# Patient Record
Sex: MALE | Race: WHITE
[De-identification: names, ages, dates, MRNs, and addresses within clinical notes are randomized per-mention and may not be internally consistent; named-entity substitution may affect disease eponyms.]

---

## 2019-10-09 ENCOUNTER — HOSPITAL ENCOUNTER (INPATIENT)
Dept: HOSPITAL 72 - EMR | Age: 84
LOS: 9 days | Discharge: NURSING, REHAB OR LONG-TERM CARE | DRG: 871 | End: 2019-10-18
Payer: MEDICARE

## 2019-10-09 VITALS — DIASTOLIC BLOOD PRESSURE: 54 MMHG | SYSTOLIC BLOOD PRESSURE: 98 MMHG

## 2019-10-09 VITALS — SYSTOLIC BLOOD PRESSURE: 91 MMHG | DIASTOLIC BLOOD PRESSURE: 49 MMHG

## 2019-10-09 VITALS — SYSTOLIC BLOOD PRESSURE: 86 MMHG | DIASTOLIC BLOOD PRESSURE: 64 MMHG

## 2019-10-09 VITALS — DIASTOLIC BLOOD PRESSURE: 56 MMHG | SYSTOLIC BLOOD PRESSURE: 99 MMHG

## 2019-10-09 VITALS — SYSTOLIC BLOOD PRESSURE: 99 MMHG | DIASTOLIC BLOOD PRESSURE: 47 MMHG

## 2019-10-09 VITALS — HEIGHT: 62 IN | WEIGHT: 112 LBS | BODY MASS INDEX: 20.61 KG/M2

## 2019-10-09 VITALS — DIASTOLIC BLOOD PRESSURE: 53 MMHG | SYSTOLIC BLOOD PRESSURE: 102 MMHG

## 2019-10-09 DIAGNOSIS — R65.20: ICD-10-CM

## 2019-10-09 DIAGNOSIS — Z95.1: ICD-10-CM

## 2019-10-09 DIAGNOSIS — F33.0: ICD-10-CM

## 2019-10-09 DIAGNOSIS — D52.9: ICD-10-CM

## 2019-10-09 DIAGNOSIS — E78.5: ICD-10-CM

## 2019-10-09 DIAGNOSIS — I11.0: ICD-10-CM

## 2019-10-09 DIAGNOSIS — I48.91: ICD-10-CM

## 2019-10-09 DIAGNOSIS — Z66: ICD-10-CM

## 2019-10-09 DIAGNOSIS — Z79.01: ICD-10-CM

## 2019-10-09 DIAGNOSIS — N39.0: ICD-10-CM

## 2019-10-09 DIAGNOSIS — F20.9: ICD-10-CM

## 2019-10-09 DIAGNOSIS — J18.9: ICD-10-CM

## 2019-10-09 DIAGNOSIS — A41.9: Primary | ICD-10-CM

## 2019-10-09 DIAGNOSIS — I50.22: ICD-10-CM

## 2019-10-09 DIAGNOSIS — I25.5: ICD-10-CM

## 2019-10-09 DIAGNOSIS — I25.10: ICD-10-CM

## 2019-10-09 DIAGNOSIS — N40.0: ICD-10-CM

## 2019-10-09 DIAGNOSIS — R19.7: ICD-10-CM

## 2019-10-09 DIAGNOSIS — Z95.810: ICD-10-CM

## 2019-10-09 DIAGNOSIS — I25.2: ICD-10-CM

## 2019-10-09 DIAGNOSIS — E46: ICD-10-CM

## 2019-10-09 LAB
ADD MANUAL DIFF: YES
ALBUMIN SERPL-MCNC: 2.1 G/DL (ref 3.4–5)
ALBUMIN/GLOB SERPL: 0.5 {RATIO} (ref 1–2.7)
ALP SERPL-CCNC: 98 U/L (ref 46–116)
ALT SERPL-CCNC: 59 U/L (ref 12–78)
ANION GAP SERPL CALC-SCNC: 7 MMOL/L (ref 5–15)
APPEARANCE UR: (no result)
APTT PPP: (no result) S
AST SERPL-CCNC: 25 U/L (ref 15–37)
BILIRUB SERPL-MCNC: 0.8 MG/DL (ref 0.2–1)
BUN SERPL-MCNC: 33 MG/DL (ref 7–18)
CALCIUM SERPL-MCNC: 9.1 MG/DL (ref 8.5–10.1)
CHLORIDE SERPL-SCNC: 104 MMOL/L (ref 98–107)
CK MB SERPL-MCNC: 1.1 NG/ML (ref 0–3.6)
CK SERPL-CCNC: 207 U/L (ref 26–308)
CO2 SERPL-SCNC: 29 MMOL/L (ref 21–32)
CREAT SERPL-MCNC: 1.3 MG/DL (ref 0.55–1.3)
ERYTHROCYTE [DISTWIDTH] IN BLOOD BY AUTOMATED COUNT: 12.2 % (ref 11.6–14.8)
GLOBULIN SER-MCNC: 4.2 G/DL
GLUCOSE UR STRIP-MCNC: NEGATIVE MG/DL
HCT VFR BLD CALC: 35.7 % (ref 42–52)
HGB BLD-MCNC: 11.9 G/DL (ref 14.2–18)
KETONES UR QL STRIP: (no result)
LEUKOCYTE ESTERASE UR QL STRIP: (no result)
MCV RBC AUTO: 99 FL (ref 80–99)
NITRITE UR QL STRIP: NEGATIVE
PH UR STRIP: 5 [PH] (ref 4.5–8)
PLATELET # BLD: 245 K/UL (ref 150–450)
POTASSIUM SERPL-SCNC: 3.8 MMOL/L (ref 3.5–5.1)
PROT UR QL STRIP: (no result)
RBC # BLD AUTO: 3.61 M/UL (ref 4.7–6.1)
SODIUM SERPL-SCNC: 140 MMOL/L (ref 136–145)
SP GR UR STRIP: 1.02 (ref 1–1.03)
UROBILINOGEN UR-MCNC: 1 MG/DL (ref 0–1)
WBC # BLD AUTO: 27.7 K/UL (ref 4.8–10.8)

## 2019-10-09 PROCEDURE — 80069 RENAL FUNCTION PANEL: CPT

## 2019-10-09 PROCEDURE — 94664 DEMO&/EVAL PT USE INHALER: CPT

## 2019-10-09 PROCEDURE — 82550 ASSAY OF CK (CPK): CPT

## 2019-10-09 PROCEDURE — 81003 URINALYSIS AUTO W/O SCOPE: CPT

## 2019-10-09 PROCEDURE — 71045 X-RAY EXAM CHEST 1 VIEW: CPT

## 2019-10-09 PROCEDURE — 87081 CULTURE SCREEN ONLY: CPT

## 2019-10-09 PROCEDURE — 82607 VITAMIN B-12: CPT

## 2019-10-09 PROCEDURE — 93306 TTE W/DOPPLER COMPLETE: CPT

## 2019-10-09 PROCEDURE — 93970 EXTREMITY STUDY: CPT

## 2019-10-09 PROCEDURE — 87205 SMEAR GRAM STAIN: CPT

## 2019-10-09 PROCEDURE — 82378 CARCINOEMBRYONIC ANTIGEN: CPT

## 2019-10-09 PROCEDURE — 82553 CREATINE MB FRACTION: CPT

## 2019-10-09 PROCEDURE — 80162 ASSAY OF DIGOXIN TOTAL: CPT

## 2019-10-09 PROCEDURE — 85007 BL SMEAR W/DIFF WBC COUNT: CPT

## 2019-10-09 PROCEDURE — 87070 CULTURE OTHR SPECIMN AEROBIC: CPT

## 2019-10-09 PROCEDURE — 80202 ASSAY OF VANCOMYCIN: CPT

## 2019-10-09 PROCEDURE — 71250 CT THORAX DX C-: CPT

## 2019-10-09 PROCEDURE — 82270 OCCULT BLOOD FECES: CPT

## 2019-10-09 PROCEDURE — 80053 COMPREHEN METABOLIC PANEL: CPT

## 2019-10-09 PROCEDURE — 82962 GLUCOSE BLOOD TEST: CPT

## 2019-10-09 PROCEDURE — 86710 INFLUENZA VIRUS ANTIBODY: CPT

## 2019-10-09 PROCEDURE — 81001 URINALYSIS AUTO W/SCOPE: CPT

## 2019-10-09 PROCEDURE — 36415 COLL VENOUS BLD VENIPUNCTURE: CPT

## 2019-10-09 PROCEDURE — 83690 ASSAY OF LIPASE: CPT

## 2019-10-09 PROCEDURE — 85025 COMPLETE CBC W/AUTO DIFF WBC: CPT

## 2019-10-09 PROCEDURE — 93005 ELECTROCARDIOGRAM TRACING: CPT

## 2019-10-09 PROCEDURE — 84484 ASSAY OF TROPONIN QUANT: CPT

## 2019-10-09 PROCEDURE — 87040 BLOOD CULTURE FOR BACTERIA: CPT

## 2019-10-09 PROCEDURE — 80048 BASIC METABOLIC PNL TOTAL CA: CPT

## 2019-10-09 PROCEDURE — 82164 ANGIOTENSIN I ENZYME TEST: CPT

## 2019-10-09 PROCEDURE — 87086 URINE CULTURE/COLONY COUNT: CPT

## 2019-10-09 PROCEDURE — 83605 ASSAY OF LACTIC ACID: CPT

## 2019-10-09 PROCEDURE — 83550 IRON BINDING TEST: CPT

## 2019-10-09 PROCEDURE — 99291 CRITICAL CARE FIRST HOUR: CPT

## 2019-10-09 PROCEDURE — 96365 THER/PROPH/DIAG IV INF INIT: CPT

## 2019-10-09 PROCEDURE — 83540 ASSAY OF IRON: CPT

## 2019-10-09 PROCEDURE — 87045 FECES CULTURE AEROBIC BACT: CPT

## 2019-10-09 PROCEDURE — 82746 ASSAY OF FOLIC ACID SERUM: CPT

## 2019-10-09 PROCEDURE — 96361 HYDRATE IV INFUSION ADD-ON: CPT

## 2019-10-09 PROCEDURE — 83880 ASSAY OF NATRIURETIC PEPTIDE: CPT

## 2019-10-09 PROCEDURE — 96368 THER/DIAG CONCURRENT INF: CPT

## 2019-10-09 PROCEDURE — 87324 CLOSTRIDIUM AG IA: CPT

## 2019-10-09 PROCEDURE — 94640 AIRWAY INHALATION TREATMENT: CPT

## 2019-10-09 RX ADMIN — TAMSULOSIN HYDROCHLORIDE SCH MG: 0.4 CAPSULE ORAL at 20:29

## 2019-10-09 RX ADMIN — TRAZODONE HYDROCHLORIDE SCH MG: 50 TABLET ORAL at 20:29

## 2019-10-09 NOTE — CONSULTATION
DATE OF CONSULTATION:  10/09/2019

CARDIOLOGY CONSULTATION



This is a coverage for Dr. Ventura.



REQUESTING PHYSICIAN:  Lucy Carroll M.D.



REASON FOR CONSULT:  Heart failure and implantable defibrillator.



REASON FOR CONSULT:  Congestive heart failure.



HISTORY OF PRESENT ILLNESS:  Limited history is available from the patient.

The patient is an 83-year-old  man, skilled nursing

facility resident, who was brought in by paramedics with increasing cough

and dyspnea.  His white blood count was 27,000 on admission and chest

x-ray showed a left upper lobe infiltrate consistent with pneumonia.  He

has been admitted for further treatment.  With regard to his cardiac

status, he has a history of coronary artery disease with remote history of

coronary artery bypass graft surgery (details not currently available),

ischemic cardiomyopathy, and status post biventricular ICD placement with

several generator replacements.  He states that the current generator is a

[employer] device.  He had an echo today, which showed an ejection fraction

of 25 to 30 percent, global left ventricular hypokinesis, and apical

akinesis.



MEDICATIONS (OUTPATIENT):  Atorvastatin 40 mg at bedtime, Coreg 25 mg

b.i.d., digoxin 0.125 mg daily, Aricept 5 mg daily, Avodart 0.5 mg daily,

Lexapro 10 mg daily, Lasix 20 mg daily, lisinopril 20 mg daily, Xarelto 10

mg daily daily, Daliresp 500 mcg p.o. daily, Flomax 0.4 mg at bedtime,

Spiriva inhaler 18 mcg 1 puff daily, and trazodone 150 mg at bedtime.



ALLERGIES:  No known drug allergies.



PAST MEDICAL HISTORY:  As noted above.  Also, history of BPH,

hyperlipidemia, permanent atrial fibrillation, chronic obstructive

pulmonary disease, and schizophrenia.



SOCIAL HISTORY:  The patient reports smoking 3 to 4 cigarettes per day.

Remote history of alcohol use, none currently.  No history of drug use.



PHYSICAL EXAMINATION:

VITAL SIGNS:  Blood pressure 91/49, previously 74/42, improved following

intravenous fluids, pulse 60 regular (paced), respirations 18, and

afebrile.

GENERAL:  A thin elderly appearing  male, in no acute

distress.

HEENT:  Normocephalic and atraumatic.  Bitemporal wasting.  Pupils are

equal, round, and reactive to light.  Sclerae anicteric.  Oral mucosa are

moist.

NECK:  Supple.  There is no jugular venous distention.

LUNGS:  Left rales.

HEART:  Regular S1, S2.  No murmur or S3.

ABDOMEN:  Soft and nontender.  No palpable mass.

EXTREMITIES:  No cyanosis, clubbing, or edema.



LABORATORY DATA:  White blood count 27,700, hemoglobin 11.9, and platelets

245,000.  Sodium 140, potassium 3.8, chloride 104, bicarb 29, BUN 33,

creatinine 1.3, and glucose 131.  Troponin 0.067.  BNP 8769.  EKG is

pending.  Telemetry shows atrial fibrillation with a ventricularly paced

rhythm at 60 beats per minute.  Chest x-ray shows cardiomegaly,

biventricular ICD with leads in the right atrium, right ventricle, and

coronary sinus branch, and a large left upper lung infiltrate.



PLAN AND ASSESSMENT:  The patient is an 83-year-old man with multiple

chronic medical issues as outlined above, who was admitted with a left

upper lobe pneumonia.  He is being evaluated by Infectious Disease and has

been started on antibiotics including vancomycin and cefepime and

azithromycin.  He has also been started on respiratory treatments

including albuterol ipratropium.  With regard to his cardiac status, he

does not currently appear to be in congestive heart failure.  He is in

atrial fibrillation with controlled ventricular rate, currently

ventricular pacing at 60 beats per minute.  I would favor continuing

Xarelto for stroke prevention.  His blood pressure at this point is too

low to resume his Coreg or ACE inhibitor.  I will plan to restart these

medications once blood pressure has improved with resolution of his

pneumonia and sepsis.  We will arrange for ICD interrogation and would

consider increasing the pacing rate in view of his significantly decreased

left ventricular systolic function.  Dr. Ventura will continue to follow

the patient starting 10/10/2019.



Thank you for involving us in his care.









  ______________________________________________

  Ellen Haynes M.D. DR:  SHERYL

D:  10/09/2019 22:11

T:  10/09/2019 22:48

JOB#:  [id number]

CC:

## 2019-10-09 NOTE — NUR
NURSE NOTES:

Received report from MANUEL Gordon RN. Patient in bed with no complaints of acute pain or 
discomfort. Kept clean, dry, and comfortable in bed. On bedrest for weakness and safety. IV 
line intact and patent SL. External catheter in place. On 2L NC with no S/S respiratory 
distress or SOB noted. Safety precaution in place; siderails X3 up, call light within reach, 
bed in lowest position, brakes and alarm on at all times. Needs and wants anticipated and 
attended. Will continue plan of care and monitor for any changes noted.

## 2019-10-09 NOTE — NUR
ED Nurse Note:



patient transferred to 2E with all of his belongings on ACLS protocol without 
complication.

## 2019-10-09 NOTE — CONSULTATION
History of Present Illness


General


Date patient seen:  Oct 9, 2019


Chief Complaint:  Abnormal Labs





Present Illness


HPI


84 y/o M with hx of Afib on AC, HTN, HLD, CAD/MI s/p CABG, s/p AICD, tobacco 

abuse, cardiomyopathy EF 25-30%, CHF, anemia, BPH, schizophrenia, [facility] resident 

presented to ED on 10/9 with congestion, productive cough and elevated WBC. 

Hypotensive in ED, responded to fluids.


Allergies:  


Coded Allergies:  


     No Known Allergies (Unverified , 10/9/19)





Medication History


Scheduled


Atorvastatin Calcium* (Atorvastatin Calcium*), 40 MG ORAL BEDTIME, (Reported)


Carvedilol* (Carvedilol*), 25 MG ORAL EVERY 12 HOURS, (Reported)


Digoxin* (Digoxin*), 0.125 MG ORAL DAILY, (Reported)


Donepezil Hcl* (Donepezil Hcl*), 5 MG ORAL QHS, (Reported)


Dutasteride (Avodart), 0.5 MG ORAL DAILY, (Reported)


Escitalopram Oxalate* (Lexapro*), 10 MG ORAL DAILY, (Reported)


Furosemide* (Lasix*), 20 MG ORAL DAILY, (Reported)


Lisinopril (Lisinopril*), 20 MG ORAL DAILY, (Reported)


Rivaroxaban (Xarelto*), 20 MG ORAL DAILY, (Reported)


Roflumilast (Daliresp), 500 MCG PO DAILY, (Reported)


Tamsulosin HCl (Flomax), 0.4 MG ORAL QHS, (Reported)


Tiotropium Bromide* (Spiriva*), 1 PUFF INH DAILY, (Reported)


Trazodone* (Trazodone*), 25 MG ORAL BEDTIME, (Reported)





Patient History


Healthcare decision maker


FELTON ROY


Resuscitation status





Advanced Directive on File





Patient History Narrative


Pmhx: as above





Shx:  The patient reports smoking 3 to 4 cigarettes per day.


Remote history of alcohol use, none currently.  No history of drug use.





Fhx: non contributory





Physical Exam





Last 24 Hour Vital Signs








  Date Time  Temp Pulse Resp B/P (MAP) Pulse Ox O2 Delivery O2 Flow Rate FiO2


 


10/9/19 13:15 98.0 76 21 99/47 97 Nasal Cannula 2.0 


 


10/9/19 13:15 98.0 76 21 99/47 97 Nasal Cannula 2.0 


 


10/9/19 13:07 98.0 46 15 91/49 93 Nasal Cannula 2.0 


 


10/9/19 11:19 98.0 78 21 86/64 94 Nasal Cannula 2.0 


 


10/9/19 10:27 95.5 89 16 74/42 (53) 91 Nasal Cannula 2.0 











Laboratory Tests








Test


  10/9/19


10:50 10/9/19


11:00 10/9/19


11:53


 


White Blood Count


  27.7 K/UL


(4.8-10.8)  *H 


  


 


 


Red Blood Count


  3.61 M/UL


(4.70-6.10)  L 


  


 


 


Hemoglobin


  11.9 G/DL


(14.2-18.0)  L 


  


 


 


Hematocrit


  35.7 %


(42.0-52.0)  L 


  


 


 


Mean Corpuscular Volume 99 FL (80-99)    


 


Mean Corpuscular Hemoglobin


  32.9 PG


(27.0-31.0)  H 


  


 


 


Mean Corpuscular Hemoglobin


Concent 33.2 G/DL


(32.0-36.0) 


  


 


 


Red Cell Distribution Width


  12.2 %


(11.6-14.8) 


  


 


 


Platelet Count


  245 K/UL


(150-450) 


  


 


 


Mean Platelet Volume


  6.7 FL


(6.5-10.1) 


  


 


 


Neutrophils (%) (Auto)


  % (45.0-75.0)


  


  


 


 


Lymphocytes (%) (Auto)


  % (20.0-45.0)


  


  


 


 


Monocytes (%) (Auto)  % (1.0-10.0)    


 


Eosinophils (%) (Auto)  % (0.0-3.0)    


 


Basophils (%) (Auto)  % (0.0-2.0)    


 


Differential Total Cells


Counted 100  


  


  


 


 


Neutrophils % (Manual) 84 % (45-75)  H  


 


Lymphocytes % (Manual) 2 % (20-45)  L  


 


Monocytes % (Manual) 3 % (1-10)    


 


Eosinophils % (Manual) 0 % (0-3)    


 


Basophils % (Manual) 0 % (0-2)    


 


Band Neutrophils 11 % (0-8)  H  


 


Platelet Estimate Adequate    


 


Platelet Morphology Normal    


 


Sodium Level


  140 MMOL/L


(136-145) 


  


 


 


Potassium Level


  3.8 MMOL/L


(3.5-5.1) 


  


 


 


Chloride Level


  104 MMOL/L


() 


  


 


 


Carbon Dioxide Level


  29 MMOL/L


(21-32) 


  


 


 


Anion Gap


  7 mmol/L


(5-15) 


  


 


 


Blood Urea Nitrogen


  33 mg/dL


(7-18)  H 


  


 


 


Creatinine


  1.3 MG/DL


(0.55-1.30) 


  


 


 


Estimat Glomerular Filtration


Rate  mL/min (>60)  


  


  


 


 


Glucose Level


  131 MG/DL


()  H 


  


 


 


Lactic Acid Level


  2.20 mmol/L


(0.4-2.0)  H 


  2.10 mmol/L


(0.66-2.22)


 


Calcium Level


  9.1 MG/DL


(8.5-10.1) 


  


 


 


Total Bilirubin


  0.8 MG/DL


(0.2-1.0) 


  


 


 


Aspartate Amino Transf


(AST/SGOT) 25 U/L (15-37)


  


  


 


 


Alanine Aminotransferase


(ALT/SGPT) 59 U/L (12-78)


  


  


 


 


Alkaline Phosphatase


  98 U/L


() 


  


 


 


Total Creatine Kinase


  207 U/L


() 


  


 


 


Creatine Kinase MB


  1.1 NG/ML


(0.0-3.6) 


  


 


 


Creatine Kinase MB Relative


Index 0.5  


  


  


 


 


Troponin I


  0.067 ng/mL


(0.000-0.056) 


  


 


 


Pro-B-Type Natriuretic Peptide


  8769 pg/mL


(0-125)  H 


  


 


 


Total Protein


  6.3 G/DL


(6.4-8.2)  L 


  


 


 


Albumin


  2.1 G/DL


(3.4-5.0)  L 


  


 


 


Globulin 4.2 g/dL    


 


Albumin/Globulin Ratio


  0.5 (1.0-2.7)


L 


  


 


 


Urine Color  Brown   


 


Urine Appearance


  


  Slightly


cloudy 


 


 


Urine pH  5 (4.5-8.0)   


 


Urine Specific Gravity


  


  1.020


(1.005-1.035) 


 


 


Urine Protein


  


  2+ (NEGATIVE)


H 


 


 


Urine Glucose (UA)


  


  Negative


(NEGATIVE) 


 


 


Urine Ketones


  


  1+ (NEGATIVE)


H 


 


 


Urine Blood


  


  5+ (NEGATIVE)


H 


 


 


Urine Nitrite


  


  Negative


(NEGATIVE) 


 


 


Urine Bilirubin


  


  1+ (NEGATIVE)


H 


 


 


Urine Ictotest


  


  Negative


(NEGATIVE) 


 


 


Urine Urobilinogen


  


  1 MG/DL


(0.0-1.0)  H 


 


 


Urine Leukocyte Esterase


  


  1+ (NEGATIVE)


H 


 


 


Urine RBC


  


  Tntc /HPF (0 -


0)  H 


 


 


Urine WBC


  


  15-20 /HPF (0


- 0)  H 


 


 


Urine Squamous Epithelial


Cells 


  Occasional


/LPF 


 


 


Urine Bacteria


  


  Few /HPF


(NONE) 


 











Microbiology








 Date/Time


Source Procedure


Growth Status


 


 


 10/9/19 11:00


Rectum  Received








Height (Feet):  5


Height (Inches):  3.00


Weight (Pounds):  114


Medications





Current Medications








 Medications


  (Trade)  Dose


 Ordered  Sig/Colleen


 Route


 PRN Reason  Start Time


 Stop Time Status Last Admin


Dose Admin


 


 Acetaminophen


  (Tylenol)  650 mg  Q4H  PRN


 ORAL


 FEVER  10/9/19 12:45


 11/8/19 12:44   


 


 


 Albuterol/


 Ipratropium


  (Albuterol/


 Ipratropium)  3 ml  Q4H  PRN


 HHN


 Shortness of Breath  10/9/19 12:45


 10/14/19 12:44   


 


 


 Cefepime HCl 2 gm/


 Dextrose  110 ml @ 


 220 mls/hr  DAILY


 IV


   10/10/19 09:00


 10/17/19 08:59   


 


 


 Cefepime HCl 2 gm/


 Dextrose  110 ml @ 


 220 mls/hr  ONCE  ONCE


 IV


   10/9/19 21:00


 10/9/19 21:29   


 


 


 Dextrose


  (Dextrose 50%)  25 ml  Q30M  PRN


 IV


 Hypoglycemia  10/9/19 12:45


 11/8/19 12:44   


 


 


 Dextrose


  (Dextrose 50%)  50 ml  Q30M  PRN


 IV


 Hypoglycemia  10/9/19 12:45


 11/8/19 12:44   


 


 


 Digoxin


  (Lanoxin)  0.125 mg  DAILY


 ORAL


   10/10/19 09:00


 11/9/19 08:59   


 


 


 Lorazepam


  (Ativan 2mg/ml


 1ml)  2 mg  Q2H  PRN


 IV


 For Anxiety  10/9/19 12:45


 10/16/19 12:44   


 


 


 Polyethylene


 Glycol


  (Miralax)  17 gm  DAILYPRN  PRN


 ORAL


 Constipation  10/9/19 12:45


 11/8/19 12:44   


 


 


 Promethazine HCl/


 Codeine


  (Phenergan with


 Codeine)  5 ml  Q4H  PRN


 ORAL


 For Cough  10/9/19 12:45


 11/8/19 12:44   


 


 


 Rivaroxaban


  (Xarelto)  15 mg  DAILY


 ORAL


   10/10/19 09:00


 11/9/19 08:59   


 


 


 Sodium Chloride  1,000 ml @ 


 75 mls/hr  [id number]


 IV


   10/9/19 12:32


 11/8/19 12:31   


 


 


 Tamsulosin HCl


  (Flomax)  0.4 mg  QHS


 ORAL


   10/9/19 21:00


 11/8/19 20:59   


 


 


 Trazodone HCl


  (Desyrel)  25 mg  BEDTIME


 ORAL


   10/9/19 21:00


 11/8/19 20:59   


 


 


 Vancomycin HCl


  (Vanco rx to


 dose)  1 ea  DAILY  PRN


 MISC


 Per rx protocol  10/9/19 13:45


 11/8/19 13:44   


 


 


 Vancomycin HCl


 500 mg/Dextrose  110 ml @ 


 110 mls/hr  Q24H


 IVPB


   10/10/19 15:00


 10/15/19 14:59   


 


 


 Vancomycin HCl 1


 gm/Dextrose  275 ml @ 


 183.3 mls/


 hr  ONCE  ONCE


 IVPB


   10/9/19 15:00


 10/9/19 16:30   


 











Assessment/Plan


Assessment/Plan:


Abx:


IV Vancomycin 10/9-


Cefepime 10/9-


Zosyn x1 10/9


Azithromycin x1 10/9





Assessment:


SEvere sepsis 2ry to PNA


Transient hypotension


    -CXR:  Left upper lobe consolidation most likely due to pneumonia


 


 Afebrile


Leukocytosis





Lactic acidosis, mild- resolved





Afib on AC


HTN


HLD


CHF


anemia


BPH 


schizophrenia


CAD/MI s/p CABG


 s/p AICD


 tobacco abuse


 cardiomyopathy EF 25-30%


[facility] resident


 


Plan:


-Continue empiric IV Vancomycin, Cefepime and azithromycin #1 for PNA pending 

cultures


-f/u cx


-Monitor CBC/CMP, temperatures


-sp cx, legionella ag urine, Influenza PCR


-aspiration precautions





Thank you for this consultation. Will continue to follow along with you.





Discussed with Opal Matias M.D. Oct 9, 2019 14:10

## 2019-10-09 NOTE — DIAGNOSTIC IMAGING REPORT
Indication: Dyspnea

 

Comparison:  None

 

A single view chest radiograph was obtained.

 

Findings:

 

There is extensive airspace consolidation in the left upper lobe consistent with

pneumonia. The heart is enlarged. Sternotomy and left-sided pacemaker noted. Bones

are slightly osteopenic.

 

IMPRESSION:

 

Left upper lobe consolidation most likely due to pneumonia

## 2019-10-09 NOTE — EMERGENCY ROOM REPORT
History of Present Illness


General


Chief Complaint:  Abnormal Labs


Source:  Patient, EMS





Present Illness


HPI


83-year-old male presents ED for evaluation.  Brought in by EMS from skilled 

nursing facility.  Noted to have increased cough and congestion today.  Recent 

labs show elevated white cell count.  Cough is productive with whitish phlegm.  

Afebrile in triage.  Patient denies chest pain.  No other aggravating relieving 

factors.  Denies any other associated symptoms


Allergies:  


Coded Allergies:  


     No Known Allergies (Unverified , 10/9/19)





Patient History


Past Medical History:  HTN, MI, psych hx


Past Surgical History:  none


Pertinent Family History:  none


Social History:  Denies: smoking, alcohol use, drug use


Immunizations:  UTD


Reviewed Nursing Documentation:  PMH: Agreed; PSxH: Agreed





Nursing Documentation-PMH


Hx Cardiac Problems:  Yes - HEART FAILURE, AFIB, HYPERLIPIDEMIA, BPH, OLD MI,


Hx Hypertension:  Yes


Hx COPD:  Yes


History Of Psychiatric Problem:  Yes - SCHIZOPHRENIA





Review of Systems


All Other Systems:  negative except mentioned in HPI





Physical Exam





Vital Signs








  Date Time  Temp Pulse Resp B/P (MAP) Pulse Ox O2 Delivery O2 Flow Rate FiO2


 


10/9/19 10:27 95.5 89 16 74/42 (53) 91 Nasal Cannula 2.0 








Sp02 EP Interpretation:  reviewed, normal


General Appearance:  alert, GCS 15, non-toxic, mild distress, cachetic, thin


Head:  normocephalic, atraumatic


Eyes:  bilateral eye normal inspection, bilateral eye PERRL


ENT:  hearing grossly normal, normal pharynx, no angioedema, normal voice


Neck:  full range of motion, supple/symm/no masses


Respiratory:  chest non-tender, crackles, rales, speaking full sentences


Cardiovascular #1:  regular rate, rhythm, no edema


Cardiovascular #2:  2+ carotid (R), 2+ carotid (L), 2+ radial (R), 2+ radial (L)

, 2+ dorsalis pedis (R), 2+ dorsalis pedis (L)


Gastrointestinal:  normal bowel sounds, non tender, soft, non-distended, no 

guarding, no rebound


Rectal:  deferred


Genitourinary:  normal inspection, no CVA tenderness


Musculoskeletal:  back normal, gait/station normal, normal range of motion, non-

tender


Neurologic:  alert, oriented x3, responsive, motor strength/tone normal, 

sensory intact, speech normal


Psychiatric:  judgement/insight normal, memory normal, mood/affect normal, no 

suicidal/homicidal ideation


Reflexes:  3+ bicep (R), 3+ bicep (L), 3+ tricep (R), 3+ tricep (L), 3+ knee (R)

, 3+ knee (L)


Skin:  other - see nursing notes


Lymphatic:  no adenopathy





Procedures


Critical Care Time


Critical Care Time


i.  I feel this is a highly complex case requiring extensive working including 

EKG/Rhythm strip, Xray/CT/US, Blood/urine lab work, repeat exams while in ED, 

and administration of strong opiates/narcotics for pain control, admission to 

hospital or close patient follow up.  





Total time: 50 min bedside evaluation and treatment excludes procedures (EKG). 


Reason for critical care: hypotension. pneumonia


Possible complications: hypotension, hypertension, MI, shock, arrhythmias, 

metabolic acidosis, end organ damage, respiratory failure. 


Interventions: labs, EKG, CXR, IVFS, broad spectrum abx


Course: Patient presenting with cough and congestion.  Hypotension.  

Significant leukocytosis.  Lactic elevated.  Troponin determinate.  BNP 

elevated.  Chest x-ray shows significant left upper lobe infiltrate.  BP 

improved after 30 cc/kg fluid bolus.  Broad-spectrum antibiotics given.


Consultations: nursing staff, EMS, family 


Performed by: Dr Olmedo


Tolerated well condition = serious





j.  because of unstable vital signs this patient had a condition that could 

potentially threaten life or limb.  I feel this is a critical patient who 

required my full attention while patient was considered critical.  Total 

Critical Care Time excluding procedures was greater than 50 minutes





Medical Decision Making


Diagnostic Impression:  


 Primary Impression:  


 Pneumonia


 Qualified Codes:  J18.1 - Lobar pneumonia, unspecified organism


 Additional Impressions:  


 Sepsis


 Qualified Codes:  A41.9 - Sepsis, unspecified organism


 UTI (urinary tract infection)


 Qualified Codes:  N39.0 - Urinary tract infection, site not specified


ER Course


Hospital Course 


84 yo M presents with cough, congestion, hypotension 





Differential diagnoses include: Pneumonia, CHF exacerbation, pneumothorax, 

fluid overload





Clinical course


Patient placed on stretcher.  On cardiac monitor withhypotension .  After 

initial history and physical, I ordered labs, IV fluids, EKG, chest x-ray, 

blood cultures, UA.  





Labs - significant leukocytosis, hb/hct stable, elecrolytes ok, lactic > 2, 

trop indeterminate, BNP elevated, UA + bacteria


CXR - L upper lobe infiltrate, pacemaker


EKG - paced ryhthm no acute ischemic changes interpreted by me 





Initially hypotensive.  Improved with 30 cc/kg fluid bolus.  Patient is DNR/

selective.  Broad-spectrum antibiotics given.





Case discussed with Dr. Morejon and he agreed to the patient to his service for 

further care and support





I feel this is a highly complex case requiring extensive working including EKG/

Rhythm strip, Xray/CT/US, Blood/urine lab work, repeat exams while in ED, and 

administration of strong opiates/narcotics for pain control, admission to 

hospital or close patient follow up.  





Diagnosis - pneumonia, sepsis, UTI





Patient admitted to telemetry in serious condition





Labs








Test


  10/9/19


10:50 10/9/19


11:00 10/9/19


11:53


 


White Blood Count


  27.7 K/UL


(4.8-10.8) 


  


 


 


Red Blood Count


  3.61 M/UL


(4.70-6.10) 


  


 


 


Hemoglobin


  11.9 G/DL


(14.2-18.0) 


  


 


 


Hematocrit


  35.7 %


(42.0-52.0) 


  


 


 


Mean Corpuscular Volume 99 FL (80-99)   


 


Mean Corpuscular Hemoglobin


  32.9 PG


(27.0-31.0) 


  


 


 


Mean Corpuscular Hemoglobin


Concent 33.2 G/DL


(32.0-36.0) 


  


 


 


Red Cell Distribution Width


  12.2 %


(11.6-14.8) 


  


 


 


Platelet Count


  245 K/UL


(150-450) 


  


 


 


Mean Platelet Volume


  6.7 FL


(6.5-10.1) 


  


 


 


Neutrophils (%) (Auto)  % (45.0-75.0)   


 


Lymphocytes (%) (Auto)  % (20.0-45.0)   


 


Monocytes (%) (Auto)  % (1.0-10.0)   


 


Eosinophils (%) (Auto)  % (0.0-3.0)   


 


Basophils (%) (Auto)  % (0.0-2.0)   


 


Differential Total Cells


Counted 100 


  


  


 


 


Neutrophils % (Manual) 84 % (45-75)   


 


Lymphocytes % (Manual) 2 % (20-45)   


 


Monocytes % (Manual) 3 % (1-10)   


 


Eosinophils % (Manual) 0 % (0-3)   


 


Basophils % (Manual) 0 % (0-2)   


 


Band Neutrophils 11 % (0-8)   


 


Platelet Estimate Adequate   


 


Platelet Morphology Normal   


 


Sodium Level


  140 MMOL/L


(136-145) 


  


 


 


Potassium Level


  3.8 MMOL/L


(3.5-5.1) 


  


 


 


Chloride Level


  104 MMOL/L


() 


  


 


 


Carbon Dioxide Level


  29 MMOL/L


(21-32) 


  


 


 


Anion Gap


  7 mmol/L


(5-15) 


  


 


 


Blood Urea Nitrogen


  33 mg/dL


(7-18) 


  


 


 


Creatinine


  1.3 MG/DL


(0.55-1.30) 


  


 


 


Estimat Glomerular Filtration


Rate  mL/min (>60) 


  


  


 


 


Glucose Level


  131 MG/DL


() 


  


 


 


Lactic Acid Level


  2.20 mmol/L


(0.4-2.0) 


  2.10 mmol/L


(0.66-2.22)


 


Calcium Level


  9.1 MG/DL


(8.5-10.1) 


  


 


 


Total Bilirubin


  0.8 MG/DL


(0.2-1.0) 


  


 


 


Aspartate Amino Transf


(AST/SGOT) 25 U/L (15-37) 


  


  


 


 


Alanine Aminotransferase


(ALT/SGPT) 59 U/L (12-78) 


  


  


 


 


Alkaline Phosphatase


  98 U/L


() 


  


 


 


Total Creatine Kinase


  207 U/L


() 


  


 


 


Creatine Kinase MB


  1.1 NG/ML


(0.0-3.6) 


  


 


 


Creatine Kinase MB Relative


Index 0.5 


  


  


 


 


Troponin I


  0.067 ng/mL


(0.000-0.056) 


  


 


 


Pro-B-Type Natriuretic Peptide


  8769 pg/mL


(0-125) 


  


 


 


Total Protein


  6.3 G/DL


(6.4-8.2) 


  


 


 


Albumin


  2.1 G/DL


(3.4-5.0) 


  


 


 


Globulin 4.2 g/dL   


 


Albumin/Globulin Ratio 0.5 (1.0-2.7)   


 


Urine Color  Brown  


 


Urine Appearance


  


  Slightly


cloudy 


 


 


Urine pH  5 (4.5-8.0)  


 


Urine Specific Gravity


  


  1.020


(1.005-1.035) 


 


 


Urine Protein  2+ (NEGATIVE)  


 


Urine Glucose (UA)


  


  Negative


(NEGATIVE) 


 


 


Urine Ketones  1+ (NEGATIVE)  


 


Urine Blood  5+ (NEGATIVE)  


 


Urine Nitrite


  


  Negative


(NEGATIVE) 


 


 


Urine Bilirubin  1+ (NEGATIVE)  


 


Urine Ictotest


  


  Negative


(NEGATIVE) 


 


 


Urine Urobilinogen


  


  1 MG/DL


(0.0-1.0) 


 


 


Urine Leukocyte Esterase  1+ (NEGATIVE)  


 


Urine RBC


  


  Tntc /HPF (0 -


0) 


 


 


Urine WBC


  


  15-20 /HPF (0


- 0) 


 


 


Urine Squamous Epithelial


Cells 


  Occasional


/LPF 


 


 


Urine Bacteria


  


  Few /HPF


(NONE) 


 








EKG Diagnostic Results


Rate:  normal


Rhythm:  other - atrial sensed rhythm


ST Segments:  other - twave inversions in lateral leads


ASA given to the pt in ED:  No





Rhythm Strip Diag. Results


EP Interpretation:  yes


Rhythm:  no PVC's, no ectopy





Chest X-Ray Diagnostic Results


Chest X-Ray Diagnostic Results :  


   Chest X-Ray Ordered:  Yes


   # of Views/Limited/Complete:  1 View


   Indication:  Shortness of Breath


   EP Interpretation:  Yes


   Interpretation:  no pneumothorax, other - ANGELES infiltrate. pacemaker


   Impression:  Other - pneumonia


   Electronically Signed by:  Electronically signed by Dale Olmedo MD





Last Vital Signs








  Date Time  Temp Pulse Resp B/P (MAP) Pulse Ox O2 Delivery O2 Flow Rate FiO2


 


10/9/19 13:15 98.0 76 21 99/47 97 Nasal Cannula 2.0 








Status:  improved


Disposition:  ADMITTED AS INPATIENT


Condition:  Serious


Referrals:  


Timo Morejon DO (PCP)











Dale Olmedo MD Oct 9, 2019 14:52

## 2019-10-09 NOTE — NUR
ED Nurse Note:

pt came in via [facility] unit [id number]  from [facility] for elevated wbc and 
congestiion . pt placed on monitor blood urine and swabs sent to lab. ivf up 
infusing . wbc 27.7 lab called ermd aware. rectal temp 98

## 2019-10-09 NOTE — NUR
NURSE NOTES: Nurse report given by RAHEEL Uribe. Patient's transferred from ER by nuria 
supported by ER RN and tech. Patient's in stable condition, no s/s of distress or SOB, 
denies pain. Bed low and locked, call light within reach, side rails x 2, safety precaution 
is on. Cardiac monitor applied on, skin intact, scar appeared on front chest from past 
surgery, appeared with pace maker. Patient's on 2L NC. IV is patent and asymptomatic. 
Admission orders are ordered and carried out. Will continue to monitor.

## 2019-10-09 NOTE — CONSULTATION
Consult Note


Consult Note


Cardiology for Dr. Ventura





Full consult dictated # [id number]











Ellen Haynes MD Oct 9, 2019 22:10

## 2019-10-10 VITALS — SYSTOLIC BLOOD PRESSURE: 117 MMHG | DIASTOLIC BLOOD PRESSURE: 69 MMHG

## 2019-10-10 VITALS — DIASTOLIC BLOOD PRESSURE: 78 MMHG | SYSTOLIC BLOOD PRESSURE: 122 MMHG

## 2019-10-10 VITALS — SYSTOLIC BLOOD PRESSURE: 123 MMHG | DIASTOLIC BLOOD PRESSURE: 54 MMHG

## 2019-10-10 VITALS — DIASTOLIC BLOOD PRESSURE: 53 MMHG | SYSTOLIC BLOOD PRESSURE: 93 MMHG

## 2019-10-10 VITALS — DIASTOLIC BLOOD PRESSURE: 68 MMHG | SYSTOLIC BLOOD PRESSURE: 104 MMHG

## 2019-10-10 VITALS — DIASTOLIC BLOOD PRESSURE: 54 MMHG | SYSTOLIC BLOOD PRESSURE: 104 MMHG

## 2019-10-10 LAB
ADD MANUAL DIFF: YES
ALBUMIN SERPL-MCNC: 2 G/DL (ref 3.4–5)
ANION GAP SERPL CALC-SCNC: 7 MMOL/L (ref 5–15)
BUN SERPL-MCNC: 32 MG/DL (ref 7–18)
CALCIUM SERPL-MCNC: 8.4 MG/DL (ref 8.5–10.1)
CHLORIDE SERPL-SCNC: 107 MMOL/L (ref 98–107)
CO2 SERPL-SCNC: 28 MMOL/L (ref 21–32)
CREAT SERPL-MCNC: 1.1 MG/DL (ref 0.55–1.3)
ERYTHROCYTE [DISTWIDTH] IN BLOOD BY AUTOMATED COUNT: 12.6 % (ref 11.6–14.8)
HCT VFR BLD CALC: 35.5 % (ref 42–52)
HGB BLD-MCNC: 11.7 G/DL (ref 14.2–18)
MCV RBC AUTO: 101 FL (ref 80–99)
PHOSPHATE SERPL-MCNC: 3.1 MG/DL (ref 2.5–4.9)
PLATELET # BLD: 233 K/UL (ref 150–450)
POTASSIUM SERPL-SCNC: 3.6 MMOL/L (ref 3.5–5.1)
RBC # BLD AUTO: 3.52 M/UL (ref 4.7–6.1)
SODIUM SERPL-SCNC: 142 MMOL/L (ref 136–145)
WBC # BLD AUTO: 24.2 K/UL (ref 4.8–10.8)

## 2019-10-10 RX ADMIN — LISINOPRIL SCH MG: 2.5 TABLET ORAL at 21:15

## 2019-10-10 RX ADMIN — VANCOMYCIN HYDROCHLORIDE SCH MLS/HR: 500 INJECTION, POWDER, LYOPHILIZED, FOR SOLUTION INTRAVENOUS at 15:30

## 2019-10-10 RX ADMIN — CARVEDILOL SCH MG: 6.25 TABLET, FILM COATED ORAL at 21:15

## 2019-10-10 RX ADMIN — ATORVASTATIN CALCIUM SCH MG: 20 TABLET, FILM COATED ORAL at 21:16

## 2019-10-10 RX ADMIN — TRAZODONE HYDROCHLORIDE SCH MG: 50 TABLET ORAL at 21:15

## 2019-10-10 RX ADMIN — RIVAROXABAN SCH MG: 15 TABLET, FILM COATED ORAL at 08:49

## 2019-10-10 RX ADMIN — AZITHROMYCIN DIHYDRATE SCH MG: 250 TABLET, FILM COATED ORAL at 08:49

## 2019-10-10 RX ADMIN — SODIUM CHLORIDE SCH MLS/HR: 0.9 INJECTION INTRAVENOUS at 08:53

## 2019-10-10 RX ADMIN — TAMSULOSIN HYDROCHLORIDE SCH MG: 0.4 CAPSULE ORAL at 21:15

## 2019-10-10 NOTE — NUR
NURSE NOTES: gave report to RAHEEL Molina. Endorsed to night RN that patient diet not to his 
liking and may need further refinement as the current rec diet  for dinner could not be 
tolerated for the chewing.

## 2019-10-10 NOTE — NUR
SPEECH PATHOLOGY:\

83 YEAR OLD PATIENT CLEARED FOR ST INTERVENTION BY RAHEEL NERI.



BEDSIDE SWALLOW EVALUATION COMPLETED POST CHART REVIEW ANDINTERVIEW WITH RN



DYSPHAGIA RISK FACTORS FOR THIS 83 YEAR OLD MALE:  DECREASED MENTATION,

UL PNEUMONIA/CONGESTION, EXCESSIVE MISSING TEETH.



PATIENT TRIALED WITH PURE3E, SOFT CHEWABLE SOLID, THIN AND THIN LIQUIDS 

VIA STRAW.  HE PRESENTED WITH EFFICACY OF OROPHARYNGEAL PHASE OF SWALLOW 

GROSSLY INTACT.  HE HAS DIFFICULTY WITH MASTICATION DUE TO EXCESSIVE 

MISSING TEETH.  INTELLIGIBILITY OF CONNECTED/SPONTANEOUS SPEECH IS MOD/SEV

IMPAIRED.



RECOMMENDATIONS:

1.  SAFE TO RESUME P.O. WITH MECHANICALLY SOFT/CHOPPEDE DIET,

     THIN LIQUIDS. 

2.  MEDICATIONS AS TOLERATED

3.  ST TO FOLLOW UP FOR DIET TOLERANCE/COGNITIVE ASSESSMENT

4.  VIDEO SWALLOW STUDY IF NEEDED

## 2019-10-10 NOTE — NUR
NURSE NOTES: Report received from RAHEEL Falcon. Bed inlowest locked position and call bell 
in reach.  Condom  cath on (cdi) with slight amount of dark urine noted in drainage bag.  
Patient NPO before swallow eval.  No overt sign of discomfort and patient with slightly 
garbled speech and alert to person but disoriented to place, situation, time.  Repositioned 
off sacral wound with pillow support and HOB at 20 degrees with heels elevated.

## 2019-10-10 NOTE — NUR
HAND-OFF: 

Report given to HAYLEE Currie RN. Patient in bed with no S/S of distress. Endorsed plan of 
care.

## 2019-10-10 NOTE — NUR
NURSE NOTES:

Received report from RAHEEL Jefferson. No signs of acute distress noted. Will continue to monitor.

## 2019-10-10 NOTE — CONSULTATION
History of Present Illness


General


Date patient seen:  Oct 10, 2019


Chief Complaint:  Abnormal Labs





Present Illness


HPI


83 year old male  with hx of Afib, HTN, BPH CAD, MI, CABG, AICD, tobacco abuse, 

cardiomyopathy, schizophrenia, nursing home resident presented to ED on with CC 

of congestion, productive cough and elevated WBC. Patient was hypotensive in ED

, but he responded to fluids and admitted to telemetry for further work up.


Allergies:  


Coded Allergies:  


     No Known Allergies (Unverified , 10/9/19)





Medication History


Scheduled


Atorvastatin Calcium* (Atorvastatin Calcium*), 40 MG ORAL BEDTIME, (Reported)


Carvedilol* (Carvedilol*), 25 MG ORAL EVERY 12 HOURS, (Reported)


Digoxin* (Digoxin*), 0.125 MG ORAL DAILY, (Reported)


Donepezil Hcl* (Donepezil Hcl*), 5 MG ORAL QHS, (Reported)


Dutasteride (Avodart), 0.5 MG ORAL DAILY, (Reported)


Escitalopram Oxalate* (Lexapro*), 10 MG ORAL DAILY, (Reported)


Furosemide* (Lasix*), 20 MG ORAL DAILY, (Reported)


Lisinopril (Lisinopril*), 20 MG ORAL DAILY, (Reported)


Rivaroxaban (Xarelto*), 20 MG ORAL DAILY, (Reported)


Roflumilast (Daliresp), 500 MCG PO DAILY, (Reported)


Tamsulosin HCl (Flomax), 0.4 MG ORAL QHS, (Reported)


Tiotropium Bromide* (Spiriva*), 1 PUFF INH DAILY, (Reported)


Trazodone* (Trazodone*), 25 MG ORAL BEDTIME, (Reported)





Patient History


Healthcare decision maker


Eva


Resuscitation status


Do Not Resuscitate


Advanced Directive on File


No





Past Medical/Surgical History


Past Medical/Surgical History:  


(1) Chronic anticoagulation


(2) HTN (hypertension)


(3) BPH (benign prostatic hyperplasia)


(4) CAD (coronary artery disease)


(5) COPD (chronic obstructive pulmonary disease)


(6) Atrial fibrillation


(7) CHF (congestive heart failure)





Review of Systems


All Other Systems:  negative except mentioned in HPI





Physical Exam


General Appearance:  cachetic, thin


Lines, tubes and drains:  peripheral


HEENT:  normocephalic, atraumatic


Neck:  non-tender, normal alignment


Respiratory/Chest:  chest wall non-tender, lungs clear


Breasts:  no masses


Cardiovascular/Chest:  normal peripheral pulses, normal rate


Abdomen:  normal bowel sounds


Extremities:  normal range of motion


Skin Exam:  normal pigmentation


Neurologic:  CNs II-XII grossly normal





Last 24 Hour Vital Signs








  Date Time  Temp Pulse Resp B/P (MAP) Pulse Ox O2 Delivery O2 Flow Rate FiO2


 


10/10/19 11:34  78 18  98 Nasal Cannula 2.0 28





  71 18  97   


 


10/10/19 09:00  69      


 


10/10/19 09:00      Room Air  


 


10/10/19 08:49  63      


 


10/10/19 08:20  65 20  95 Room Air  21


 


10/10/19 08:00 97.2 63 20 93/53 (66) 96   


 


10/10/19 04:00  60      


 


10/10/19 04:00 97.6 78 18 104/68 (80) 96   


 


10/10/19 00:00 97.6 69 18 104/54 (71) 95   


 


10/10/19 00:00  62      


 


10/9/19 21:00      Room Air  


 


10/9/19 20:05  61 18  97 Room Air  21


 


10/9/19 20:00 97.2 66 18 102/53 (69) 95   


 


10/9/19 20:00  56      


 


10/9/19 16:00  64      


 


10/9/19 16:00 97.3 62 18 98/54 (69) 97   


 


10/9/19 14:16      Nasal Cannula 2.0 


 


10/9/19 13:35 98.7 89 18 99/56 (70) 97   


 


10/9/19 13:30  75      


 


10/9/19 13:15 98.0 76 21 99/47 97 Nasal Cannula 2.0 


 


10/9/19 13:15 98.0 76 21 99/47 97 Nasal Cannula 2.0 


 


10/9/19 13:07 98.0 46 15 91/49 93 Nasal Cannula 2.0 

















Intake and Output  


 


 10/9/19 10/10/19





 19:00 07:00


 


Intake Total 1600 ml 


 


Output Total 200 ml 


 


Balance 1400 ml 


 


  


 


Intake IV Total 1600 ml 


 


Output Urine Total 200 ml 


 


# Bowel Movements 1 1











Laboratory Tests








Test


  10/10/19


05:40


 


White Blood Count


  24.2 K/UL


(4.8-10.8)  *H


 


Red Blood Count


  3.52 M/UL


(4.70-6.10)  L


 


Hemoglobin


  11.7 G/DL


(14.2-18.0)  L


 


Hematocrit


  35.5 %


(42.0-52.0)  L


 


Mean Corpuscular Volume


  101 FL (80-99)


H


 


Mean Corpuscular Hemoglobin


  33.1 PG


(27.0-31.0)  H


 


Mean Corpuscular Hemoglobin


Concent 32.9 G/DL


(32.0-36.0)


 


Red Cell Distribution Width


  12.6 %


(11.6-14.8)


 


Platelet Count


  233 K/UL


(150-450)


 


Mean Platelet Volume


  6.1 FL


(6.5-10.1)  L


 


Neutrophils (%) (Auto)


  % (45.0-75.0)


 


 


Lymphocytes (%) (Auto)


  % (20.0-45.0)


 


 


Monocytes (%) (Auto)  % (1.0-10.0)  


 


Eosinophils (%) (Auto)  % (0.0-3.0)  


 


Basophils (%) (Auto)  % (0.0-2.0)  


 


Differential Total Cells


Counted 100  


 


 


Neutrophils % (Manual) 95 % (45-75)  H


 


Lymphocytes % (Manual) 3 % (20-45)  L


 


Monocytes % (Manual) 2 % (1-10)  


 


Eosinophils % (Manual) 0 % (0-3)  


 


Basophils % (Manual) 0 % (0-2)  


 


Band Neutrophils 0 % (0-8)  


 


Platelet Estimate Adequate  


 


Platelet Morphology Normal  


 


Anisocytosis 1+  


 


Macrocytosis 1+  


 


Sodium Level


  142 MMOL/L


(136-145)


 


Potassium Level


  3.6 MMOL/L


(3.5-5.1)


 


Chloride Level


  107 MMOL/L


()


 


Carbon Dioxide Level


  28 MMOL/L


(21-32)


 


Anion Gap


  7 mmol/L


(5-15)


 


Blood Urea Nitrogen


  32 mg/dL


(7-18)  H


 


Creatinine


  1.1 MG/DL


(0.55-1.30)


 


Estimat Glomerular Filtration


Rate  mL/min (>60)  


 


 


Glucose Level


  76 MG/DL


()


 


Calcium Level


  8.4 MG/DL


(8.5-10.1)  L


 


Phosphorus Level


  3.1 MG/DL


(2.5-4.9)


 


Albumin


  2.0 G/DL


(3.4-5.0)  L











Microbiology








 Date/Time


Source Procedure


Growth Status


 


 


 10/9/19 16:50


Nasopharynx - Final Complete


 


 10/9/19 16:50


Nasopharynx - Final Complete








Height (Feet):  5


Height (Inches):  2.00


Weight (Pounds):  110


Medications





Current Medications








 Medications


  (Trade)  Dose


 Ordered  Sig/Colleen


 Route


 PRN Reason  Start Time


 Stop Time Status Last Admin


Dose Admin


 


 Acetaminophen


  (Tylenol)  650 mg  Q4H  PRN


 ORAL


 FEVER  10/9/19 12:45


 11/8/19 12:44   


 


 


 Albuterol/


 Ipratropium


  (Albuterol/


 Ipratropium)  3 ml  Q4H  PRN


 HHN


 Shortness of Breath  10/9/19 12:45


 10/14/19 12:44  10/10/19 11:24


 


 


 Azithromycin


  (Zithromax)  500 mg  DAILY


 ORAL


   10/10/19 09:00


 10/17/19 08:59  10/10/19 08:49


 


 


 Cefepime HCl 2 gm/


 Dextrose  110 ml @ 


 220 mls/hr  DAILY


 IV


   10/10/19 09:00


 10/17/19 08:59  10/10/19 08:53


 


 


 Dextrose


  (Dextrose 50%)  25 ml  Q30M  PRN


 IV


 Hypoglycemia  10/9/19 12:45


 11/8/19 12:44   


 


 


 Dextrose


  (Dextrose 50%)  50 ml  Q30M  PRN


 IV


 Hypoglycemia  10/9/19 12:45


 11/8/19 12:44   


 


 


 Digoxin


  (Lanoxin)  0.125 mg  DAILY


 ORAL


   10/10/19 09:00


 11/9/19 08:59  10/10/19 08:49


 


 


 Lorazepam


  (Ativan 2mg/ml


 1ml)  2 mg  Q2H  PRN


 IV


 For Anxiety  10/9/19 12:45


 10/16/19 12:44   


 


 


 Polyethylene


 Glycol


  (Miralax)  17 gm  DAILYPRN  PRN


 ORAL


 Constipation  10/9/19 12:45


 11/8/19 12:44   


 


 


 Promethazine HCl/


 Codeine


  (Phenergan with


 Codeine)  5 ml  Q4H  PRN


 ORAL


 For Cough  10/9/19 12:45


 11/8/19 12:44   


 


 


 Rivaroxaban


  (Xarelto)  15 mg  DAILY


 ORAL


   10/10/19 09:00


 11/9/19 08:59  10/10/19 08:49


 


 


 Sodium Chloride  1,000 ml @ 


 75 mls/hr  [id number]


 IV


   10/9/19 12:32


 11/8/19 12:31  10/10/19 02:16


 


 


 Tamsulosin HCl


  (Flomax)  0.4 mg  QHS


 ORAL


   10/9/19 21:00


 11/8/19 20:59  10/9/19 20:29


 


 


 Trazodone HCl


  (Desyrel)  25 mg  BEDTIME


 ORAL


   10/9/19 21:00


 11/8/19 20:59  10/9/19 20:29


 


 


 Vancomycin HCl


  (Vanco rx to


 dose)  1 ea  DAILY  PRN


 MISC


 Per rx protocol  10/9/19 13:45


 11/8/19 13:44   


 


 


 Vancomycin HCl


 500 mg/Dextrose  110 ml @ 


 110 mls/hr  Q24H


 IVPB


   10/10/19 15:00


 10/15/19 14:59   


 











Assessment/Plan


Problem List:  


(1) Pneumonia


ICD Codes:  J18.9 - Pneumonia, unspecified organism


SNOMED:  324948609


Qualifiers:  


   Qualified Codes:  J18.1 - Lobar pneumonia, unspecified organism


(2) Chronic anticoagulation


ICD Codes:  Z79.01 - Long term (current) use of anticoagulants


SNOMED:  581179240


(3) HTN (hypertension)


ICD Codes:  I10 - Essential (primary) hypertension


SNOMED:  49738283


(4) BPH (benign prostatic hyperplasia)


ICD Codes:  N40.0 - Benign prostatic hyperplasia without lower urinary tract 

symptoms


SNOMED:  750735701


(5) CAD (coronary artery disease)


ICD Codes:  I25.10 - Atherosclerotic heart disease of native coronary artery 

without angina pectoris


SNOMED:  16774321


(6) COPD (chronic obstructive pulmonary disease)


ICD Codes:  J44.9 - Chronic obstructive pulmonary disease, unspecified


SNOMED:  62737845


(7) Atrial fibrillation


ICD Codes:  I48.91 - Unspecified atrial fibrillation


SNOMED:  60872056


(8) CHF (congestive heart failure)


ICD Codes:  I50.9 - Heart failure, unspecified


SNOMED:  53765232


(9) Sepsis


ICD Codes:  A41.9 - Sepsis, unspecified organism


SNOMED:  12255983


Qualifiers:  


   Qualified Codes:  A41.9 - Sepsis, unspecified organism


Assessment/Plan:


check sputum


iv abx


chest pt


CT of chest 


monitor cardiac parameters


repeat Echo


cardiac evaluation


check blood cultures











Lucy Carroll MD Oct 10, 2019 12:25

## 2019-10-10 NOTE — CARDIOLOGY PROGRESS NOTE
Assessment/Plan


Assessment/Plan


coronary artery disease with remote history ofcabg


ischemic cardiomyopathy, f 25 to 30 percent, global left ventricular hypokinesis

, and apical akinesis


status post biventricular ICD placement withMedtronic device.


Afib on AC


HLD


cheronic systolic CHF


anemia


BPH 


schizophrenia





Objective





Last 24 Hour Vital Signs








  Date Time  Temp Pulse Resp B/P (MAP) Pulse Ox O2 Delivery O2 Flow Rate FiO2


 


10/10/19 16:00  66      


 


10/10/19 16:00 97.3 73 18 123/54 (77) 96   


 


10/10/19 12:00 97.3 68 18 122/78 (93) 95   


 


10/10/19 12:00  72      


 


10/10/19 11:34  78 18  98 Nasal Cannula 2.0 28





  71 18  97   


 


10/10/19 09:00  69      


 


10/10/19 09:00      Room Air  


 


10/10/19 08:49  63      


 


10/10/19 08:20  65 20  95 Room Air  21


 


10/10/19 08:00 97.2 63 20 93/53 (66) 96   


 


10/10/19 04:00  60      


 


10/10/19 04:00 97.6 78 18 104/68 (80) 96   


 


10/10/19 00:00 97.6 69 18 104/54 (71) 95   


 


10/10/19 00:00  62      


 


10/9/19 21:00      Room Air  


 


10/9/19 20:05  61 18  97 Room Air  21


 


10/9/19 20:00 97.2 66 18 102/53 (69) 95   


 


10/9/19 20:00  56      

















Intake and Output  


 


 10/9/19 10/10/19





 19:00 07:00


 


Intake Total 1600 ml 


 


Output Total 200 ml 


 


Balance 1400 ml 


 


  


 


IV Total 1600 ml 


 


Output Urine Total 200 ml 


 


# Bowel Movements 1 1











Laboratory Tests








Test


  10/10/19


05:40


 


White Blood Count


  24.2 K/UL


(4.8-10.8)  *H


 


Red Blood Count


  3.52 M/UL


(4.70-6.10)  L


 


Hemoglobin


  11.7 G/DL


(14.2-18.0)  L


 


Hematocrit


  35.5 %


(42.0-52.0)  L


 


Mean Corpuscular Volume


  101 FL (80-99)


H


 


Mean Corpuscular Hemoglobin


  33.1 PG


(27.0-31.0)  H


 


Mean Corpuscular Hemoglobin


Concent 32.9 G/DL


(32.0-36.0)


 


Red Cell Distribution Width


  12.6 %


(11.6-14.8)


 


Platelet Count


  233 K/UL


(150-450)


 


Mean Platelet Volume


  6.1 FL


(6.5-10.1)  L


 


Neutrophils (%) (Auto)


  % (45.0-75.0)


 


 


Lymphocytes (%) (Auto)


  % (20.0-45.0)


 


 


Monocytes (%) (Auto)  % (1.0-10.0)  


 


Eosinophils (%) (Auto)  % (0.0-3.0)  


 


Basophils (%) (Auto)  % (0.0-2.0)  


 


Differential Total Cells


Counted 100  


 


 


Neutrophils % (Manual) 95 % (45-75)  H


 


Lymphocytes % (Manual) 3 % (20-45)  L


 


Monocytes % (Manual) 2 % (1-10)  


 


Eosinophils % (Manual) 0 % (0-3)  


 


Basophils % (Manual) 0 % (0-2)  


 


Band Neutrophils 0 % (0-8)  


 


Platelet Estimate Adequate  


 


Platelet Morphology Normal  


 


Anisocytosis 1+  


 


Macrocytosis 1+  


 


Sodium Level


  142 MMOL/L


(136-145)


 


Potassium Level


  3.6 MMOL/L


(3.5-5.1)


 


Chloride Level


  107 MMOL/L


()


 


Carbon Dioxide Level


  28 MMOL/L


(21-32)


 


Anion Gap


  7 mmol/L


(5-15)


 


Blood Urea Nitrogen


  32 mg/dL


(7-18)  H


 


Creatinine


  1.1 MG/DL


(0.55-1.30)


 


Estimat Glomerular Filtration


Rate  mL/min (>60)  


 


 


Glucose Level


  76 MG/DL


()


 


Calcium Level


  8.4 MG/DL


(8.5-10.1)  L


 


Phosphorus Level


  3.1 MG/DL


(2.5-4.9)


 


Albumin


  2.0 G/DL


(3.4-5.0)  L











Microbiology








 Date/Time


Source Procedure


Growth Status


 


 


 10/9/19 16:50


Nasopharynx - Final Complete


 


 10/9/19 16:50


Nasopharynx - Final Complete


 


 10/9/19 11:00


Urine,Clean Catch Urine Culture - Preliminary


NO GROWTH Resulted


 


 10/9/19 11:00


Rectum  Received

















Robert Ventura MD Oct 10, 2019 19:28

## 2019-10-10 NOTE — INFECTIOUS DISEASES PROG NOTE
Assessment/Plan


Assessment/Plan


Abx:


IV Vancomycin 10/9-


Cefepime 10/9-


Zosyn x1 10/9


Azithromycin x1 10/9





Assessment:


SEvere sepsis 2ry to PNA


Transient hypotension


    -CXR:  Left upper lobe consolidation most likely due to pneumonia


    -influenza sc neg


    -sp cx p





 Afebrile


Leukocytosis; improving


   -u/a wbc 15-20, nit neg, leuk +1; ucx NTD





Lactic acidosis, mild- resolved





Afib on AC


HTN


HLD


CHF


anemia


BPH 


schizophrenia


CAD/MI s/p CABG


 s/p AICD


 tobacco abuse


 cardiomyopathy EF 25-30%


[facility] resident


 


Plan:


-Continue empiric IV Vancomycin, Cefepime and azithromycin #2 for PNA pending 

cultures


-f/u cx


-Monitor CBC/CMP, temperatures


-f/u sp cx, legionella ag urine


-aspiration precautions





Thank you for this consultation. Will continue to follow along with you.





Discussed with RN





Subjective


Allergies:  


Coded Allergies:  


     No Known Allergies (Unverified , 10/9/19)


Subjective


afebrile


wbc improving





Objective


Vital Signs





Last 24 Hour Vital Signs








  Date Time  Temp Pulse Resp B/P (MAP) Pulse Ox O2 Delivery O2 Flow Rate FiO2


 


10/10/19 11:34  78 18  98 Nasal Cannula 2.0 28





  71 18  97   


 


10/10/19 09:00  69      


 


10/10/19 09:00      Room Air  


 


10/10/19 08:49  63      


 


10/10/19 08:20  65 20  95 Room Air  21


 


10/10/19 08:00 97.2 63 20 93/53 (66) 96   


 


10/10/19 04:00  60      


 


10/10/19 04:00 97.6 78 18 104/68 (80) 96   


 


10/10/19 00:00 97.6 69 18 104/54 (71) 95   


 


10/10/19 00:00  62      


 


10/9/19 21:00      Room Air  


 


10/9/19 20:05  61 18  97 Room Air  21


 


10/9/19 20:00 97.2 66 18 102/53 (69) 95   


 


10/9/19 20:00  56      


 


10/9/19 16:00  64      


 


10/9/19 16:00 97.3 62 18 98/54 (69) 97   


 


10/9/19 14:16      Nasal Cannula 2.0 


 


10/9/19 13:35 98.7 89 18 99/56 (70) 97   


 


10/9/19 13:30  75      


 


10/9/19 13:15 98.0 76 21 99/47 97 Nasal Cannula 2.0 


 


10/9/19 13:15 98.0 76 21 99/47 97 Nasal Cannula 2.0 


 


10/9/19 13:07 98.0 46 15 91/49 93 Nasal Cannula 2.0 








Height (Feet):  5


Height (Inches):  2.00


Weight (Pounds):  110


Objective


GENERAL:  A thin elderly appearing  male, in no acute


distress.


HEENT:  Normocephalic and atraumatic.  Bitemporal wasting.  Pupils are


equal, round, and reactive to light.  Sclerae anicteric.  Oral mucosa are


moist.


NECK:  Supple.  There is no jugular venous distention.


LUNGS:  Left rales.


HEART:  Regular S1, S2.  No murmur or S3.


ABDOMEN:  Soft and nontender.  No palpable mass.


EXTREMITIES:  No cyanosis, clubbing, or edema.





Microbiology








 Date/Time


Source Procedure


Growth Status


 


 


 10/9/19 16:50


Nasopharynx - Final Complete


 


 10/9/19 16:50


Nasopharynx - Final Complete


 


 10/9/19 11:00


Urine,Clean Catch Urine Culture - Preliminary


NO GROWTH Resulted


 


 10/9/19 11:00


Rectum  Received











Laboratory Tests








Test


  10/10/19


05:40


 


White Blood Count


  24.2 K/UL


(4.8-10.8)  *H


 


Red Blood Count


  3.52 M/UL


(4.70-6.10)  L


 


Hemoglobin


  11.7 G/DL


(14.2-18.0)  L


 


Hematocrit


  35.5 %


(42.0-52.0)  L


 


Mean Corpuscular Volume


  101 FL (80-99)


H


 


Mean Corpuscular Hemoglobin


  33.1 PG


(27.0-31.0)  H


 


Mean Corpuscular Hemoglobin


Concent 32.9 G/DL


(32.0-36.0)


 


Red Cell Distribution Width


  12.6 %


(11.6-14.8)


 


Platelet Count


  233 K/UL


(150-450)


 


Mean Platelet Volume


  6.1 FL


(6.5-10.1)  L


 


Neutrophils (%) (Auto)


  % (45.0-75.0)


 


 


Lymphocytes (%) (Auto)


  % (20.0-45.0)


 


 


Monocytes (%) (Auto)  % (1.0-10.0)  


 


Eosinophils (%) (Auto)  % (0.0-3.0)  


 


Basophils (%) (Auto)  % (0.0-2.0)  


 


Differential Total Cells


Counted 100  


 


 


Neutrophils % (Manual) 95 % (45-75)  H


 


Lymphocytes % (Manual) 3 % (20-45)  L


 


Monocytes % (Manual) 2 % (1-10)  


 


Eosinophils % (Manual) 0 % (0-3)  


 


Basophils % (Manual) 0 % (0-2)  


 


Band Neutrophils 0 % (0-8)  


 


Platelet Estimate Adequate  


 


Platelet Morphology Normal  


 


Anisocytosis 1+  


 


Macrocytosis 1+  


 


Sodium Level


  142 MMOL/L


(136-145)


 


Potassium Level


  3.6 MMOL/L


(3.5-5.1)


 


Chloride Level


  107 MMOL/L


()


 


Carbon Dioxide Level


  28 MMOL/L


(21-32)


 


Anion Gap


  7 mmol/L


(5-15)


 


Blood Urea Nitrogen


  32 mg/dL


(7-18)  H


 


Creatinine


  1.1 MG/DL


(0.55-1.30)


 


Estimat Glomerular Filtration


Rate  mL/min (>60)  


 


 


Glucose Level


  76 MG/DL


()


 


Calcium Level


  8.4 MG/DL


(8.5-10.1)  L


 


Phosphorus Level


  3.1 MG/DL


(2.5-4.9)


 


Albumin


  2.0 G/DL


(3.4-5.0)  L











Current Medications








 Medications


  (Trade)  Dose


 Ordered  Sig/Colleen


 Route


 PRN Reason  Start Time


 Stop Time Status Last Admin


Dose Admin


 


 Acetaminophen


  (Tylenol)  650 mg  Q4H  PRN


 ORAL


 FEVER  10/9/19 12:45


 11/8/19 12:44   


 


 


 Albuterol/


 Ipratropium


  (Albuterol/


 Ipratropium)  3 ml  Q4H  PRN


 HHN


 Shortness of Breath  10/9/19 12:45


 10/14/19 12:44  10/10/19 11:24


 


 


 Azithromycin


  (Zithromax)  500 mg  DAILY


 ORAL


   10/10/19 09:00


 10/17/19 08:59  10/10/19 08:49


 


 


 Cefepime HCl 2 gm/


 Dextrose  110 ml @ 


 220 mls/hr  DAILY


 IV


   10/10/19 09:00


 10/17/19 08:59  10/10/19 08:53


 


 


 Dextrose


  (Dextrose 50%)  25 ml  Q30M  PRN


 IV


 Hypoglycemia  10/9/19 12:45


 11/8/19 12:44   


 


 


 Dextrose


  (Dextrose 50%)  50 ml  Q30M  PRN


 IV


 Hypoglycemia  10/9/19 12:45


 11/8/19 12:44   


 


 


 Digoxin


  (Lanoxin)  0.125 mg  DAILY


 ORAL


   10/10/19 09:00


 11/9/19 08:59  10/10/19 08:49


 


 


 Lorazepam


  (Ativan 2mg/ml


 1ml)  2 mg  Q2H  PRN


 IV


 For Anxiety  10/9/19 12:45


 10/16/19 12:44   


 


 


 Polyethylene


 Glycol


  (Miralax)  17 gm  DAILYPRN  PRN


 ORAL


 Constipation  10/9/19 12:45


 11/8/19 12:44   


 


 


 Promethazine HCl/


 Codeine


  (Phenergan with


 Codeine)  5 ml  Q4H  PRN


 ORAL


 For Cough  10/9/19 12:45


 11/8/19 12:44   


 


 


 Rivaroxaban


  (Xarelto)  15 mg  DAILY


 ORAL


   10/10/19 09:00


 11/9/19 08:59  10/10/19 08:49


 


 


 Sodium Chloride  1,000 ml @ 


 75 mls/hr  [id number]


 IV


   10/9/19 12:32


 11/8/19 12:31  10/10/19 02:16


 


 


 Tamsulosin HCl


  (Flomax)  0.4 mg  QHS


 ORAL


   10/9/19 21:00


 11/8/19 20:59  10/9/19 20:29


 


 


 Trazodone HCl


  (Desyrel)  25 mg  BEDTIME


 ORAL


   10/9/19 21:00


 11/8/19 20:59  10/9/19 20:29


 


 


 Vancomycin HCl


  (Vanco rx to


 dose)  1 ea  DAILY  PRN


 MISC


 Per rx protocol  10/9/19 13:45


 11/8/19 13:44   


 


 


 Vancomycin HCl


 500 mg/Dextrose  110 ml @ 


 110 mls/hr  Q24H


 IVPB


   10/10/19 15:00


 10/15/19 14:59   


 

















Opal Alamo M.D. Oct 10, 2019 12:16

## 2019-10-10 NOTE — HISTORY AND PHYSICAL REPORT
DATE OF ADMISSION:  10/09/2019

DATE AND TIME SEEN:  10/10/2019 at 9 a.m.



CONSULTANTS:

1. Lucy Carroll M.D.

2. Barry Thompson M.D.

3. Robert Ventura M.D.

4. Vero Andrea M.D.



CHIEF COMPLAINT:  Shortness of breath, pneumonia, sepsis, shock.



BRIEF HISTORY:  This is an 83-year-old male from [facility], presented with the above-mentioned diagnoses, admitted to

telemetry for further care.  Currently, O2 NC, in bed, slightly confused.

No complaint.



REVIEW OF SYSTEMS:  No chest pain.  Slight short of breath.  No nausea,

vomiting, or diarrhea.



PAST MEDICAL HISTORY:  Includes weakness.



PAST SURGICAL HISTORY:  Unknown.



ALLERGIES:  Denies.



SOCIAL HISTORY:  Positive smoking.  No alcohol.  No intravenous drug

abuse.



FAMILY HISTORY:  Noncontributory.



PHYSICAL EXAMINATION:

GENERAL:  Calm in bed, oriented x2, in no acute distress.

VITAL SIGNS:  Temperature is 97 degrees, pulse 63, respirations 20, blood

pressure 93/53.

CARDIOVASCULAR:  No murmur.

LUNGS:  Poor air exchange.

ABDOMEN:  Bowel sounds distant.

EXTREMITIES:  No cyanosis, clubbing, or edema.

NEUROLOGIC:  The patient moves all extremities, slightly weak.



LABORATORY AND DIAGNOSTIC DATA:  Labs at this time show white count 24,

hemoglobin and hematocrit 11/35, platelets 233.  BMP shows BUN 32, calcium

8.4.  Troponin 0.067.  Albumin 2.1.  Urinalysis shows 1+ leukocyte

esterase.



MEDICATIONS:  Include vancomycin, Xarelto, Lanoxin, cefepime, azithromycin,

_______, trazodone, albuterol, lorazepam, IV fluids.



ASSESSMENT:

1. Shortness of breath.

2. UTI.

3. Pneumonia.

4. Sepsis.

5. Shock.

6. Malnutrition

7. Elevated troponin.

8. Anemia.



PLAN:

1. O2, pulmonary treatment.

2. Antibiotics per Infectious Disease.

3. Cardiology followup.

4. Blood pressure control.

5. Resume home medications.

6. PT and dietary eval.

7. CBC, BMP in the morning.

8. Psych eval.









  ______________________________________________

  Timo Morejon D.O.





DR:  JELENA

D:  10/10/2019 09:41

T:  10/10/2019 16:52

JOB#:  [id number]

CC:

## 2019-10-10 NOTE — CARDIOLOGY PROGRESS NOTE
Assessment/Plan


Assessment/Plan


coronary artery disease s/p cabg


ischemic cardiomyopathy, ef 25 to 30%


biventricular ICD Medtronic device.


Afib on AC


HLD


cheronic systolic CHF


anemia


BPH 


schizophrenia


pneumonia 








bp is better 


lung exam sig  for rhonchi 


he feels better than pta  


will dc ivf 


he will get larg volume of ivf with each vancomycin 


repeat cxr adn trop adn bnp in amd 


would start on low dose of acei 


at risk for chf 


cr looks ok





Subjective


Cardiovascular:  Denies: chest pain


Respiratory:  Reports: orthopnea; Denies: shortness of breath


Gastrointestinal/Abdominal:  Denies: abdomen distended


Genitourinary:  Denies: burning





Objective





Last 24 Hour Vital Signs








  Date Time  Temp Pulse Resp B/P (MAP) Pulse Ox O2 Delivery O2 Flow Rate FiO2


 


10/10/19 16:00  66      


 


10/10/19 16:00 97.3 73 18 123/54 (77) 96   


 


10/10/19 12:00 97.3 68 18 122/78 (93) 95   


 


10/10/19 12:00  72      


 


10/10/19 11:34  78 18  98 Nasal Cannula 2.0 28





  71 18  97   


 


10/10/19 09:00  69      


 


10/10/19 09:00      Room Air  


 


10/10/19 08:49  63      


 


10/10/19 08:20  65 20  95 Room Air  21


 


10/10/19 08:00 97.2 63 20 93/53 (66) 96   


 


10/10/19 04:00  60      


 


10/10/19 04:00 97.6 78 18 104/68 (80) 96   


 


10/10/19 00:00 97.6 69 18 104/54 (71) 95   


 


10/10/19 00:00  62      


 


10/9/19 21:00      Room Air  


 


10/9/19 20:05  61 18  97 Room Air  21


 


10/9/19 20:00 97.2 66 18 102/53 (69) 95   


 


10/9/19 20:00  56      








General Appearance:  no apparent distress, alert


Cardiovascular:  normal rate


Respiratory/Chest:  rhonchi - bilaterally


Abdomen:  normal bowel sounds, non tender, soft


Extremities:  no swelling











Intake and Output  


 


 10/9/19 10/10/19





 19:00 07:00


 


Intake Total 1600 ml 


 


Output Total 200 ml 


 


Balance 1400 ml 


 


  


 


IV Total 1600 ml 


 


Output Urine Total 200 ml 


 


# Bowel Movements 1 1











Laboratory Tests








Test


  10/10/19


05:40


 


White Blood Count


  24.2 K/UL


(4.8-10.8)  *H


 


Red Blood Count


  3.52 M/UL


(4.70-6.10)  L


 


Hemoglobin


  11.7 G/DL


(14.2-18.0)  L


 


Hematocrit


  35.5 %


(42.0-52.0)  L


 


Mean Corpuscular Volume


  101 FL (80-99)


H


 


Mean Corpuscular Hemoglobin


  33.1 PG


(27.0-31.0)  H


 


Mean Corpuscular Hemoglobin


Concent 32.9 G/DL


(32.0-36.0)


 


Red Cell Distribution Width


  12.6 %


(11.6-14.8)


 


Platelet Count


  233 K/UL


(150-450)


 


Mean Platelet Volume


  6.1 FL


(6.5-10.1)  L


 


Neutrophils (%) (Auto)


  % (45.0-75.0)


 


 


Lymphocytes (%) (Auto)


  % (20.0-45.0)


 


 


Monocytes (%) (Auto)  % (1.0-10.0)  


 


Eosinophils (%) (Auto)  % (0.0-3.0)  


 


Basophils (%) (Auto)  % (0.0-2.0)  


 


Differential Total Cells


Counted 100  


 


 


Neutrophils % (Manual) 95 % (45-75)  H


 


Lymphocytes % (Manual) 3 % (20-45)  L


 


Monocytes % (Manual) 2 % (1-10)  


 


Eosinophils % (Manual) 0 % (0-3)  


 


Basophils % (Manual) 0 % (0-2)  


 


Band Neutrophils 0 % (0-8)  


 


Platelet Estimate Adequate  


 


Platelet Morphology Normal  


 


Anisocytosis 1+  


 


Macrocytosis 1+  


 


Sodium Level


  142 MMOL/L


(136-145)


 


Potassium Level


  3.6 MMOL/L


(3.5-5.1)


 


Chloride Level


  107 MMOL/L


()


 


Carbon Dioxide Level


  28 MMOL/L


(21-32)


 


Anion Gap


  7 mmol/L


(5-15)


 


Blood Urea Nitrogen


  32 mg/dL


(7-18)  H


 


Creatinine


  1.1 MG/DL


(0.55-1.30)


 


Estimat Glomerular Filtration


Rate  mL/min (>60)  


 


 


Glucose Level


  76 MG/DL


()


 


Calcium Level


  8.4 MG/DL


(8.5-10.1)  L


 


Phosphorus Level


  3.1 MG/DL


(2.5-4.9)


 


Albumin


  2.0 G/DL


(3.4-5.0)  L











Microbiology








 Date/Time


Source Procedure


Growth Status


 


 


 10/9/19 16:50


Nasopharynx - Final Complete


 


 10/9/19 16:50


Nasopharynx - Final Complete


 


 10/9/19 11:00


Urine,Clean Catch Urine Culture - Preliminary


NO GROWTH Resulted


 


 10/9/19 11:00


Rectum  Received

















Robert Ventura MD Oct 10, 2019 19:40

## 2019-10-11 VITALS — DIASTOLIC BLOOD PRESSURE: 56 MMHG | SYSTOLIC BLOOD PRESSURE: 130 MMHG

## 2019-10-11 VITALS — SYSTOLIC BLOOD PRESSURE: 119 MMHG | DIASTOLIC BLOOD PRESSURE: 65 MMHG

## 2019-10-11 VITALS — DIASTOLIC BLOOD PRESSURE: 64 MMHG | SYSTOLIC BLOOD PRESSURE: 123 MMHG

## 2019-10-11 VITALS — SYSTOLIC BLOOD PRESSURE: 99 MMHG | DIASTOLIC BLOOD PRESSURE: 60 MMHG

## 2019-10-11 VITALS — SYSTOLIC BLOOD PRESSURE: 121 MMHG | DIASTOLIC BLOOD PRESSURE: 60 MMHG

## 2019-10-11 VITALS — DIASTOLIC BLOOD PRESSURE: 58 MMHG | SYSTOLIC BLOOD PRESSURE: 119 MMHG

## 2019-10-11 LAB
ADD MANUAL DIFF: YES
ANION GAP SERPL CALC-SCNC: 9 MMOL/L (ref 5–15)
BUN SERPL-MCNC: 24 MG/DL (ref 7–18)
CALCIUM SERPL-MCNC: 8.5 MG/DL (ref 8.5–10.1)
CHLORIDE SERPL-SCNC: 108 MMOL/L (ref 98–107)
CO2 SERPL-SCNC: 24 MMOL/L (ref 21–32)
CREAT SERPL-MCNC: 0.9 MG/DL (ref 0.55–1.3)
ERYTHROCYTE [DISTWIDTH] IN BLOOD BY AUTOMATED COUNT: 12.7 % (ref 11.6–14.8)
HCT VFR BLD CALC: 33.9 % (ref 42–52)
HGB BLD-MCNC: 11.2 G/DL (ref 14.2–18)
MCV RBC AUTO: 100 FL (ref 80–99)
PLATELET # BLD: 239 K/UL (ref 150–450)
POTASSIUM SERPL-SCNC: 3.5 MMOL/L (ref 3.5–5.1)
RBC # BLD AUTO: 3.4 M/UL (ref 4.7–6.1)
SODIUM SERPL-SCNC: 141 MMOL/L (ref 136–145)
WBC # BLD AUTO: 16.3 K/UL (ref 4.8–10.8)

## 2019-10-11 RX ADMIN — RIVAROXABAN SCH MG: 15 TABLET, FILM COATED ORAL at 10:19

## 2019-10-11 RX ADMIN — TAMSULOSIN HYDROCHLORIDE SCH MG: 0.4 CAPSULE ORAL at 21:36

## 2019-10-11 RX ADMIN — VANCOMYCIN HYDROCHLORIDE SCH MLS/HR: 500 INJECTION, POWDER, LYOPHILIZED, FOR SOLUTION INTRAVENOUS at 15:54

## 2019-10-11 RX ADMIN — LISINOPRIL SCH MG: 2.5 TABLET ORAL at 10:18

## 2019-10-11 RX ADMIN — AZITHROMYCIN DIHYDRATE SCH MG: 250 TABLET, FILM COATED ORAL at 10:22

## 2019-10-11 RX ADMIN — ATORVASTATIN CALCIUM SCH MG: 20 TABLET, FILM COATED ORAL at 21:35

## 2019-10-11 RX ADMIN — SODIUM CHLORIDE SCH MLS/HR: 0.9 INJECTION INTRAVENOUS at 10:25

## 2019-10-11 RX ADMIN — TRAZODONE HYDROCHLORIDE SCH MG: 50 TABLET ORAL at 21:36

## 2019-10-11 RX ADMIN — CARVEDILOL SCH MG: 6.25 TABLET, FILM COATED ORAL at 10:22

## 2019-10-11 RX ADMIN — CARVEDILOL SCH MG: 6.25 TABLET, FILM COATED ORAL at 21:00

## 2019-10-11 RX ADMIN — DIGOXIN SCH MG: 0.12 TABLET ORAL at 09:00

## 2019-10-11 NOTE — PULMONOLOGY PROGRESS NOTE
Assessment/Plan


Problems:  


(1) Pneumonia


(2) Chronic anticoagulation


(3) HTN (hypertension)


(4) BPH (benign prostatic hyperplasia)


(5) CAD (coronary artery disease)


(6) COPD (chronic obstructive pulmonary disease)


(7) Atrial fibrillation


(8) CHF (congestive heart failure)


(9) Sepsis


Assessment/Plan


wbc decreasing


continue abx


pt didn't want CT chest with contrast, will get ct without contrast


no culture results are available yet


chest pt


sputum induction


heart rate controlled





Subjective


ROS Limited/Unobtainable:  No


Interval Events:  doing better


Allergies:  


Coded Allergies:  


     No Known Allergies (Unverified , 10/9/19)





Objective





Last 24 Hour Vital Signs








  Date Time  Temp Pulse Resp B/P (MAP) Pulse Ox O2 Delivery O2 Flow Rate FiO2


 


10/11/19 10:22  56  123/64    


 


10/11/19 10:18    123/64    


 


10/11/19 09:00  56      


 


10/11/19 07:31  73 18  93 Room Air  21


 


10/11/19 07:31     95 Nasal Cannula 2.0 28


 


10/11/19 04:00  60      


 


10/11/19 04:00 98.7 72 18 121/60 (80) 99   


 


10/11/19 00:00 97.5 66 18 130/56 (80) 97   


 


10/11/19 00:00  58      


 


10/10/19 21:15    117/69    


 


10/10/19 21:15  80  117/69    


 


10/10/19 21:00      Room Air  


 


10/10/19 20:00  80 18  94 Nasal Cannula 2.0 28


 


10/10/19 20:00     94 Nasal Cannula 2.0 28


 


10/10/19 20:00 97.8 18 18 117/69 (85) 95   


 


10/10/19 20:00  80      


 


10/10/19 16:00  66      


 


10/10/19 16:00 97.3 73 18 123/54 (77) 96   

















Intake and Output  


 


 10/10/19 10/11/19





 18:59 06:59


 


Intake Total 220 ml 


 


Balance 220 ml 


 


  


 


Intake Oral 220 ml 


 


# Voids 4 


 


# Bowel Movements 1 1








General Appearance:  cachetic


HEENT:  normocephalic, atraumatic


Respiratory/Chest:  chest wall non-tender, accessory muscle use, crackles/rales


Cardiovascular:  normal peripheral pulses, normal rate


Abdomen:  normal bowel sounds, soft, non tender


Genitourinary:  normal external genitalia


Extremities:  no cyanosis


Skin:  no rash


Neurologic/Psychiatric:  CNs II-XII grossly normal





Microbiology








 Date/Time


Source Procedure


Growth Status


 


 


 10/9/19 10:50


Blood Blood Culture - Preliminary


NO GROWTH AFTER 24 HOURS Resulted


 


 10/9/19 10:45


Blood Blood Culture - Preliminary


NO GROWTH AFTER 24 HOURS Resulted





 10/9/19 16:50


Nasopharynx - Final Complete


 


 10/9/19 16:50


Nasopharynx - Final Complete


 


 10/9/19 11:00


Nasal Nares MRSA Culture - Final


NO METHICILLIN RESISTANT STAPH AUREUS... Complete


 


 10/9/19 11:00


Urine,Clean Catch Urine Culture - Preliminary


NO GROWTH AFTER 24 HOURS Resulted


 


 10/9/19 11:00


Rectum VRE Culture - Final


Enterococcus Faecalis - Vre Complete


 


 10/9/19 11:00


Rectum - Final


NO CARBAPENEM-RESISTANT ENTEROBACTERI... Complete








Laboratory Tests


10/11/19 05:54: 


White Blood Count 16.3H, Red Blood Count 3.40L, Hemoglobin 11.2L, Hematocrit 

33.9L, Mean Corpuscular Volume 100H, Mean Corpuscular Hemoglobin 32.9H, Mean 

Corpuscular Hemoglobin Concent 33.0, Red Cell Distribution Width 12.7, Platelet 

Count 239, Mean Platelet Volume 6.3L, Neutrophils (%) (Auto) , Lymphocytes (%) (

Auto) , Monocytes (%) (Auto) , Eosinophils (%) (Auto) , Basophils (%) (Auto) , 

Differential Total Cells Counted 100, Neutrophils % (Manual) 92H, Lymphocytes % 

(Manual) 5L, Monocytes % (Manual) 1, Eosinophils % (Manual) 0, Basophils % (

Manual) 0, Band Neutrophils 2, Platelet Estimate Adequate, Platelet Morphology 

Normal, Hypochromasia 1+, Sodium Level 141, Potassium Level 3.5, Chloride Level 

108H, Carbon Dioxide Level 24, Anion Gap 9, Blood Urea Nitrogen 24H, Creatinine 

0.9, Estimat Glomerular Filtration Rate , Glucose Level 82, Calcium Level 8.5, 

Digoxin Level 0.8





Current Medications








 Medications


  (Trade)  Dose


 Ordered  Sig/Colleen


 Route


 PRN Reason  Start Time


 Stop Time Status Last Admin


Dose Admin


 


 Acetaminophen


  (Tylenol)  650 mg  Q4H  PRN


 ORAL


 FEVER  10/9/19 12:45


 11/8/19 12:44   


 


 


 Albuterol/


 Ipratropium


  (Albuterol/


 Ipratropium)  3 ml  Q4H  PRN


 HHN


 Shortness of Breath  10/9/19 12:45


 10/14/19 12:44  10/10/19 11:24


 


 


 Atorvastatin


 Calcium


  (Lipitor)  40 mg  BEDTIME


 ORAL


   10/10/19 21:00


 11/9/19 20:59  10/10/19 21:16


 


 


 Azithromycin


  (Zithromax)  500 mg  DAILY


 ORAL


   10/10/19 09:00


 10/17/19 08:59  10/11/19 10:22


 


 


 Carvedilol


  (Coreg)  6.25 mg  EVERY 12  HOURS


 ORAL


   10/10/19 21:00


 11/9/19 20:59  10/11/19 10:22


 


 


 Cefepime HCl 2 gm/


 Dextrose  110 ml @ 


 220 mls/hr  DAILY


 IV


   10/10/19 09:00


 10/17/19 08:59  10/11/19 10:25


 


 


 Dextrose


  (Dextrose 50%)  25 ml  Q30M  PRN


 IV


 Hypoglycemia  10/9/19 12:45


 11/8/19 12:44   


 


 


 Dextrose


  (Dextrose 50%)  50 ml  Q30M  PRN


 IV


 Hypoglycemia  10/9/19 12:45


 11/8/19 12:44   


 


 


 Digoxin


  (Lanoxin)  0.125 mg  DAILY


 ORAL


   10/11/19 09:00


 11/10/19 08:59   


 


 


 Lisinopril


  (Zestril)  5 mg  DAILY


 ORAL


   10/10/19 19:45


 11/9/19 19:44  10/11/19 10:18


 


 


 Lorazepam


  (Ativan 2mg/ml


 1ml)  2 mg  Q2H  PRN


 IV


 For Anxiety  10/9/19 12:45


 10/16/19 12:44   


 


 


 Polyethylene


 Glycol


  (Miralax)  17 gm  DAILYPRN  PRN


 ORAL


 Constipation  10/9/19 12:45


 11/8/19 12:44   


 


 


 Promethazine HCl/


 Codeine


  (Phenergan with


 Codeine)  5 ml  Q4H  PRN


 ORAL


 For Cough  10/9/19 12:45


 11/8/19 12:44   


 


 


 Rivaroxaban


  (Xarelto)  15 mg  DAILY


 ORAL


   10/10/19 09:00


 11/9/19 08:59  10/11/19 10:19


 


 


 Tamsulosin HCl


  (Flomax)  0.4 mg  QHS


 ORAL


   10/9/19 21:00


 11/8/19 20:59  10/10/19 21:15


 


 


 Trazodone HCl


  (Desyrel)  25 mg  BEDTIME


 ORAL


   10/9/19 21:00


 11/8/19 20:59  10/10/19 21:15


 


 


 Vancomycin HCl


  (Vanco rx to


 dose)  1 ea  DAILY  PRN


 MISC


 Per rx protocol  10/9/19 13:45


 11/8/19 13:44   


 


 


 Vancomycin HCl


 500 mg/Dextrose  110 ml @ 


 110 mls/hr  Q24H


 IVPB


   10/10/19 15:00


 10/15/19 14:59  10/10/19 15:30


 

















Lucy Carroll MD Oct 11, 2019 12:25

## 2019-10-11 NOTE — NUR
CASE MANAGEMENT: REVIEW



83Y/MALE BIBA FROM [facility] 



CC: SOB . WEAKNESS 





SI: HYPOTENSION . CHF . LEUKOCYTOSIS 

T 95.5 HR 89 RR 16 BP 74/42 SAT 91% NC/2L

WBC 27.7 H/H 11.9/35.7 TROP 0.067 







IS: NS IVF BOLUS X1

AZITHROMYCIN IV X1

ZOSYN IV X1

VANCO IV X1

CEFEPIME IV X1 





***PATIENT ADMITTED TO TELEMETRY UNIT 10/09/2019***

DCP: PATIENT IS FROM [facility]

## 2019-10-11 NOTE — GENERAL PROGRESS NOTE
Assessment/Plan


Problem List:  


(1) UTI (urinary tract infection)


ICD Codes:  N39.0 - Urinary tract infection, site not specified


SNOMED:  92340428


Qualifiers:  


   Qualified Codes:  N39.0 - Urinary tract infection, site not specified


(2) Sepsis


ICD Codes:  A41.9 - Sepsis, unspecified organism


SNOMED:  73424689


Qualifiers:  


   Qualified Codes:  A41.9 - Sepsis, unspecified organism


(3) Pneumonia


ICD Codes:  J18.9 - Pneumonia, unspecified organism


SNOMED:  765416376


Qualifiers:  


   Qualified Codes:  J18.1 - Lobar pneumonia, unspecified organism


(4) Hypotension


ICD Codes:  I95.9 - Hypotension, unspecified


SNOMED:  06564650


(5) CHF (congestive heart failure)


ICD Codes:  I50.9 - Heart failure, unspecified


SNOMED:  35663350


(6) HTN (hypertension)


ICD Codes:  I10 - Essential (primary) hypertension


SNOMED:  17507952


Status:  unchanged


Assessment/Plan:


o2 pulm tx abx pt diet cbc bmp am aru eval





Subjective


Constitutional:  Reports: weakness


Allergies:  


Coded Allergies:  


     No Known Allergies (Unverified , 10/9/19)


All Systems:  reviewed and negative except above


Subjective


o2nc calm in bed





Objective





Last 24 Hour Vital Signs








  Date Time  Temp Pulse Resp B/P (MAP) Pulse Ox O2 Delivery O2 Flow Rate FiO2


 


10/11/19 07:31  73 18  93 Room Air  21


 


10/11/19 07:31     95 Nasal Cannula 2.0 28


 


10/11/19 04:00  60      


 


10/11/19 04:00 98.7 72 18 121/60 (80) 99   


 


10/11/19 00:00 97.5 66 18 130/56 (80) 97   


 


10/11/19 00:00  58      


 


10/10/19 21:15    117/69    


 


10/10/19 21:15  80  117/69    


 


10/10/19 21:00      Room Air  


 


10/10/19 20:00  80 18  94 Nasal Cannula 2.0 28


 


10/10/19 20:00     94 Nasal Cannula 2.0 28


 


10/10/19 20:00 97.8 18 18 117/69 (85) 95   


 


10/10/19 20:00  80      


 


10/10/19 16:00  66      


 


10/10/19 16:00 97.3 73 18 123/54 (77) 96   


 


10/10/19 12:00 97.3 68 18 122/78 (93) 95   


 


10/10/19 12:00  72      


 


10/10/19 11:34  78 18  98 Nasal Cannula 2.0 28





  71 18  97   

















Intake and Output  


 


 10/10/19 10/11/19





 18:59 06:59


 


Intake Total 220 ml 


 


Balance 220 ml 


 


  


 


Intake Oral 220 ml 


 


# Voids 4 


 


# Bowel Movements 1 1








Laboratory Tests


10/11/19 05:54: 


White Blood Count 16.3H, Red Blood Count 3.40L, Hemoglobin 11.2L, Hematocrit 

33.9L, Mean Corpuscular Volume 100H, Mean Corpuscular Hemoglobin 32.9H, Mean 

Corpuscular Hemoglobin Concent 33.0, Red Cell Distribution Width 12.7, Platelet 

Count 239, Mean Platelet Volume 6.3L, Neutrophils (%) (Auto) , Lymphocytes (%) (

Auto) , Monocytes (%) (Auto) , Eosinophils (%) (Auto) , Basophils (%) (Auto) , 

Neutrophils % (Manual) [Pending], Lymphocytes % (Manual) [Pending], Platelet 

Estimate [Pending], Platelet Morphology [Pending], Sodium Level 141, Potassium 

Level 3.5, Chloride Level 108H, Carbon Dioxide Level 24, Anion Gap 9, Blood 

Urea Nitrogen 24H, Creatinine 0.9, Estimat Glomerular Filtration Rate , Glucose 

Level 82, Calcium Level 8.5, Digoxin Level 0.8


Height (Feet):  5


Height (Inches):  2.00


Weight (Pounds):  110


General Appearance:  lethargic


EENT:  normal ENT inspection


Neck:  normal alignment


Cardiovascular:  normal peripheral pulses, normal rate, regular rhythm


Respiratory/Chest:  chest wall non-tender, lungs clear, normal breath sounds


Abdomen:  normal bowel sounds, non tender, soft


Extremities:  normal inspection


Edema:  no edema noted Arm (L), no edema noted Arm (R), no edema noted Leg (L), 

no edema noted Leg (R), no edema noted Pedal (L), no edema noted Pedal (R), no 

edema noted Generalized


Neurologic:  responsive, motor weakness


Skin:  normal pigmentation, warm/dry











Timo Morejon DO Oct 11, 2019 09:07

## 2019-10-11 NOTE — NUR
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  Dr. Morejon 
stated "okay, I will forward to [facility]."

## 2019-10-11 NOTE — NUR
NURSE NOTES:

pt awake in bed.  food tray at bedside.  pt is having a hard time eating will notify doctor. 
 pt on cardiac monitor no sings of cardiac or respiratory distress at this time.  Bed locked 
in lowest position.  Call light with in reach.  Wife is at bedside.  will continue to mnitor 
pt.

## 2019-10-11 NOTE — NUR
NURSE NOTES:

per Dr. Cuevas pt to continue antibiotics for 4 weeks once he is DC.   Zyvox PO 600mg Q12hrs.

## 2019-10-11 NOTE — CARDIOLOGY REPORT
--------------- APPROVED REPORT --------------





EXAM: Two-dimensional and M-mode echocardiogram with Doppler and color Doppler.



INDICATION

LV FUNCTION



M-Mode DIMENSIONS 

IVSd0.9 (0.7-1.1cm)Left Atrium (MM)3.5 (1.6-4.0cm)

LVDd5.0 (3.5-5.6cm)Aortic Root2.5 (2.0-3.7cm)

PWd0.9 (0.7-1.1cm)Aortic Cusp Exc.1.1 (1.5-2.0cm)

IVSs1.2 cm

LVDs4.3 (2.5-4.0cm)

PWs1.2 cm





Mild left ventricular enlargement.

Global left ventricular hypokinesis with apical akinesis .

Left ventricular ejection fraction estimated to be 25-30 %.

No left ventricular hypertrophy.

No  pericardial effusion.

Mild left atrial enlargement.

Right cardiac chamber sizes are within normal limits. 

Aortic valve calcification with decreased cusp excursion c/w aortic stenosis.

Thickened mitral valve leaflets with normal excursion.

Mitral annulus and aortic root calcification.

Pulmonic valve not well visualized.

Normal tricuspid valve structure. 

IVC dilated at 2.5 cm with slightly physiologic collapse suggestive of increased RA 

pressure.

Pacemaker wire present in the right side chambers.



A  color flow and spectral Doppler study was performed and revealed:

No aortic insufficiency.

Peak aortic valve gradient of  11 mm Hg and a mean of 6 mmHg.

Aortic valve area   2.4 cm2 calculated by continuity equation.

Moderate mitral regurgitation.

Mitral diastolic velocities suggest reduced left ventricular relaxation c/w mild LV 

diastolic dysfunction (Grade I ).

Moderate  tricuspid regurgitation.

Tricuspid  systolic velocities suggests peak right ventricular systolic pressure of 46 

mmHg

consistent with moderate pulmonary hypertension.

Mild to moderate pulmonic regurgitation present.

## 2019-10-11 NOTE — CARDIOLOGY PROGRESS NOTE
Assessment/Plan


Assessment/Plan


coronary artery disease s/p cabg


ischemic cardiomyopathy, ef 25 to 30%


biventricular ICD Medtronic device.


Afib on AC


HLD


cheronic systolic CHF


anemia


BPH 


schizophrenia


pneumonia 








bp is better 


lung exam sig  for rhonchi 


he feels better than pta  


now off ivf 


he will get larg volume of ivf with each vancomycin 


repeat trop adn bnp in am 


increase acei dose is on coreg agian 


at risk for chf 


cr looks ok 





ct reprot noted 


1.  Consolidation within the left upper lobe.  Bilateral patchy 


groundglass and interstitial infiltrates.  Findings could be related to 


infectious inflammatory infiltrate.  Posttreatment imaging recommended to 


ensure resolution and exclude underlying neoplastic process malignancy.


2.  Bilateral pleural effusions as well as ascites and subcutaneous 


edema anasarca.


3.  Cardiomegaly





Subjective


Cardiovascular:  Denies: chest pain, lightheadedness, palpitations


Respiratory:  Denies: shortness of breath


Gastrointestinal/Abdominal:  Denies: abdominal pain


Genitourinary:  Denies: burning





Objective





Last 24 Hour Vital Signs








  Date Time  Temp Pulse Resp B/P (MAP) Pulse Ox O2 Delivery O2 Flow Rate FiO2


 


10/11/19 12:00 96.6 63 20 119/58 (78) 98   


 


10/11/19 10:22  56  123/64    


 


10/11/19 10:18    123/64    


 


10/11/19 09:00      Nasal Cannula 2.0 


 


10/11/19 09:00  56      


 


10/11/19 08:00 97.4 63 19 123/64 (83) 96   


 


10/11/19 07:31  73 18  93 Room Air  21


 


10/11/19 07:31     95 Nasal Cannula 2.0 28


 


10/11/19 04:00  60      


 


10/11/19 04:00 98.7 72 18 121/60 (80) 99   


 


10/11/19 00:00 97.5 66 18 130/56 (80) 97   


 


10/11/19 00:00  58      


 


10/10/19 21:15    117/69    


 


10/10/19 21:15  80  117/69    


 


10/10/19 21:00      Room Air  








General Appearance:  no apparent distress, alert


Neck:  supple


Cardiovascular:  regular rhythm


Respiratory/Chest:  rhonchi - bilaterally


Abdomen:  normal bowel sounds, non tender, soft


Extremities:  no swelling











Intake and Output  


 


 10/10/19 10/11/19





 19:00 07:00


 


Intake Total 220 ml 


 


Balance 220 ml 


 


  


 


Intake Oral 220 ml 


 


# Voids 4 


 


# Bowel Movements 1 1











Laboratory Tests








Test


  10/11/19


05:54


 


White Blood Count


  16.3 K/UL


(4.8-10.8)  H


 


Red Blood Count


  3.40 M/UL


(4.70-6.10)  L


 


Hemoglobin


  11.2 G/DL


(14.2-18.0)  L


 


Hematocrit


  33.9 %


(42.0-52.0)  L


 


Mean Corpuscular Volume


  100 FL (80-99)


H


 


Mean Corpuscular Hemoglobin


  32.9 PG


(27.0-31.0)  H


 


Mean Corpuscular Hemoglobin


Concent 33.0 G/DL


(32.0-36.0)


 


Red Cell Distribution Width


  12.7 %


(11.6-14.8)


 


Platelet Count


  239 K/UL


(150-450)


 


Mean Platelet Volume


  6.3 FL


(6.5-10.1)  L


 


Neutrophils (%) (Auto)


  % (45.0-75.0)


 


 


Lymphocytes (%) (Auto)


  % (20.0-45.0)


 


 


Monocytes (%) (Auto)  % (1.0-10.0)  


 


Eosinophils (%) (Auto)  % (0.0-3.0)  


 


Basophils (%) (Auto)  % (0.0-2.0)  


 


Differential Total Cells


Counted 100  


 


 


Neutrophils % (Manual) 92 % (45-75)  H


 


Lymphocytes % (Manual) 5 % (20-45)  L


 


Monocytes % (Manual) 1 % (1-10)  


 


Eosinophils % (Manual) 0 % (0-3)  


 


Basophils % (Manual) 0 % (0-2)  


 


Band Neutrophils 2 % (0-8)  


 


Platelet Estimate Adequate  


 


Platelet Morphology Normal  


 


Hypochromasia 1+  


 


Sodium Level


  141 MMOL/L


(136-145)


 


Potassium Level


  3.5 MMOL/L


(3.5-5.1)


 


Chloride Level


  108 MMOL/L


()  H


 


Carbon Dioxide Level


  24 MMOL/L


(21-32)


 


Anion Gap


  9 mmol/L


(5-15)


 


Blood Urea Nitrogen


  24 mg/dL


(7-18)  H


 


Creatinine


  0.9 MG/DL


(0.55-1.30)


 


Estimat Glomerular Filtration


Rate  mL/min (>60)  


 


 


Glucose Level


  82 MG/DL


()


 


Calcium Level


  8.5 MG/DL


(8.5-10.1)


 


Digoxin Level


  0.8 NG/ML


(0.5-2.0)











Microbiology








 Date/Time


Source Procedure


Growth Status


 


 


 10/9/19 10:50


Blood Blood Culture - Preliminary


NO GROWTH AFTER 24 HOURS Resulted


 


 10/9/19 10:45


Blood Blood Culture - Preliminary


NO GROWTH AFTER 24 HOURS Resulted





 10/9/19 16:50


Nasopharynx - Final Complete


 


 10/9/19 16:50


Nasopharynx - Final Complete


 


 10/9/19 11:00


Nasal Nares MRSA Culture - Final


NO METHICILLIN RESISTANT STAPH AUREUS... Complete


 


 10/9/19 11:00


Urine,Clean Catch Urine Culture - Preliminary


NO GROWTH AFTER 24 HOURS Resulted


 


 10/9/19 11:00


Rectum VRE Culture - Final


Enterococcus Faecalis - Vre Complete


 


 10/9/19 11:00


Rectum - Final


NO CARBAPENEM-RESISTANT ENTEROBACTERI... Complete

















Robert Ventura MD Oct 11, 2019 20:11

## 2019-10-11 NOTE — NUR
RD ASSESSMENT & RECOMMENDATIONS

SEE CARE ACTIVITY FOR COMPLETE ASSESSMENT



DAILY ESTIMATED NEEDS:

Needs based on Wound, wasting 51kg  

30-35  kcals/kg 

[id number]  total kcals

1.25-1.5  g protein/kg

64-77  g total protein 

25-30  mL/kg

7573-1491  total fluid mLs



NUTRITION DIAGNOSIS:

Increased kcal and pro needs r/t sepsis, wound healing and generalized

wasting as evidenced by critically elev WBC on adm (27.7*-> 16.3), stage 1

sacral wound, pt w/ moderate generalized wasting.



CURRENT DIET: cardiac, ms chopped     





PO DIET RECOMMENDATIONS:

Liberalized LOW NA diet - TEXTURE per SLP  





ADDITIONAL RECOMMENDATIONS:

1) Maintain calibrated bed scale wts 

2) Add Ensure BID  

    + snacks in b/w meals  

3) f/up w/ SLP for appropriate texture d/t poor dentition  

4) Wound care: add COLEEN BID + Vit C 250mg daily

## 2019-10-11 NOTE — INFECTIOUS DISEASES PROG NOTE
Assessment/Plan


Assessment/Plan








Assessment:


SEvere sepsis 2ry to PNA


Transient hypotension


    -CXR:  Left upper lobe consolidation most likely due to pneumonia


    -influenza sc, legionella ag urine neg


    -sp cx p





 Afebrile


Leukocytosis; improving


   -u/a wbc 15-20, nit neg, leuk +1; ucx NTD





Lactic acidosis, mild- resolved





Afib on AC


HTN


HLD


CHF


anemia


BPH 


schizophrenia


CAD/MI s/p CABG


 s/p AICD


 tobacco abuse


 cardiomyopathy EF 25-30%


[facility]


 


Plan:


-Continue empiric IV Vancomycin, Cefepime #3 pending sp cx


-Continue azithromycin #3/5


   -10/9 SP ZOsyn x1





-f/u cx


-Monitor CBC/CMP, temperatures


-f/u sp cx


-aspiration precautions





Thank you for this consultation. Will continue to follow along with you.





Discussed with RN





Subjective


Allergies:  


Coded Allergies:  


     No Known Allergies (Unverified , 10/9/19)


Subjective


afebrile


wbc improving





Objective


Vital Signs





Last 24 Hour Vital Signs








  Date Time  Temp Pulse Resp B/P (MAP) Pulse Ox O2 Delivery O2 Flow Rate FiO2


 


10/11/19 10:22  56  123/64    


 


10/11/19 10:18    123/64    


 


10/11/19 09:00  56      


 


10/11/19 07:31  73 18  93 Room Air  21


 


10/11/19 07:31     95 Nasal Cannula 2.0 28


 


10/11/19 04:00  60      


 


10/11/19 04:00 98.7 72 18 121/60 (80) 99   


 


10/11/19 00:00 97.5 66 18 130/56 (80) 97   


 


10/11/19 00:00  58      


 


10/10/19 21:15    117/69    


 


10/10/19 21:15  80  117/69    


 


10/10/19 21:00      Room Air  


 


10/10/19 20:00  80 18  94 Nasal Cannula 2.0 28


 


10/10/19 20:00     94 Nasal Cannula 2.0 28


 


10/10/19 20:00 97.8 18 18 117/69 (85) 95   


 


10/10/19 20:00  80      


 


10/10/19 16:00  66      


 


10/10/19 16:00 97.3 73 18 123/54 (77) 96   








Height (Feet):  5


Height (Inches):  2.00


Weight (Pounds):  110


Objective


GENERAL:  A thin elderly appearing  male, in no acute


distress.


HEENT:  Normocephalic and atraumatic.  Bitemporal wasting.  Pupils are


equal, round, and reactive to light.  Sclerae anicteric.  Oral mucosa are


moist.


NECK:  Supple.  There is no jugular venous distention.


LUNGS:  Left rales.


HEART:  Regular S1, S2.  No murmur or S3.


ABDOMEN:  Soft and nontender.  No palpable mass.


EXTREMITIES:  No cyanosis, clubbing, or edema.





Microbiology








 Date/Time


Source Procedure


Growth Status


 


 


 10/9/19 10:50


Blood Blood Culture - Preliminary


NO GROWTH AFTER 24 HOURS Resulted


 


 10/9/19 10:45


Blood Blood Culture - Preliminary


NO GROWTH AFTER 24 HOURS Resulted





 10/9/19 16:50


Nasopharynx - Final Complete


 


 10/9/19 16:50


Nasopharynx - Final Complete


 


 10/9/19 11:00


Nasal Nares MRSA Culture - Final


NO METHICILLIN RESISTANT STAPH AUREUS... Complete


 


 10/9/19 11:00


Urine,Clean Catch Urine Culture - Preliminary


NO GROWTH AFTER 24 HOURS Resulted


 


 10/9/19 11:00


Rectum VRE Culture - Final


Enterococcus Faecalis - Vre Complete


 


 10/9/19 11:00


Rectum - Final


NO CARBAPENEM-RESISTANT ENTEROBACTERI... Complete











Laboratory Tests








Test


  10/11/19


05:54


 


White Blood Count


  16.3 K/UL


(4.8-10.8)  H


 


Red Blood Count


  3.40 M/UL


(4.70-6.10)  L


 


Hemoglobin


  11.2 G/DL


(14.2-18.0)  L


 


Hematocrit


  33.9 %


(42.0-52.0)  L


 


Mean Corpuscular Volume


  100 FL (80-99)


H


 


Mean Corpuscular Hemoglobin


  32.9 PG


(27.0-31.0)  H


 


Mean Corpuscular Hemoglobin


Concent 33.0 G/DL


(32.0-36.0)


 


Red Cell Distribution Width


  12.7 %


(11.6-14.8)


 


Platelet Count


  239 K/UL


(150-450)


 


Mean Platelet Volume


  6.3 FL


(6.5-10.1)  L


 


Neutrophils (%) (Auto)


  % (45.0-75.0)


 


 


Lymphocytes (%) (Auto)


  % (20.0-45.0)


 


 


Monocytes (%) (Auto)  % (1.0-10.0)  


 


Eosinophils (%) (Auto)  % (0.0-3.0)  


 


Basophils (%) (Auto)  % (0.0-2.0)  


 


Differential Total Cells


Counted 100  


 


 


Neutrophils % (Manual) 92 % (45-75)  H


 


Lymphocytes % (Manual) 5 % (20-45)  L


 


Monocytes % (Manual) 1 % (1-10)  


 


Eosinophils % (Manual) 0 % (0-3)  


 


Basophils % (Manual) 0 % (0-2)  


 


Band Neutrophils 2 % (0-8)  


 


Platelet Estimate Adequate  


 


Platelet Morphology Normal  


 


Hypochromasia 1+  


 


Sodium Level


  141 MMOL/L


(136-145)


 


Potassium Level


  3.5 MMOL/L


(3.5-5.1)


 


Chloride Level


  108 MMOL/L


()  H


 


Carbon Dioxide Level


  24 MMOL/L


(21-32)


 


Anion Gap


  9 mmol/L


(5-15)


 


Blood Urea Nitrogen


  24 mg/dL


(7-18)  H


 


Creatinine


  0.9 MG/DL


(0.55-1.30)


 


Estimat Glomerular Filtration


Rate  mL/min (>60)  


 


 


Glucose Level


  82 MG/DL


()


 


Calcium Level


  8.5 MG/DL


(8.5-10.1)


 


Digoxin Level


  0.8 NG/ML


(0.5-2.0)











Current Medications








 Medications


  (Trade)  Dose


 Ordered  Sig/Colleen


 Route


 PRN Reason  Start Time


 Stop Time Status Last Admin


Dose Admin


 


 Acetaminophen


  (Tylenol)  650 mg  Q4H  PRN


 ORAL


 FEVER  10/9/19 12:45


 11/8/19 12:44   


 


 


 Albuterol/


 Ipratropium


  (Albuterol/


 Ipratropium)  3 ml  Q4H  PRN


 HHN


 Shortness of Breath  10/9/19 12:45


 10/14/19 12:44  10/10/19 11:24


 


 


 Atorvastatin


 Calcium


  (Lipitor)  40 mg  BEDTIME


 ORAL


   10/10/19 21:00


 11/9/19 20:59  10/10/19 21:16


 


 


 Azithromycin


  (Zithromax)  500 mg  DAILY


 ORAL


   10/10/19 09:00


 10/17/19 08:59  10/11/19 10:22


 


 


 Carvedilol


  (Coreg)  6.25 mg  EVERY 12  HOURS


 ORAL


   10/10/19 21:00


 11/9/19 20:59  10/11/19 10:22


 


 


 Cefepime HCl 2 gm/


 Dextrose  110 ml @ 


 220 mls/hr  DAILY


 IV


   10/10/19 09:00


 10/17/19 08:59  10/11/19 10:25


 


 


 Dextrose


  (Dextrose 50%)  25 ml  Q30M  PRN


 IV


 Hypoglycemia  10/9/19 12:45


 11/8/19 12:44   


 


 


 Dextrose


  (Dextrose 50%)  50 ml  Q30M  PRN


 IV


 Hypoglycemia  10/9/19 12:45


 11/8/19 12:44   


 


 


 Digoxin


  (Lanoxin)  0.125 mg  DAILY


 ORAL


   10/11/19 09:00


 11/10/19 08:59   


 


 


 Lisinopril


  (Zestril)  5 mg  DAILY


 ORAL


   10/10/19 19:45


 11/9/19 19:44  10/11/19 10:18


 


 


 Lorazepam


  (Ativan 2mg/ml


 1ml)  2 mg  Q2H  PRN


 IV


 For Anxiety  10/9/19 12:45


 10/16/19 12:44   


 


 


 Polyethylene


 Glycol


  (Miralax)  17 gm  DAILYPRN  PRN


 ORAL


 Constipation  10/9/19 12:45


 11/8/19 12:44   


 


 


 Promethazine HCl/


 Codeine


  (Phenergan with


 Codeine)  5 ml  Q4H  PRN


 ORAL


 For Cough  10/9/19 12:45


 11/8/19 12:44   


 


 


 Rivaroxaban


  (Xarelto)  15 mg  DAILY


 ORAL


   10/10/19 09:00


 11/9/19 08:59  10/11/19 10:19


 


 


 Tamsulosin HCl


  (Flomax)  0.4 mg  QHS


 ORAL


   10/9/19 21:00


 11/8/19 20:59  10/10/19 21:15


 


 


 Trazodone HCl


  (Desyrel)  25 mg  BEDTIME


 ORAL


   10/9/19 21:00


 11/8/19 20:59  10/10/19 21:15


 


 


 Vancomycin HCl


  (Vanco rx to


 dose)  1 ea  DAILY  PRN


 MISC


 Per rx protocol  10/9/19 13:45


 11/8/19 13:44   


 


 


 Vancomycin HCl


 500 mg/Dextrose  110 ml @ 


 110 mls/hr  Q24H


 IVPB


   10/10/19 15:00


 10/15/19 14:59  10/10/19 15:30


 

















Opal Alamo M.D. Oct 11, 2019 12:51

## 2019-10-12 VITALS — DIASTOLIC BLOOD PRESSURE: 49 MMHG | SYSTOLIC BLOOD PRESSURE: 103 MMHG

## 2019-10-12 VITALS — DIASTOLIC BLOOD PRESSURE: 59 MMHG | SYSTOLIC BLOOD PRESSURE: 129 MMHG

## 2019-10-12 VITALS — DIASTOLIC BLOOD PRESSURE: 57 MMHG | SYSTOLIC BLOOD PRESSURE: 121 MMHG

## 2019-10-12 VITALS — DIASTOLIC BLOOD PRESSURE: 53 MMHG | SYSTOLIC BLOOD PRESSURE: 110 MMHG

## 2019-10-12 VITALS — SYSTOLIC BLOOD PRESSURE: 120 MMHG | DIASTOLIC BLOOD PRESSURE: 60 MMHG

## 2019-10-12 VITALS — SYSTOLIC BLOOD PRESSURE: 123 MMHG | DIASTOLIC BLOOD PRESSURE: 60 MMHG

## 2019-10-12 LAB
ADD MANUAL DIFF: NO
ANION GAP SERPL CALC-SCNC: 9 MMOL/L (ref 5–15)
BASOPHILS NFR BLD AUTO: 0.4 % (ref 0–2)
BUN SERPL-MCNC: 19 MG/DL (ref 7–18)
CALCIUM SERPL-MCNC: 8.4 MG/DL (ref 8.5–10.1)
CHLORIDE SERPL-SCNC: 109 MMOL/L (ref 98–107)
CO2 SERPL-SCNC: 26 MMOL/L (ref 21–32)
CREAT SERPL-MCNC: 0.7 MG/DL (ref 0.55–1.3)
EOSINOPHIL NFR BLD AUTO: 0.1 % (ref 0–3)
ERYTHROCYTE [DISTWIDTH] IN BLOOD BY AUTOMATED COUNT: 12.6 % (ref 11.6–14.8)
HCT VFR BLD CALC: 32.9 % (ref 42–52)
HGB BLD-MCNC: 10.7 G/DL (ref 14.2–18)
LYMPHOCYTES NFR BLD AUTO: 13.6 % (ref 20–45)
MCV RBC AUTO: 100 FL (ref 80–99)
MONOCYTES NFR BLD AUTO: 6.5 % (ref 1–10)
NEUTROPHILS NFR BLD AUTO: 79.3 % (ref 45–75)
PLATELET # BLD: 212 K/UL (ref 150–450)
POTASSIUM SERPL-SCNC: 3.8 MMOL/L (ref 3.5–5.1)
RBC # BLD AUTO: 3.3 M/UL (ref 4.7–6.1)
SODIUM SERPL-SCNC: 144 MMOL/L (ref 136–145)
WBC # BLD AUTO: 8.5 K/UL (ref 4.8–10.8)

## 2019-10-12 RX ADMIN — TRAZODONE HYDROCHLORIDE SCH MG: 50 TABLET ORAL at 21:34

## 2019-10-12 RX ADMIN — ATORVASTATIN CALCIUM SCH MG: 20 TABLET, FILM COATED ORAL at 21:33

## 2019-10-12 RX ADMIN — LISINOPRIL SCH MG: 10 TABLET ORAL at 09:08

## 2019-10-12 RX ADMIN — CARVEDILOL SCH MG: 6.25 TABLET, FILM COATED ORAL at 09:08

## 2019-10-12 RX ADMIN — CARVEDILOL SCH MG: 6.25 TABLET, FILM COATED ORAL at 21:35

## 2019-10-12 RX ADMIN — RIVAROXABAN SCH MG: 15 TABLET, FILM COATED ORAL at 09:08

## 2019-10-12 RX ADMIN — DONEPEZIL HYDROCHLORIDE SCH MG: 5 TABLET, FILM COATED ORAL at 21:34

## 2019-10-12 RX ADMIN — AZITHROMYCIN DIHYDRATE SCH MG: 250 TABLET, FILM COATED ORAL at 09:08

## 2019-10-12 RX ADMIN — SODIUM CHLORIDE SCH MLS/HR: 0.9 INJECTION INTRAVENOUS at 09:09

## 2019-10-12 RX ADMIN — TAMSULOSIN HYDROCHLORIDE SCH MG: 0.4 CAPSULE ORAL at 21:33

## 2019-10-12 RX ADMIN — DIGOXIN SCH MG: 0.12 TABLET ORAL at 09:08

## 2019-10-12 RX ADMIN — VANCOMYCIN HYDROCHLORIDE SCH MLS/HR: 500 INJECTION, POWDER, LYOPHILIZED, FOR SOLUTION INTRAVENOUS at 15:34

## 2019-10-12 NOTE — NUR
NURSE NOTES:

Received report from Clara/RN, Patient is awake, lying semi-franks's, resting comfortably. 
On 2L nasal canula, No acute distress/SOB noted. AAO x4, Able to makes need known. Denies 
pain at this time. IV site patent, no bleeding or infiltration noted. Condom cath draining 
well to gravity. Bed in low position and locked. Call light within reach, Will continue plan 
of care.

## 2019-10-12 NOTE — NUR
NURSE NOTES:

received patient from RAHEEL Kwon, patient in stable condition, AOx4, denies pain at this time, 
Iv site on right FA, g24, bed low&locked . side rails up x3, call light within reach will 
continue to monitor and reassess

## 2019-10-12 NOTE — PULMONOLOGY PROGRESS NOTE
Assessment/Plan


Assessment/Plan


ASSESSMENT


severe sepsis 2 to PNA


PNA


chronic systolic CHF


ischemic CM with EF 25-30%


A fib/ on a/c


AICD


CAD with hx of CABG


COPD,


HLD


Anemia


BPH


Schizophrenia





PLAN OF CARE


tele


O2 ; HHN; CPT


CT chest noted with  consolidation within the left upper lobe.  Bilateral 

patchy 


groundglass and interstitial infiltrates.  Findings could be related to 


infectious inflammatory infiltrate.   


abx - per ID


SCX if able 


BCX negative, UCX negative


fup with CXR





CT chest to be repeated as outpatient to ensure resolution of infection 


ECHO with EF 25-30%


CHF management with  guideline directed medical therapy: BB, ACE/low dose, no 

need for diuretics at present


a/c with Xarelto ; rate control/BB, Digoxin


continue statin


monitor HH with goal to keep Hgb > 7


cont Flomax


DNR/DNI status 





case discussed and evaluated by supervising physician





Subjective


Allergies:  


Coded Allergies:  


     No Known Allergies (Unverified , 10/9/19)


Subjective


leuk resolved, afebrile


feeling better





Objective





Last 24 Hour Vital Signs








  Date Time  Temp Pulse Resp B/P (MAP) Pulse Ox O2 Delivery O2 Flow Rate FiO2


 


10/12/19 04:00 98.2 62 18 123/60 (81) 97   


 


10/12/19 04:00  72      


 


10/12/19 00:00 98.2 64 18 120/60 (80) 98   


 


10/12/19 00:00  62      


 


10/11/19 21:00  62  99/60    


 


10/11/19 21:00      Nasal Cannula 2.0 


 


10/11/19 20:56     97 Nasal Cannula 3.0 32


 


10/11/19 20:55  77 18  97 Nasal Cannula 3.0 32


 


10/11/19 20:00 98.0 62 18 99/60 (73) 99   


 


10/11/19 20:00  68      


 


10/11/19 16:00  69      


 


10/11/19 16:00 97.7 67 20 119/65 (83) 93   


 


10/11/19 12:00 96.6 63 20 119/58 (78) 98   


 


10/11/19 12:00  64      


 


10/11/19 10:22  56  123/64    


 


10/11/19 10:18    123/64    


 


10/11/19 09:00      Nasal Cannula 2.0 


 


10/11/19 09:00  56      

















Intake and Output  


 


 10/11/19 10/12/19





 19:00 07:00


 


Intake Total  120 ml


 


Output Total  300 ml


 


Balance  -180 ml


 


  


 


Intake Oral  120 ml


 


Output Urine Total  300 ml


 


# Bowel Movements 1 








General Appearance:  no acute distress


HEENT:  normocephalic, atraumatic, anicteric, mucous membranes moist


Respiratory/Chest:  chest wall non-tender, no respiratory distress, no 

accessory muscle use, rhonchi - isolated rhonchi , few bibasilar crackles


Abdomen:  normal bowel sounds, soft, non tender, non distended


Extremities:  no edema


Neurologic/Psychiatric:  alert, responsive


Musculoskeletal:  atrophy - BLE





Microbiology








 Date/Time


Source Procedure


Growth Status


 


 


 10/9/19 10:50


Blood Blood Culture - Preliminary


NO GROWTH AFTER 48 HOURS Resulted


 


 10/9/19 10:45


Blood Blood Culture - Preliminary


NO GROWTH AFTER 48 HOURS Resulted





 10/9/19 16:50


Nasopharynx - Final Complete


 


 10/9/19 16:50


Nasopharynx - Final Complete


 


 10/9/19 11:00


Nasal Nares MRSA Culture - Final


NO METHICILLIN RESISTANT STAPH AUREUS... Complete


 


 10/9/19 11:00


Urine,Clean Catch Urine Culture - Final


NO GROWTH AFTER 48 HOURS Complete


 


 10/9/19 11:00


Rectum VRE Culture - Final


Enterococcus Faecalis - Vre Complete


 


 10/9/19 11:00


Rectum - Final


NO CARBAPENEM-RESISTANT ENTEROBACTERI... Complete








Laboratory Tests


10/12/19 06:22: 


White Blood Count 8.5, Red Blood Count 3.30L, Hemoglobin 10.7L, Hematocrit 32.9L

, Mean Corpuscular Volume 100H, Mean Corpuscular Hemoglobin 32.5H, Mean 

Corpuscular Hemoglobin Concent 32.6, Red Cell Distribution Width 12.6, Platelet 

Count 212, Mean Platelet Volume 6.4L, Neutrophils (%) (Auto) 79.3H, Lymphocytes 

(%) (Auto) 13.6L, Monocytes (%) (Auto) 6.5, Eosinophils (%) (Auto) 0.1, 

Basophils (%) (Auto) 0.4, Sodium Level [Pending], Potassium Level [Pending], 

Chloride Level [Pending], Carbon Dioxide Level [Pending], Blood Urea Nitrogen [

Pending], Creatinine [Pending], Estimat Glomerular Filtration Rate [Pending], 

Glucose Level [Pending], Calcium Level [Pending], Troponin I 0.029





Current Medications








 Medications


  (Trade)  Dose


 Ordered  Sig/Colleen


 Route


 PRN Reason  Start Time


 Stop Time Status Last Admin


Dose Admin


 


 Acetaminophen


  (Tylenol)  650 mg  Q4H  PRN


 ORAL


 FEVER  10/9/19 12:45


 11/8/19 12:44   


 


 


 Albuterol/


 Ipratropium


  (Albuterol/


 Ipratropium)  3 ml  Q4H  PRN


 HHN


 Shortness of Breath  10/9/19 12:45


 10/14/19 12:44  10/10/19 11:24


 


 


 Atorvastatin


 Calcium


  (Lipitor)  40 mg  BEDTIME


 ORAL


   10/10/19 21:00


 11/9/19 20:59  10/11/19 21:35


 


 


 Azithromycin


  (Zithromax)  500 mg  DAILY


 ORAL


   10/10/19 09:00


 10/17/19 08:59  10/11/19 10:22


 


 


 Carvedilol


  (Coreg)  6.25 mg  EVERY 12  HOURS


 ORAL


   10/10/19 21:00


 11/9/19 20:59  10/11/19 10:22


 


 


 Cefepime HCl 2 gm/


 Dextrose  110 ml @ 


 220 mls/hr  DAILY


 IV


   10/10/19 09:00


 10/17/19 08:59  10/11/19 10:25


 


 


 Dextrose


  (Dextrose 50%)  25 ml  Q30M  PRN


 IV


 Hypoglycemia  10/9/19 12:45


 11/8/19 12:44   


 


 


 Dextrose


  (Dextrose 50%)  50 ml  Q30M  PRN


 IV


 Hypoglycemia  10/9/19 12:45


 11/8/19 12:44   


 


 


 Digoxin


  (Lanoxin)  0.125 mg  DAILY


 ORAL


   10/11/19 09:00


 11/10/19 08:59   


 


 


 Lisinopril


  (Zestril)  10 mg  DAILY


 ORAL


   10/12/19 09:00


 11/11/19 08:59   


 


 


 Lorazepam


  (Ativan 2mg/ml


 1ml)  2 mg  Q2H  PRN


 IV


 For Anxiety  10/9/19 12:45


 10/16/19 12:44   


 


 


 Polyethylene


 Glycol


  (Miralax)  17 gm  DAILYPRN  PRN


 ORAL


 Constipation  10/9/19 12:45


 11/8/19 12:44   


 


 


 Promethazine HCl/


 Codeine


  (Phenergan with


 Codeine)  5 ml  Q4H  PRN


 ORAL


 For Cough  10/9/19 12:45


 11/8/19 12:44   


 


 


 Rivaroxaban


  (Xarelto)  15 mg  DAILY


 ORAL


   10/10/19 09:00


 11/9/19 08:59  10/11/19 10:19


 


 


 Tamsulosin HCl


  (Flomax)  0.4 mg  QHS


 ORAL


   10/9/19 21:00


 11/8/19 20:59  10/11/19 21:36


 


 


 Trazodone HCl


  (Desyrel)  25 mg  BEDTIME


 ORAL


   10/9/19 21:00


 11/8/19 20:59  10/11/19 21:36


 


 


 Vancomycin HCl


  (Vanco rx to


 dose)  1 ea  DAILY  PRN


 MISC


 Per rx protocol  10/9/19 13:45


 11/8/19 13:44   


 


 


 Vancomycin HCl


 500 mg/Dextrose  110 ml @ 


 110 mls/hr  Q24H


 IVPB


   10/10/19 15:00


 10/15/19 14:59  10/11/19 15:54


 

















Ava Lee NP Oct 12, 2019 08:36

## 2019-10-12 NOTE — GENERAL PROGRESS NOTE
Assessment/Plan


Problem List:  


(1) UTI (urinary tract infection)


ICD Codes:  N39.0 - Urinary tract infection, site not specified


SNOMED:  43663824


Qualifiers:  


   Qualified Codes:  N39.0 - Urinary tract infection, site not specified


(2) Sepsis


ICD Codes:  A41.9 - Sepsis, unspecified organism


SNOMED:  85747593


Qualifiers:  


   Qualified Codes:  A41.9 - Sepsis, unspecified organism


(3) Pneumonia


ICD Codes:  J18.9 - Pneumonia, unspecified organism


SNOMED:  408583711


Qualifiers:  


   Qualified Codes:  J18.1 - Lobar pneumonia, unspecified organism


(4) Hypotension


ICD Codes:  I95.9 - Hypotension, unspecified


SNOMED:  15917363


(5) CHF (congestive heart failure)


ICD Codes:  I50.9 - Heart failure, unspecified


SNOMED:  70544106


(6) HTN (hypertension)


ICD Codes:  I10 - Essential (primary) hypertension


SNOMED:  24581123


Status:  stable, progressing, unchanged


Assessment/Plan:


o2 pulm tx abx pt diet cbc bmp am aru eval





Subjective


Constitutional:  Reports: weakness


Allergies:  


Coded Allergies:  


     No Known Allergies (Unverified , 10/9/19)


All Systems:  reviewed and negative except above


Subjective


o2nc calm in bed





Objective





Last 24 Hour Vital Signs








  Date Time  Temp Pulse Resp B/P (MAP) Pulse Ox O2 Delivery O2 Flow Rate FiO2


 


10/12/19 09:08    129/59    


 


10/12/19 09:08  72      


 


10/12/19 09:08  79  129/59    


 


10/12/19 04:00 98.2 62 18 123/60 (81) 97   


 


10/12/19 04:00  72      


 


10/12/19 00:00 98.2 64 18 120/60 (80) 98   


 


10/12/19 00:00  62      


 


10/11/19 21:00  62  99/60    


 


10/11/19 21:00      Nasal Cannula 2.0 


 


10/11/19 20:56     97 Nasal Cannula 3.0 32


 


10/11/19 20:55  77 18  97 Nasal Cannula 3.0 32


 


10/11/19 20:00 98.0 62 18 99/60 (73) 99   


 


10/11/19 20:00  68      


 


10/11/19 16:00  69      


 


10/11/19 16:00 97.7 67 20 119/65 (83) 93   


 


10/11/19 12:00 96.6 63 20 119/58 (78) 98   


 


10/11/19 12:00  64      

















Intake and Output  


 


 10/11/19 10/12/19





 18:59 06:59


 


Intake Total  120 ml


 


Output Total  300 ml


 


Balance  -180 ml


 


  


 


Intake Oral  120 ml


 


Output Urine Total  300 ml


 


# Bowel Movements 1 








Laboratory Tests


10/12/19 06:22: 


White Blood Count 8.5, Red Blood Count 3.30L, Hemoglobin 10.7L, Hematocrit 32.9L

, Mean Corpuscular Volume 100H, Mean Corpuscular Hemoglobin 32.5H, Mean 

Corpuscular Hemoglobin Concent 32.6, Red Cell Distribution Width 12.6, Platelet 

Count 212, Mean Platelet Volume 6.4L, Neutrophils (%) (Auto) 79.3H, Lymphocytes 

(%) (Auto) 13.6L, Monocytes (%) (Auto) 6.5, Eosinophils (%) (Auto) 0.1, 

Basophils (%) (Auto) 0.4, Sodium Level 144, Potassium Level 3.8, Chloride Level 

109H, Carbon Dioxide Level 26, Anion Gap 9, Blood Urea Nitrogen 19H, Creatinine 

0.7, Estimat Glomerular Filtration Rate , Glucose Level 75, Calcium Level 8.4L, 

Troponin I 0.029


Height (Feet):  5


Height (Inches):  2.00


Weight (Pounds):  110


General Appearance:  lethargic


EENT:  normal ENT inspection


Neck:  normal alignment


Cardiovascular:  normal peripheral pulses, normal rate, regular rhythm


Respiratory/Chest:  chest wall non-tender, lungs clear, normal breath sounds


Abdomen:  normal bowel sounds, non tender, soft


Extremities:  normal inspection


Edema:  no edema noted Arm (L), no edema noted Arm (R), no edema noted Leg (L), 

no edema noted Leg (R), no edema noted Pedal (L), no edema noted Pedal (R), no 

edema noted Generalized


Neurologic:  motor weakness


Skin:  normal pigmentation, warm/dry











Timo Morejon DO Oct 12, 2019 10:37

## 2019-10-12 NOTE — CARDIOLOGY REPORT
--------------- APPROVED REPORT --------------





EKG Measurement

Heart [id number]

[place] 328P

[id number]

[id number]

[id number]





Abnormal ECG

## 2019-10-12 NOTE — CARDIOLOGY REPORT
--------------- APPROVED REPORT --------------





EKG Measurement

Heart [id number]

[id number]

[id number]

[id number]





Atrial fibrillation

Demand ventricular pacemaker

Abnormal ECG

## 2019-10-12 NOTE — CARDIOLOGY PROGRESS NOTE
Assessment/Plan


Assessment/Plan


prbl will benefit from nebulizers considering that he just quit smoking





Subjective


Subjective


the patient is regin in bed, wife at the bedside, he reports haing mild dyspnea 

with tenacious sputum





Objective





Last 24 Hour Vital Signs








  Date Time  Temp Pulse Resp B/P (MAP) Pulse Ox O2 Delivery O2 Flow Rate FiO2


 


10/12/19 21:35  70  110/53    


 


10/12/19 19:53     96 Nasal Cannula 2.0 28


 


10/12/19 19:52  61 18  96 Nasal Cannula 2.0 28


 


10/12/19 16:10  70 18  98 Nasal Cannula 4.0 36





  68 18  97   


 


10/12/19 16:00 98.2 67 20 121/57 (78) 96   


 


10/12/19 16:00  74      


 


10/12/19 12:00 97.5 68 20 103/49 (67) 97   


 


10/12/19 12:00  74      


 


10/12/19 09:08    129/59    


 


10/12/19 09:08  72      


 


10/12/19 09:08  79  129/59    


 


10/12/19 09:00      Nasal Cannula 2.0 


 


10/12/19 08:00  77      


 


10/12/19 08:00 98.4 72 20 129/59 (82) 97   


 


10/12/19 07:42     98 Nasal Cannula 2.0 28


 


10/12/19 07:42  74 18  98 Nasal Cannula 2.0 28


 


10/12/19 04:00 98.2 62 18 123/60 (81) 97   


 


10/12/19 04:00  72      


 


10/12/19 00:00 98.2 64 18 120/60 (80) 98   


 


10/12/19 00:00  62      








General Appearance:  other - ill apearing


EENT:  PERRL/EOMI


Neck:  JVD


Rhythm:  NSR


Cardiovascular:  systolic murmur


Respiratory/Chest:  rhonchi - bilaterally


Abdomen:  soft


Extremities:  non-tender











Intake and Output  


 


 10/11/19 10/12/19





 19:00 07:00


 


Intake Total  120 ml


 


Output Total  300 ml


 


Balance  -180 ml


 


  


 


Intake Oral  120 ml


 


Output Urine Total  300 ml


 


# Bowel Movements 1 











Laboratory Tests








Test


  10/12/19


06:22 10/12/19


14:15


 


White Blood Count


  8.5 K/UL


(4.8-10.8) 


 


 


Red Blood Count


  3.30 M/UL


(4.70-6.10)  L 


 


 


Hemoglobin


  10.7 G/DL


(14.2-18.0)  L 


 


 


Hematocrit


  32.9 %


(42.0-52.0)  L 


 


 


Mean Corpuscular Volume


  100 FL (80-99)


H 


 


 


Mean Corpuscular Hemoglobin


  32.5 PG


(27.0-31.0)  H 


 


 


Mean Corpuscular Hemoglobin


Concent 32.6 G/DL


(32.0-36.0) 


 


 


Red Cell Distribution Width


  12.6 %


(11.6-14.8) 


 


 


Platelet Count


  212 K/UL


(150-450) 


 


 


Mean Platelet Volume


  6.4 FL


(6.5-10.1)  L 


 


 


Neutrophils (%) (Auto)


  79.3 %


(45.0-75.0)  H 


 


 


Lymphocytes (%) (Auto)


  13.6 %


(20.0-45.0)  L 


 


 


Monocytes (%) (Auto)


  6.5 %


(1.0-10.0) 


 


 


Eosinophils (%) (Auto)


  0.1 %


(0.0-3.0) 


 


 


Basophils (%) (Auto)


  0.4 %


(0.0-2.0) 


 


 


Sodium Level


  144 MMOL/L


(136-145) 


 


 


Potassium Level


  3.8 MMOL/L


(3.5-5.1) 


 


 


Chloride Level


  109 MMOL/L


()  H 


 


 


Carbon Dioxide Level


  26 MMOL/L


(21-32) 


 


 


Anion Gap


  9 mmol/L


(5-15) 


 


 


Blood Urea Nitrogen


  19 mg/dL


(7-18)  H 


 


 


Creatinine


  0.7 MG/DL


(0.55-1.30) 


 


 


Estimat Glomerular Filtration


Rate  mL/min (>60)  


  


 


 


Glucose Level


  75 MG/DL


() 


 


 


Calcium Level


  8.4 MG/DL


(8.5-10.1)  L 


 


 


Troponin I


  0.029 ng/mL


(0.000-0.056) 


 


 


Vancomycin Level Trough


  


  4.5 ug/mL


(5.0-12.0)  L

















Romelia Narvaez MD Oct 12, 2019 22:31

## 2019-10-12 NOTE — CONSULTATION
DATE OF CONSULTATION:  10/11/2019

PSYCHOTHERAPY CONSULTATION PROGRESS NOTE



HISTORY OF PRESENT ILLNESS:  This is an 83-year-old male.  The patient's

wife is also at bedside today when I assessed.  The patient was found to

have shortness of breath, weakness, hypotension.  The patient's wife

states he occasionally has episodes of confusion, irritability, and

disorganization.  For these reasons, he was referred to psychotherapeutic

services.  I assessed this patient today.  The patient's wife was able to

provide an extensive history for the patient.  The patient is very

cooperative and he is able to communicate, articulate his thoughts.  He

states that he does have episodes of confusion.  He becomes very agitated

quickly.  He states that this has never happened to himself before _____.

He denies suicidal or homicidal thoughts of ideation.  Denies any auditory

or visual hallucinations.  The patient _____ also contributing.  At this

time, there is no auditory or visual hallucinations.  No suicidal or

homicidal thoughts of ideation.  The patient states he becomes irritable

when he cannot recall _____.  The patient states that he does not have a

history of mental illness.



PAST MEDICAL HISTORY:  Includes history of weakness, hypertension.



ALLERGIES:  The patient has no known drug allergies.



SUBSTANCE ABUSE HISTORY:  There is no indication of alcohol use, illicit

substance use, or smoking cigarettes.



PSYCHIATRIC HISTORY:  The patient indicates that he does not have a history

of mental illness.



SOCIAL HISTORY:  The patient is a  83-year-old male.  The patient

lives at home with his wife.  Financially sustained through [employer].



MENTAL STATUS EXAMINATION:  The patient is alert and oriented to person,

place, time, and situation.  Mood is dysphoric.  Affect is flat.  Thought

process, disorganized.  Thought content, linear.  The patient has fair

attention and concentration.  Fair insight, judgement, and impulse

control.



DIAGNOSIS:  Rule out major neurocognitive disorder, Alzheimer's type

without behavioral disturbances.



PLAN:  I assessed this patient.  I provided the patient with supportive

psychotherapy, which would provide the patient means to process his

thoughts and feelings of depression and _____ emotions in session,

encouraging the patient to participate in treatment milieu, _____

extensively.



Plan is to maintain medication compliance to assist with positive coping

skills and stabilizing the thoughts and behavior.  Psychotherapy was

provided to this patient, 45 minutes.  This clinician has reviewed the

patient's chart.  Discussed treatment with treatment team.









  ______________________________________________

  Matthew Campos PsyD.





DR:  Santhosh

D:  10/11/2019 13:25

T:  10/12/2019 01:02

JOB#:  [id number]

CC:

## 2019-10-12 NOTE — NUR
HAND-OFF: 

Report given to Wendi/RN, Patient is Lying semi-franks's, resting comfortably. Endorsed 
plan of care.

## 2019-10-12 NOTE — NUR
NURSE NOTES:

Called Dr. Ventura to inform the patient had V-tach for 16 sec, rate 170 at 1534. AICD was 
interrogated on10/10/2019. No new orders at this time. Will continue to monitor and reassess

## 2019-10-12 NOTE — INFECTIOUS DISEASES PROG NOTE
Assessment/Plan


Assessment/Plan








Assessment:


SEvere sepsis 2ry to PNA


    -CT chest:  Consolidation within the left upper lobe.  Bilateral patchy 

groundglass and interstitial infiltrates.  Findings could be related to 

infectious inflammatory infiltrate.  Posttreatment imaging recommended to 

ensure resolution and exclude underlying neoplastic process malignancy.  

Bilateral pleural effusions as well as ascites and subcutaneous 


edema anasarca..  Cardiomegaly.





Transient hypotension


    -CXR:  Left upper lobe consolidation most likely due to pneumonia


    -influenza sc, legionella ag urine neg


    -sp cx p





 Afebrile


Leukocytosis; SP


   -u/a wbc 15-20, nit neg, leuk +1; ucx Neg





Lactic acidosis, mild- resolved





Afib on AC


HTN


HLD


CHF


anemia


BPH 


schizophrenia


CAD/MI s/p CABG


 s/p AICD


 tobacco abuse


 cardiomyopathy EF 25-30%


[facility]


 


Plan:


-Continue empiric IV Vancomycin, Cefepime #4 pending sp cx


-Continue azithromycin #4/5


   -10/9 SP ZOsyn x1





-f/u cx


-Monitor CBC/CMP, temperatures


-f/u sp cx


-aspiration precautions





Thank you for this consultation. Will continue to follow along with you.





Discussed with RN





Subjective


Allergies:  


Coded Allergies:  


     No Known Allergies (Unverified , 10/9/19)


Subjective


afebrile


wbc improving





Objective


Vital Signs





Last 24 Hour Vital Signs








  Date Time  Temp Pulse Resp B/P (MAP) Pulse Ox O2 Delivery O2 Flow Rate FiO2


 


10/12/19 16:10  70 18  98 Nasal Cannula 4.0 36





  68 18  97   


 


10/12/19 16:00 98.2 67 20 121/57 (78) 96   


 


10/12/19 16:00  74      


 


10/12/19 12:00 97.5 68 20 103/49 (67) 97   


 


10/12/19 12:00  74      


 


10/12/19 09:08    129/59    


 


10/12/19 09:08  72      


 


10/12/19 09:08  79  129/59    


 


10/12/19 09:00      Nasal Cannula 2.0 


 


10/12/19 08:00  77      


 


10/12/19 08:00 98.4 72 20 129/59 (82) 97   


 


10/12/19 07:42     98 Nasal Cannula 2.0 28


 


10/12/19 07:42  74 18  98 Nasal Cannula 2.0 28


 


10/12/19 04:00 98.2 62 18 123/60 (81) 97   


 


10/12/19 04:00  72      


 


10/12/19 00:00 98.2 64 18 120/60 (80) 98   


 


10/12/19 00:00  62      


 


10/11/19 21:00  62  99/60    


 


10/11/19 21:00      Nasal Cannula 2.0 


 


10/11/19 20:56     97 Nasal Cannula 3.0 32


 


10/11/19 20:55  77 18  97 Nasal Cannula 3.0 32


 


10/11/19 20:00 98.0 62 18 99/60 (73) 99   


 


10/11/19 20:00  68      








Height (Feet):  5


Height (Inches):  2.00


Weight (Pounds):  110


Objective


GENERAL:  A thin elderly appearing  male, in no acute


distress.


HEENT:  Normocephalic and atraumatic.  Bitemporal wasting.  Pupils are


equal, round, and reactive to light.  Sclerae anicteric.  Oral mucosa are


moist.


NECK:  Supple.  There is no jugular venous distention.


LUNGS:  Left rales.


HEART:  Regular S1, S2.  No murmur or S3.


ABDOMEN:  Soft and nontender.  No palpable mass.


EXTREMITIES:  No cyanosis, clubbing, or edema.





Laboratory Tests








Test


  10/12/19


06:22 10/12/19


14:15


 


White Blood Count


  8.5 K/UL


(4.8-10.8) 


 


 


Red Blood Count


  3.30 M/UL


(4.70-6.10)  L 


 


 


Hemoglobin


  10.7 G/DL


(14.2-18.0)  L 


 


 


Hematocrit


  32.9 %


(42.0-52.0)  L 


 


 


Mean Corpuscular Volume


  100 FL (80-99)


H 


 


 


Mean Corpuscular Hemoglobin


  32.5 PG


(27.0-31.0)  H 


 


 


Mean Corpuscular Hemoglobin


Concent 32.6 G/DL


(32.0-36.0) 


 


 


Red Cell Distribution Width


  12.6 %


(11.6-14.8) 


 


 


Platelet Count


  212 K/UL


(150-450) 


 


 


Mean Platelet Volume


  6.4 FL


(6.5-10.1)  L 


 


 


Neutrophils (%) (Auto)


  79.3 %


(45.0-75.0)  H 


 


 


Lymphocytes (%) (Auto)


  13.6 %


(20.0-45.0)  L 


 


 


Monocytes (%) (Auto)


  6.5 %


(1.0-10.0) 


 


 


Eosinophils (%) (Auto)


  0.1 %


(0.0-3.0) 


 


 


Basophils (%) (Auto)


  0.4 %


(0.0-2.0) 


 


 


Sodium Level


  144 MMOL/L


(136-145) 


 


 


Potassium Level


  3.8 MMOL/L


(3.5-5.1) 


 


 


Chloride Level


  109 MMOL/L


()  H 


 


 


Carbon Dioxide Level


  26 MMOL/L


(21-32) 


 


 


Anion Gap


  9 mmol/L


(5-15) 


 


 


Blood Urea Nitrogen


  19 mg/dL


(7-18)  H 


 


 


Creatinine


  0.7 MG/DL


(0.55-1.30) 


 


 


Estimat Glomerular Filtration


Rate  mL/min (>60)  


  


 


 


Glucose Level


  75 MG/DL


() 


 


 


Calcium Level


  8.4 MG/DL


(8.5-10.1)  L 


 


 


Troponin I


  0.029 ng/mL


(0.000-0.056) 


 


 


Vancomycin Level Trough


  


  4.5 ug/mL


(5.0-12.0)  L











Current Medications








 Medications


  (Trade)  Dose


 Ordered  Sig/Colleen


 Route


 PRN Reason  Start Time


 Stop Time Status Last Admin


Dose Admin


 


 Acetaminophen


  (Tylenol)  650 mg  Q4H  PRN


 ORAL


 FEVER  10/9/19 12:45


 11/8/19 12:44   


 


 


 Albuterol/


 Ipratropium


  (Albuterol/


 Ipratropium)  3 ml  Q4H  PRN


 HHN


 Shortness of Breath  10/12/19 12:45


 10/17/19 12:44  10/12/19 16:10


 


 


 Atorvastatin


 Calcium


  (Lipitor)  40 mg  BEDTIME


 ORAL


   10/10/19 21:00


 11/9/19 20:59  10/11/19 21:35


 


 


 Azithromycin


  (Zithromax)  500 mg  DAILY


 ORAL


   10/10/19 09:00


 10/17/19 08:59  10/12/19 09:08


 


 


 Carvedilol


  (Coreg)  6.25 mg  EVERY 12  HOURS


 ORAL


   10/10/19 21:00


 11/9/19 20:59  10/12/19 09:08


 


 


 Cefepime HCl 2 gm/


 Dextrose  110 ml @ 


 220 mls/hr  DAILY


 IV


   10/10/19 09:00


 10/17/19 08:59  10/12/19 09:09


 


 


 Dextrose


  (Dextrose 50%)  25 ml  Q30M  PRN


 IV


 Hypoglycemia  10/9/19 12:45


 11/8/19 12:44   


 


 


 Dextrose


  (Dextrose 50%)  50 ml  Q30M  PRN


 IV


 Hypoglycemia  10/9/19 12:45


 11/8/19 12:44   


 


 


 Digoxin


  (Lanoxin)  0.125 mg  DAILY


 ORAL


   10/11/19 09:00


 11/10/19 08:59  10/12/19 09:08


 


 


 Donepezil HCl


  (Aricept)  5 mg  QHS


 ORAL


   10/12/19 21:00


 11/11/19 20:59   


 


 


 Escitalopram


 Oxalate


  (Lexapro)  5 mg  DAILY


 ORAL


   10/13/19 09:00


 11/12/19 08:59   


 


 


 Lisinopril


  (Zestril)  10 mg  DAILY


 ORAL


   10/12/19 09:00


 11/11/19 08:59  10/12/19 09:08


 


 


 Lorazepam


  (Ativan 2mg/ml


 1ml)  2 mg  Q2H  PRN


 IV


 For Anxiety  10/9/19 12:45


 10/16/19 12:44   


 


 


 Polyethylene


 Glycol


  (Miralax)  17 gm  DAILYPRN  PRN


 ORAL


 Constipation  10/9/19 12:45


 11/8/19 12:44   


 


 


 Promethazine HCl/


 Codeine


  (Phenergan with


 Codeine)  5 ml  Q4H  PRN


 ORAL


 For Cough  10/9/19 12:45


 11/8/19 12:44   


 


 


 Rivaroxaban


  (Xarelto)  15 mg  DAILY


 ORAL


   10/10/19 09:00


 11/9/19 08:59  10/12/19 09:08


 


 


 Tamsulosin HCl


  (Flomax)  0.4 mg  QHS


 ORAL


   10/9/19 21:00


 11/8/19 20:59  10/11/19 21:36


 


 


 Trazodone HCl


  (Desyrel)  25 mg  BEDTIME


 ORAL


   10/9/19 21:00


 11/8/19 20:59  10/11/19 21:36


 


 


 Vancomycin HCl


  (Vanco rx to


 dose)  1 ea  DAILY  PRN


 MISC


 Per rx protocol  10/9/19 13:45


 11/8/19 13:44   


 


 


 Vancomycin HCl


 500 mg/Dextrose  110 ml @ 


 110 mls/hr  Q12H


 IVPB


   10/13/19 03:00


 10/18/19 02:59   


 

















Opal Alamo M.D. Oct 12, 2019 19:40

## 2019-10-13 VITALS — SYSTOLIC BLOOD PRESSURE: 103 MMHG | DIASTOLIC BLOOD PRESSURE: 56 MMHG

## 2019-10-13 VITALS — SYSTOLIC BLOOD PRESSURE: 119 MMHG | DIASTOLIC BLOOD PRESSURE: 60 MMHG

## 2019-10-13 VITALS — DIASTOLIC BLOOD PRESSURE: 59 MMHG | SYSTOLIC BLOOD PRESSURE: 123 MMHG

## 2019-10-13 VITALS — SYSTOLIC BLOOD PRESSURE: 112 MMHG | DIASTOLIC BLOOD PRESSURE: 57 MMHG

## 2019-10-13 VITALS — SYSTOLIC BLOOD PRESSURE: 116 MMHG | DIASTOLIC BLOOD PRESSURE: 64 MMHG

## 2019-10-13 VITALS — DIASTOLIC BLOOD PRESSURE: 58 MMHG | SYSTOLIC BLOOD PRESSURE: 110 MMHG

## 2019-10-13 LAB
ADD MANUAL DIFF: NO
ANION GAP SERPL CALC-SCNC: 5 MMOL/L (ref 5–15)
BASOPHILS NFR BLD AUTO: 0.5 % (ref 0–2)
BUN SERPL-MCNC: 15 MG/DL (ref 7–18)
CALCIUM SERPL-MCNC: 8.8 MG/DL (ref 8.5–10.1)
CHLORIDE SERPL-SCNC: 105 MMOL/L (ref 98–107)
CO2 SERPL-SCNC: 32 MMOL/L (ref 21–32)
CREAT SERPL-MCNC: 0.8 MG/DL (ref 0.55–1.3)
EOSINOPHIL NFR BLD AUTO: 0.5 % (ref 0–3)
ERYTHROCYTE [DISTWIDTH] IN BLOOD BY AUTOMATED COUNT: 12.4 % (ref 11.6–14.8)
HCT VFR BLD CALC: 34.9 % (ref 42–52)
HGB BLD-MCNC: 11.2 G/DL (ref 14.2–18)
LYMPHOCYTES NFR BLD AUTO: 23.9 % (ref 20–45)
MCV RBC AUTO: 101 FL (ref 80–99)
MONOCYTES NFR BLD AUTO: 7.7 % (ref 1–10)
NEUTROPHILS NFR BLD AUTO: 67.4 % (ref 45–75)
PLATELET # BLD: 282 K/UL (ref 150–450)
POTASSIUM SERPL-SCNC: 3.5 MMOL/L (ref 3.5–5.1)
RBC # BLD AUTO: 3.47 M/UL (ref 4.7–6.1)
SODIUM SERPL-SCNC: 142 MMOL/L (ref 136–145)
WBC # BLD AUTO: 7.6 K/UL (ref 4.8–10.8)

## 2019-10-13 RX ADMIN — VANCOMYCIN HYDROCHLORIDE SCH MLS/HR: 500 INJECTION, POWDER, LYOPHILIZED, FOR SOLUTION INTRAVENOUS at 03:38

## 2019-10-13 RX ADMIN — ATORVASTATIN CALCIUM SCH MG: 20 TABLET, FILM COATED ORAL at 21:30

## 2019-10-13 RX ADMIN — LISINOPRIL SCH MG: 10 TABLET ORAL at 08:48

## 2019-10-13 RX ADMIN — VANCOMYCIN HYDROCHLORIDE SCH MLS/HR: 500 INJECTION, POWDER, LYOPHILIZED, FOR SOLUTION INTRAVENOUS at 15:30

## 2019-10-13 RX ADMIN — TAMSULOSIN HYDROCHLORIDE SCH MG: 0.4 CAPSULE ORAL at 21:31

## 2019-10-13 RX ADMIN — DONEPEZIL HYDROCHLORIDE SCH MG: 5 TABLET, FILM COATED ORAL at 21:30

## 2019-10-13 RX ADMIN — RIVAROXABAN SCH MG: 15 TABLET, FILM COATED ORAL at 08:50

## 2019-10-13 RX ADMIN — AZITHROMYCIN DIHYDRATE SCH MG: 250 TABLET, FILM COATED ORAL at 08:49

## 2019-10-13 RX ADMIN — CARVEDILOL SCH MG: 6.25 TABLET, FILM COATED ORAL at 21:31

## 2019-10-13 RX ADMIN — DIGOXIN SCH MG: 0.12 TABLET ORAL at 08:48

## 2019-10-13 RX ADMIN — CARVEDILOL SCH MG: 6.25 TABLET, FILM COATED ORAL at 08:54

## 2019-10-13 RX ADMIN — TRAZODONE HYDROCHLORIDE SCH MG: 50 TABLET ORAL at 21:30

## 2019-10-13 RX ADMIN — SODIUM CHLORIDE SCH MLS/HR: 0.9 INJECTION INTRAVENOUS at 08:50

## 2019-10-13 NOTE — PULMONOLOGY PROGRESS NOTE
Assessment/Plan


Assessment/Plan


ASSESSMENT


severe sepsis 2 to PNA


PNA


chronic systolic CHF


ischemic CM with EF 25-30%


A fib/ on a/c


AICD


CAD with hx of CABG


COPD,


HLD


Anemia


BPH


Schizophrenia


Tobacco abuse, recently  quit 





PLAN OF CARE


tele


O2 ; HHN; CPT


CT chest noted with  consolidation within the left upper lobe.  Bilateral 

patchy 


ground-glass and interstitial infiltrates.  Findings could be related to  

infectious inflammatory infiltrate.   


abx - per ID


SCX if able 


BCX negative, UCX negative


fup with CXR in am





CT chest to be repeated as outpatient to ensure resolution of infection 


ECHO with EF 25-30%


CHF management with  guideline directed medical therapy: BB, ACE/low dose, no 

need for diuretics at present


a/c with Xarelto ; rate control/BB, Digoxin


continue statin


monitor HH with goal to keep Hgb > 7


cont Flomax 


declined Nicotine patch





inhalers on dc 


DNR/DNI status 


transfer to [facility] if K with cardio 


dc plan 





case discussed and evaluated by supervising physician





Subjective


Allergies:  


Coded Allergies:  


     No Known Allergies (Unverified , 10/9/19)


Subjective


leuk resolved, afebrile


feeling better  


pulse ox stable on RA





Objective





Last 24 Hour Vital Signs








  Date Time  Temp Pulse Resp B/P (MAP) Pulse Ox O2 Delivery O2 Flow Rate FiO2


 


10/13/19 08:00 97.9 86 19 112/57 (75) 97   


 


10/13/19 07:53     97 Nasal Cannula 2.0 28


 


10/13/19 07:53  70 16  97 Nasal Cannula 2.0 28


 


10/13/19 04:46  63      


 


10/13/19 04:00 98.0 70 16 116/64 (81) 97   


 


10/13/19 00:00 98.5 65 16 119/60 (79) 98   


 


10/13/19 00:00  64      


 


10/12/19 21:35  70  110/53    


 


10/12/19 21:00      Nasal Cannula 2.0 


 


10/12/19 20:00  63      


 


10/12/19 20:00 97.9 70 16 110/53 (72) 98   


 


10/12/19 19:53     96 Nasal Cannula 2.0 28


 


10/12/19 19:52  61 18  96 Nasal Cannula 2.0 28


 


10/12/19 16:10  70 18  98 Nasal Cannula 4.0 36





  68 18  97   


 


10/12/19 16:00 98.2 67 20 121/57 (78) 96   


 


10/12/19 16:00  74      


 


10/12/19 12:00 97.5 68 20 103/49 (67) 97   


 


10/12/19 12:00  74      


 


10/12/19 09:08    129/59    


 


10/12/19 09:08  72      


 


10/12/19 09:08  79  129/59    


 


10/12/19 09:00      Nasal Cannula 2.0 

















Intake and Output  


 


 10/12/19 10/13/19





 19:00 07:00


 


Intake Total 720 ml 


 


Output Total 425 ml 100 ml


 


Balance 295 ml -100 ml


 


  


 


Intake Oral 720 ml 


 


Output Urine Total 425 ml 100 ml


 


# Bowel Movements 1 1








Objective


General Appearance:  no acute distress


HEENT:  normocephalic, atraumatic, anicteric, mucous membranes moist rhonchi - 

isolated rhonchi , few bibasilar crackles


Abdomen:  normal bowel sounds, soft, non tender, non distended


Extremities:  no edema


Neurologic/Psychiatric:  alert, responsive


Musculoskeletal:  atrophy - BLE


Laboratory Tests


10/12/19 14:15: Vancomycin Level Trough 4.5L





Current Medications








 Medications


  (Trade)  Dose


 Ordered  Sig/Colleen


 Route


 PRN Reason  Start Time


 Stop Time Status Last Admin


Dose Admin


 


 Acetaminophen


  (Tylenol)  650 mg  Q4H  PRN


 ORAL


 FEVER  10/9/19 12:45


 11/8/19 12:44   


 


 


 Albuterol/


 Ipratropium


  (Albuterol/


 Ipratropium)  3 ml  Q4H  PRN


 HHN


 Shortness of Breath  10/12/19 12:45


 10/17/19 12:44  10/12/19 16:10


 


 


 Atorvastatin


 Calcium


  (Lipitor)  40 mg  BEDTIME


 ORAL


   10/10/19 21:00


 11/9/19 20:59  10/12/19 21:33


 


 


 Azithromycin


  (Zithromax)  500 mg  DAILY


 ORAL


   10/10/19 09:00


 10/17/19 08:59  10/12/19 09:08


 


 


 Carvedilol


  (Coreg)  6.25 mg  EVERY 12  HOURS


 ORAL


   10/10/19 21:00


 11/9/19 20:59  10/12/19 21:35


 


 


 Cefepime HCl 2 gm/


 Dextrose  110 ml @ 


 220 mls/hr  DAILY


 IV


   10/10/19 09:00


 10/17/19 08:59  10/12/19 09:09


 


 


 Dextrose


  (Dextrose 50%)  25 ml  Q30M  PRN


 IV


 Hypoglycemia  10/9/19 12:45


 11/8/19 12:44   


 


 


 Dextrose


  (Dextrose 50%)  50 ml  Q30M  PRN


 IV


 Hypoglycemia  10/9/19 12:45


 11/8/19 12:44   


 


 


 Digoxin


  (Lanoxin)  0.125 mg  DAILY


 ORAL


   10/11/19 09:00


 11/10/19 08:59  10/12/19 09:08


 


 


 Donepezil HCl


  (Aricept)  5 mg  QHS


 ORAL


   10/12/19 21:00


 11/11/19 20:59  10/12/19 21:34


 


 


 Escitalopram


 Oxalate


  (Lexapro)  5 mg  DAILY


 ORAL


   10/13/19 09:00


 11/12/19 08:59   


 


 


 Lisinopril


  (Zestril)  10 mg  DAILY


 ORAL


   10/12/19 09:00


 11/11/19 08:59  10/12/19 09:08


 


 


 Lorazepam


  (Ativan 2mg/ml


 1ml)  2 mg  Q2H  PRN


 IV


 For Anxiety  10/9/19 12:45


 10/16/19 12:44   


 


 


 Polyethylene


 Glycol


  (Miralax)  17 gm  DAILYPRN  PRN


 ORAL


 Constipation  10/9/19 12:45


 11/8/19 12:44   


 


 


 Promethazine HCl/


 Codeine


  (Phenergan with


 Codeine)  5 ml  Q4H  PRN


 ORAL


 For Cough  10/9/19 12:45


 11/8/19 12:44   


 


 


 Rivaroxaban


  (Xarelto)  15 mg  DAILY


 ORAL


   10/10/19 09:00


 11/9/19 08:59  10/12/19 09:08


 


 


 Tamsulosin HCl


  (Flomax)  0.4 mg  QHS


 ORAL


   10/9/19 21:00


 11/8/19 20:59  10/12/19 21:33


 


 


 Trazodone HCl


  (Desyrel)  25 mg  BEDTIME


 ORAL


   10/9/19 21:00


 11/8/19 20:59  10/12/19 21:34


 


 


 Vancomycin HCl


  (Vanco rx to


 dose)  1 ea  DAILY  PRN


 MISC


 Per rx protocol  10/9/19 13:45


 11/8/19 13:44   


 


 


 Vancomycin HCl


 500 mg/Dextrose  110 ml @ 


 110 mls/hr  Q12H


 IVPB


   10/13/19 03:00


 10/18/19 02:59  10/13/19 03:38


 

















Ava Lee NP Oct 13, 2019 08:47

## 2019-10-13 NOTE — CARDIOLOGY PROGRESS NOTE
Assessment/Plan


Assessment/Plan


prbl will benefit from nebulizers considering that he just quit smoking





Subjective


Subjective


the patient is resting in bed, wife at the bedside, he reports having mild 

dyspnea with tenacious sputum, was out of bed for a short period of time mild 

dizziness, his dyspnea is better.





Objective





Last 24 Hour Vital Signs








  Date Time  Temp Pulse Resp B/P (MAP) Pulse Ox O2 Delivery O2 Flow Rate FiO2


 


10/13/19 16:00 99.1 66 20 103/56 (72) 95   


 


10/13/19 16:00  64      


 


10/13/19 12:00  64      


 


10/13/19 12:00 97.9 72 18 123/59 (80) 97   


 


10/13/19 09:00      Nasal Cannula 2.0 


 


10/13/19 08:54  86  112/57    


 


10/13/19 08:48    112/57    


 


10/13/19 08:48  86      


 


10/13/19 08:00 97.9 86 19 112/57 (75) 97   


 


10/13/19 08:00  84      


 


10/13/19 07:53     97 Nasal Cannula 2.0 28


 


10/13/19 07:53  70 16  97 Nasal Cannula 2.0 28


 


10/13/19 04:46  63      


 


10/13/19 04:00 98.0 70 16 116/64 (81) 97   


 


10/13/19 00:00 98.5 65 16 119/60 (79) 98   


 


10/13/19 00:00  64      


 


10/12/19 21:35  70  110/53    


 


10/12/19 21:00      Nasal Cannula 2.0 


 


10/12/19 20:00  63      


 


10/12/19 20:00 97.9 70 16 110/53 (72) 98   


 


10/12/19 19:53     96 Nasal Cannula 2.0 28


 


10/12/19 19:52  61 18  96 Nasal Cannula 2.0 28








General Appearance:  thin, other - ill appearing, mild dyspnea


EENT:  PERRL/EOMI


Neck:  JVD


Cardiovascular:  regular rhythm


Respiratory/Chest:  crackles/rales


Abdomen:  soft


Extremities:  normal range of motion











Intake and Output  


 


 10/12/19 10/13/19





 18:59 06:59


 


Intake Total 720 ml 


 


Output Total 425 ml 100 ml


 


Balance 295 ml -100 ml


 


  


 


Intake Oral 720 ml 


 


Output Urine Total 425 ml 100 ml


 


# Bowel Movements 1 1











Laboratory Tests








Test


  10/13/19


09:50


 


White Blood Count


  7.6 K/UL


(4.8-10.8)


 


Red Blood Count


  3.47 M/UL


(4.70-6.10)  L


 


Hemoglobin


  11.2 G/DL


(14.2-18.0)  L


 


Hematocrit


  34.9 %


(42.0-52.0)  L


 


Mean Corpuscular Volume


  101 FL (80-99)


H


 


Mean Corpuscular Hemoglobin


  32.2 PG


(27.0-31.0)  H


 


Mean Corpuscular Hemoglobin


Concent 32.0 G/DL


(32.0-36.0)


 


Red Cell Distribution Width


  12.4 %


(11.6-14.8)


 


Platelet Count


  282 K/UL


(150-450)


 


Mean Platelet Volume


  6.1 FL


(6.5-10.1)  L


 


Neutrophils (%) (Auto)


  67.4 %


(45.0-75.0)


 


Lymphocytes (%) (Auto)


  23.9 %


(20.0-45.0)


 


Monocytes (%) (Auto)


  7.7 %


(1.0-10.0)


 


Eosinophils (%) (Auto)


  0.5 %


(0.0-3.0)


 


Basophils (%) (Auto)


  0.5 %


(0.0-2.0)


 


Sodium Level


  142 MMOL/L


(136-145)


 


Potassium Level


  3.5 MMOL/L


(3.5-5.1)


 


Chloride Level


  105 MMOL/L


()


 


Carbon Dioxide Level


  32 MMOL/L


(21-32)


 


Anion Gap


  5 mmol/L


(5-15)


 


Blood Urea Nitrogen


  15 mg/dL


(7-18)


 


Creatinine


  0.8 MG/DL


(0.55-1.30)


 


Estimat Glomerular Filtration


Rate  mL/min (>60)  


 


 


Glucose Level


  107 MG/DL


()  H


 


Calcium Level


  8.8 MG/DL


(8.5-10.1)








Objective


improving respiratory status, orthopnea is better, his sputum is less tenacious











Romelia Narvaez MD Oct 13, 2019 19:40

## 2019-10-13 NOTE — NUR
NURSE NOTES:

Received patient from RAHEEL Kwon, patient in stable condition, AOx4, denies pain at this time, 
Iv site on right FA, g22, bed low&locked . side rails up x3, call light within reach will 
continue to monitor and reassess

## 2019-10-13 NOTE — PROGRESS NOTE
DATE:  10/13/2019

SUBJECTIVE:  This is an 83-year-old male patient who is confused,

disorganized.  He has got no logical plan for his own self-care.  He has

got feelings of helplessness, hopelessness, low energy, poor appetite, and

loss of interest in activity that is why he does require acute psychiatric

inpatient treatment at this time.  The patient continues to endorse that

he still has some slight irritability, confusion, and also has shortness

of breath, weakness, and hypoxia but this patient continues to have some

shortness of breath and weakness but he has increased depression, altered

mental status worsened by stress of medical illness.



MENTAL STATUS EXAMINATION:  This is an 83-year-old male.  Appearance is

disheveled.  Attitude, irritable and agitated.  Affect, guarded and

restricted.  Intellect poor.  Mood, depressed and anxious.  Motor

activity, psychomotor agitation.  Attention span is poor.  Orientation x2.

Speech is pressured.  Thought process, disorganized and illogical.

Insight and judgement are.



DIAGNOSIS:  Major depressive disorder, mild, recurrent with psychotic

features rule out dementia with psychosis.



PLAN:  Treat him with Aricept 5 mg at bedtime, Lexapro 10 mg daily, Ativan

2 mg as needed, trazodone 50 mg at bedtime.  A  20 minutes of cognitive

behavioral therapy to help him identify his automatic negative thoughts

and help convert his negative thoughts to more positive thoughts to reduce

depression, anxiety, and mood lability.   I encouraged him to interact

appropriately staff and other patients.  Chart reviewed.  Discussed with

staff.  Seen and assessed at bedside.









  ______________________________________________

  Vero Andrea M.D.





DR:  Prosper

D:  10/13/2019 09:19

T:  10/13/2019 15:26

JOB#:  [id number]

CC:

## 2019-10-13 NOTE — GENERAL PROGRESS NOTE
Assessment/Plan


Problem List:  


(1) UTI (urinary tract infection)


ICD Codes:  N39.0 - Urinary tract infection, site not specified


SNOMED:  46123875


Qualifiers:  


   Qualified Codes:  N39.0 - Urinary tract infection, site not specified


(2) Sepsis


ICD Codes:  A41.9 - Sepsis, unspecified organism


SNOMED:  17023707


Qualifiers:  


   Qualified Codes:  A41.9 - Sepsis, unspecified organism


(3) Pneumonia


ICD Codes:  J18.9 - Pneumonia, unspecified organism


SNOMED:  480936550


Qualifiers:  


   Qualified Codes:  J18.1 - Lobar pneumonia, unspecified organism


(4) Hypotension


ICD Codes:  I95.9 - Hypotension, unspecified


SNOMED:  86885616


(5) CHF (congestive heart failure)


ICD Codes:  I50.9 - Heart failure, unspecified


SNOMED:  84133838


(6) HTN (hypertension)


ICD Codes:  I10 - Essential (primary) hypertension


SNOMED:  55735667


Status:  stable, progressing, unchanged


Assessment/Plan:


o2 pulm tx abx pt diet cbc bmp am aru eval





Subjective


Constitutional:  Reports: weakness


Respiratory:  Reports: shortness of breath


Allergies:  


Coded Allergies:  


     No Known Allergies (Unverified , 10/9/19)


All Systems:  reviewed and negative except above


Subjective


o2nc calm in bed





Objective





Last 24 Hour Vital Signs








  Date Time  Temp Pulse Resp B/P (MAP) Pulse Ox O2 Delivery O2 Flow Rate FiO2


 


10/13/19 08:00 97.9 86 19 112/57 (75) 97   


 


10/13/19 07:53     97 Nasal Cannula 2.0 28


 


10/13/19 07:53  70 16  97 Nasal Cannula 2.0 28


 


10/13/19 04:46  63      


 


10/13/19 04:00 98.0 70 16 116/64 (81) 97   


 


10/13/19 00:00 98.5 65 16 119/60 (79) 98   


 


10/13/19 00:00  64      


 


10/12/19 21:35  70  110/53    


 


10/12/19 21:00      Nasal Cannula 2.0 


 


10/12/19 20:00  63      


 


10/12/19 20:00 97.9 70 16 110/53 (72) 98   


 


10/12/19 19:53     96 Nasal Cannula 2.0 28


 


10/12/19 19:52  61 18  96 Nasal Cannula 2.0 28


 


10/12/19 16:10  70 18  98 Nasal Cannula 4.0 36





  68 18  97   


 


10/12/19 16:00 98.2 67 20 121/57 (78) 96   


 


10/12/19 16:00  74      


 


10/12/19 12:00 97.5 68 20 103/49 (67) 97   


 


10/12/19 12:00  74      


 


10/12/19 09:08    129/59    


 


10/12/19 09:08  72      


 


10/12/19 09:08  79  129/59    


 


10/12/19 09:00      Nasal Cannula 2.0 

















Intake and Output  


 


 10/12/19 10/13/19





 19:00 07:00


 


Intake Total 720 ml 


 


Output Total 425 ml 100 ml


 


Balance 295 ml -100 ml


 


  


 


Intake Oral 720 ml 


 


Output Urine Total 425 ml 100 ml


 


# Bowel Movements 1 1








Laboratory Tests


10/12/19 14:15: Vancomycin Level Trough 4.5L


Height (Feet):  5


Height (Inches):  2.00


Weight (Pounds):  110


General Appearance:  lethargic


EENT:  normal ENT inspection


Neck:  normal alignment


Cardiovascular:  normal peripheral pulses, normal rate, regular rhythm


Respiratory/Chest:  chest wall non-tender, lungs clear, normal breath sounds


Extremities:  normal inspection


Edema:  no edema noted Arm (L), no edema noted Arm (R), no edema noted Leg (L), 

no edema noted Leg (R), no edema noted Pedal (L), no edema noted Pedal (R), no 

edema noted Generalized


Neurologic:  responsive, motor weakness


Skin:  normal pigmentation, warm/dry











Timo Morejon DO Oct 13, 2019 08:37

## 2019-10-13 NOTE — NUR
NURSE NOTES:

Received report from Wendi/RN, Patient is awake, sitting up on bed, eating breakfast. On 
2L nasal canula, No acute distress/SOB noted. AAO x4, Able to make needs known. IV site 
patent, no bleeding or infiltration noted. Condom cath draining well to gravity. Bed in low 
position and locked. Call light within reach, Will continue plan of care.

## 2019-10-14 VITALS — DIASTOLIC BLOOD PRESSURE: 49 MMHG | SYSTOLIC BLOOD PRESSURE: 112 MMHG

## 2019-10-14 VITALS — SYSTOLIC BLOOD PRESSURE: 113 MMHG | DIASTOLIC BLOOD PRESSURE: 58 MMHG

## 2019-10-14 VITALS — SYSTOLIC BLOOD PRESSURE: 110 MMHG | DIASTOLIC BLOOD PRESSURE: 58 MMHG

## 2019-10-14 VITALS — DIASTOLIC BLOOD PRESSURE: 58 MMHG | SYSTOLIC BLOOD PRESSURE: 123 MMHG

## 2019-10-14 VITALS — SYSTOLIC BLOOD PRESSURE: 116 MMHG | DIASTOLIC BLOOD PRESSURE: 44 MMHG

## 2019-10-14 VITALS — DIASTOLIC BLOOD PRESSURE: 55 MMHG | SYSTOLIC BLOOD PRESSURE: 117 MMHG

## 2019-10-14 LAB
ADD MANUAL DIFF: NO
ANION GAP SERPL CALC-SCNC: 2 MMOL/L (ref 5–15)
BASOPHILS NFR BLD AUTO: 0.7 % (ref 0–2)
BUN SERPL-MCNC: 13 MG/DL (ref 7–18)
CALCIUM SERPL-MCNC: 8.5 MG/DL (ref 8.5–10.1)
CHLORIDE SERPL-SCNC: 104 MMOL/L (ref 98–107)
CO2 SERPL-SCNC: 34 MMOL/L (ref 21–32)
CREAT SERPL-MCNC: 0.7 MG/DL (ref 0.55–1.3)
EOSINOPHIL NFR BLD AUTO: 1.5 % (ref 0–3)
ERYTHROCYTE [DISTWIDTH] IN BLOOD BY AUTOMATED COUNT: 12.4 % (ref 11.6–14.8)
HCT VFR BLD CALC: 32.1 % (ref 42–52)
HGB BLD-MCNC: 10.5 G/DL (ref 14.2–18)
LYMPHOCYTES NFR BLD AUTO: 29.9 % (ref 20–45)
MCV RBC AUTO: 99 FL (ref 80–99)
MONOCYTES NFR BLD AUTO: 9.6 % (ref 1–10)
NEUTROPHILS NFR BLD AUTO: 58.3 % (ref 45–75)
PLATELET # BLD: 276 K/UL (ref 150–450)
POTASSIUM SERPL-SCNC: 3.9 MMOL/L (ref 3.5–5.1)
RBC # BLD AUTO: 3.23 M/UL (ref 4.7–6.1)
SODIUM SERPL-SCNC: 140 MMOL/L (ref 136–145)
WBC # BLD AUTO: 5.7 K/UL (ref 4.8–10.8)

## 2019-10-14 RX ADMIN — CARVEDILOL SCH MG: 6.25 TABLET, FILM COATED ORAL at 20:40

## 2019-10-14 RX ADMIN — DIGOXIN SCH MG: 0.12 TABLET ORAL at 08:54

## 2019-10-14 RX ADMIN — TAMSULOSIN HYDROCHLORIDE SCH MG: 0.4 CAPSULE ORAL at 20:40

## 2019-10-14 RX ADMIN — ATORVASTATIN CALCIUM SCH MG: 20 TABLET, FILM COATED ORAL at 20:39

## 2019-10-14 RX ADMIN — CARVEDILOL SCH MG: 6.25 TABLET, FILM COATED ORAL at 08:54

## 2019-10-14 RX ADMIN — VANCOMYCIN HYDROCHLORIDE SCH MLS/HR: 500 INJECTION, POWDER, LYOPHILIZED, FOR SOLUTION INTRAVENOUS at 03:23

## 2019-10-14 RX ADMIN — AZITHROMYCIN DIHYDRATE SCH MG: 250 TABLET, FILM COATED ORAL at 08:51

## 2019-10-14 RX ADMIN — LISINOPRIL SCH MG: 10 TABLET ORAL at 08:52

## 2019-10-14 RX ADMIN — SODIUM CHLORIDE SCH MLS/HR: 0.9 INJECTION INTRAVENOUS at 08:55

## 2019-10-14 RX ADMIN — VANCOMYCIN HYDROCHLORIDE SCH MLS/HR: 500 INJECTION, POWDER, LYOPHILIZED, FOR SOLUTION INTRAVENOUS at 15:13

## 2019-10-14 RX ADMIN — RIVAROXABAN SCH MG: 15 TABLET, FILM COATED ORAL at 08:52

## 2019-10-14 RX ADMIN — DONEPEZIL HYDROCHLORIDE SCH MG: 5 TABLET, FILM COATED ORAL at 20:40

## 2019-10-14 RX ADMIN — TRAZODONE HYDROCHLORIDE SCH MG: 50 TABLET ORAL at 20:40

## 2019-10-14 NOTE — INFECTIOUS DISEASES PROG NOTE
Assessment/Plan


Assessment/Plan








Assessment:


SEvere sepsis 2ry to PNA


   -10/14 CXR: Suspect slight worsening of left upper lobe infiltrate and some 

associated volume loss. The latter may in part be artifactual due to patient 

rotation, however.


    -CT chest:  Consolidation within the left upper lobe.  Bilateral patchy 

groundglass and interstitial infiltrates.  Findings could be related to 

infectious inflammatory infiltrate.  Posttreatment imaging recommended to 

ensure resolution and exclude underlying neoplastic process malignancy.  

Bilateral pleural effusions as well as ascites and subcutaneous 


edema anasarca..  Cardiomegaly.





Transient hypotension


    -CXR:  Left upper lobe consolidation most likely due to pneumonia


    -influenza sc, legionella ag urine neg


    -sp cx p





 Afebrile


Leukocytosis; SP


   -u/a wbc 15-20, nit neg, leuk +1; ucx Neg





Lactic acidosis, mild- resolved





Afib on AC


HTN


HLD


CHF


anemia


BPH 


schizophrenia


CAD/MI s/p CABG


 s/p AICD


 tobacco abuse


 cardiomyopathy EF 25-30%


[facility]


 


Plan:


-Continue empiric IV Vancomycin, Cefepime #6 pending sp cx


-D/c azithromycin #6/5


   -10/9 SP ZOsyn x1





-f/u cx


-Monitor CBC/CMP, temperatures


-f/u sp cx (still pending)


-aspiration precautions





Thank you for this consultation. Will continue to follow along with you.





Discussed with RN





Subjective


Allergies:  


Coded Allergies:  


     No Known Allergies (Unverified , 10/9/19)


Subjective


afebrile


leukocytosis resolved





Objective


Vital Signs





Last 24 Hour Vital Signs








  Date Time  Temp Pulse Resp B/P (MAP) Pulse Ox O2 Delivery O2 Flow Rate FiO2


 


10/14/19 12:00 98.3 65 20 110/58 (75) 95   


 


10/14/19 12:00  72      


 


10/14/19 09:00      Nasal Cannula 2.0 


 


10/14/19 08:54  84      


 


10/14/19 08:54  84  117/55    


 


10/14/19 08:52    117/55    


 


10/14/19 08:00 98.9 66 18 117/55 (75) 99   


 


10/14/19 08:00  74      


 


10/14/19 04:00 97.4 57 17 123/58 (79) 96   


 


10/14/19 04:00  65      


 


10/14/19 00:00  60      


 


10/14/19 00:00 97.6 60 16 113/58 (76) 99   


 


10/13/19 21:31  90  110/58    


 


10/13/19 21:00      Nasal Cannula 2.0 


 


10/13/19 20:22  72 16  96 Nasal Cannula 2.0 28


 


10/13/19 20:22     96 Nasal Cannula 2.0 28


 


10/13/19 20:00 97.6 90 18 110/58 (75) 97   


 


10/13/19 20:00  64      


 


10/13/19 16:00 99.1 66 20 103/56 (72) 95   


 


10/13/19 16:00  64      








Height (Feet):  5


Height (Inches):  2.00


Weight (Pounds):  110


Objective


GENERAL:  A thin elderly appearing  male, in no acute


distress.


HEENT:  Normocephalic and atraumatic.  Bitemporal wasting.  Pupils are


equal, round, and reactive to light.  Sclerae anicteric.  Oral mucosa are


moist.


NECK:  Supple.  There is no jugular venous distention.


LUNGS:  Left rales.


HEART:  Regular S1, S2.  No murmur or S3.


ABDOMEN:  Soft and nontender.  No palpable mass.


EXTREMITIES:  No cyanosis, clubbing, or edema.





Laboratory Tests








Test


  10/14/19


06:35


 


White Blood Count


  5.7 K/UL


(4.8-10.8)


 


Red Blood Count


  3.23 M/UL


(4.70-6.10)  L


 


Hemoglobin


  10.5 G/DL


(14.2-18.0)  L


 


Hematocrit


  32.1 %


(42.0-52.0)  L


 


Mean Corpuscular Volume 99 FL (80-99)  


 


Mean Corpuscular Hemoglobin


  32.6 PG


(27.0-31.0)  H


 


Mean Corpuscular Hemoglobin


Concent 32.8 G/DL


(32.0-36.0)


 


Red Cell Distribution Width


  12.4 %


(11.6-14.8)


 


Platelet Count


  276 K/UL


(150-450)


 


Mean Platelet Volume


  5.7 FL


(6.5-10.1)  L


 


Neutrophils (%) (Auto)


  58.3 %


(45.0-75.0)


 


Lymphocytes (%) (Auto)


  29.9 %


(20.0-45.0)


 


Monocytes (%) (Auto)


  9.6 %


(1.0-10.0)


 


Eosinophils (%) (Auto)


  1.5 %


(0.0-3.0)


 


Basophils (%) (Auto)


  0.7 %


(0.0-2.0)


 


Sodium Level


  140 MMOL/L


(136-145)


 


Potassium Level


  3.9 MMOL/L


(3.5-5.1)


 


Chloride Level


  104 MMOL/L


()


 


Carbon Dioxide Level


  34 MMOL/L


(21-32)  H


 


Anion Gap


  2 mmol/L


(5-15)  L


 


Blood Urea Nitrogen


  13 mg/dL


(7-18)


 


Creatinine


  0.7 MG/DL


(0.55-1.30)


 


Estimat Glomerular Filtration


Rate  mL/min (>60)  


 


 


Glucose Level


  75 MG/DL


()


 


Calcium Level


  8.5 MG/DL


(8.5-10.1)











Current Medications








 Medications


  (Trade)  Dose


 Ordered  Sig/Colleen


 Route


 PRN Reason  Start Time


 Stop Time Status Last Admin


Dose Admin


 


 Acetaminophen


  (Tylenol)  650 mg  Q4H  PRN


 ORAL


 FEVER  10/9/19 12:45


 11/8/19 12:44   


 


 


 Albuterol/


 Ipratropium


  (Albuterol/


 Ipratropium)  3 ml  Q4H  PRN


 HHN


 Shortness of Breath  10/12/19 12:45


 10/17/19 12:44  10/12/19 16:10


 


 


 Atorvastatin


 Calcium


  (Lipitor)  40 mg  BEDTIME


 ORAL


   10/10/19 21:00


 11/9/19 20:59  10/13/19 21:30


 


 


 Azithromycin


  (Zithromax)  500 mg  DAILY


 ORAL


   10/10/19 09:00


 10/17/19 08:59  10/14/19 08:51


 


 


 Carvedilol


  (Coreg)  6.25 mg  EVERY 12  HOURS


 ORAL


   10/10/19 21:00


 11/9/19 20:59  10/14/19 08:54


 


 


 Cefepime HCl 2 gm/


 Dextrose  110 ml @ 


 220 mls/hr  DAILY


 IV


   10/10/19 09:00


 10/17/19 08:59  10/14/19 08:55


 


 


 Dextrose


  (Dextrose 50%)  25 ml  Q30M  PRN


 IV


 Hypoglycemia  10/9/19 12:45


 11/8/19 12:44   


 


 


 Dextrose


  (Dextrose 50%)  50 ml  Q30M  PRN


 IV


 Hypoglycemia  10/9/19 12:45


 11/8/19 12:44   


 


 


 Digoxin


  (Lanoxin)  0.125 mg  DAILY


 ORAL


   10/11/19 09:00


 11/10/19 08:59  10/14/19 08:54


 


 


 Donepezil HCl


  (Aricept)  5 mg  QHS


 ORAL


   10/12/19 21:00


 11/11/19 20:59  10/13/19 21:30


 


 


 Escitalopram


 Oxalate


  (Lexapro)  5 mg  DAILY


 ORAL


   10/13/19 09:00


 11/12/19 08:59  10/14/19 08:51


 


 


 Lisinopril


  (Zestril)  10 mg  DAILY


 ORAL


   10/12/19 09:00


 11/11/19 08:59  10/14/19 08:52


 


 


 Lorazepam


  (Ativan 2mg/ml


 1ml)  2 mg  Q2H  PRN


 IV


 For Anxiety  10/9/19 12:45


 10/16/19 12:44   


 


 


 Polyethylene


 Glycol


  (Miralax)  17 gm  DAILYPRN  PRN


 ORAL


 Constipation  10/9/19 12:45


 11/8/19 12:44   


 


 


 Promethazine HCl/


 Codeine


  (Phenergan with


 Codeine)  5 ml  Q4H  PRN


 ORAL


 For Cough  10/9/19 12:45


 11/8/19 12:44   


 


 


 Rivaroxaban


  (Xarelto)  15 mg  DAILY


 ORAL


   10/10/19 09:00


 11/9/19 08:59  10/14/19 08:52


 


 


 Tamsulosin HCl


  (Flomax)  0.4 mg  QHS


 ORAL


   10/9/19 21:00


 11/8/19 20:59  10/13/19 21:31


 


 


 Trazodone HCl


  (Desyrel)  25 mg  BEDTIME


 ORAL


   10/9/19 21:00


 11/8/19 20:59  10/13/19 21:30


 


 


 Vancomycin HCl


  (Vanco rx to


 dose)  1 ea  DAILY  PRN


 MISC


 Per rx protocol  10/9/19 13:45


 11/8/19 13:44   


 


 


 Vancomycin HCl


 500 mg/Dextrose  110 ml @ 


 110 mls/hr  Q12H


 IVPB


   10/13/19 03:00


 10/18/19 02:59  10/14/19 03:23


 

















Opal Alamo M.D. Oct 14, 2019 14:18

## 2019-10-14 NOTE — NUR
HAND-OFF: Report given to RAHEEL Albert. Pt is in stable condition, sleeping in bed. Plan of care 
endorsed.

## 2019-10-14 NOTE — DIAGNOSTIC IMAGING REPORT
Indication: Shortness of breath

 

Technique: One view of the chest

 

Comparison: 10/9/2019

 

Findings: Left upper lobe infiltrate appears slightly more extensive. There is

suggestion of some volume loss as well. Right lung and pleural spaces remain clear.

Left chest biventricular AICD remains.

 

Impression: Suspect slight worsening of left upper lobe infiltrate and some

associated volume loss. The latter may in part be artifactual due to patient

rotation, however.

## 2019-10-14 NOTE — GENERAL PROGRESS NOTE
Assessment/Plan


Problem List:  


(1) UTI (urinary tract infection)


ICD Codes:  N39.0 - Urinary tract infection, site not specified


SNOMED:  34034781


Qualifiers:  


   Qualified Codes:  N39.0 - Urinary tract infection, site not specified


(2) Sepsis


ICD Codes:  A41.9 - Sepsis, unspecified organism


SNOMED:  88010949


Qualifiers:  


   Qualified Codes:  A41.9 - Sepsis, unspecified organism


(3) Pneumonia


ICD Codes:  J18.9 - Pneumonia, unspecified organism


SNOMED:  253756094


Qualifiers:  


   Qualified Codes:  J18.1 - Lobar pneumonia, unspecified organism


(4) Hypotension


ICD Codes:  I95.9 - Hypotension, unspecified


SNOMED:  53895241


(5) CHF (congestive heart failure)


ICD Codes:  I50.9 - Heart failure, unspecified


SNOMED:  06316437


(6) HTN (hypertension)


ICD Codes:  I10 - Essential (primary) hypertension


SNOMED:  34552699


Status:  stable, progressing, unchanged


Assessment/Plan:


o2 pulm tx abx pt diet cbc bmp am aru eval





Subjective


Constitutional:  Reports: weakness


Allergies:  


Coded Allergies:  


     No Known Allergies (Unverified , 10/9/19)


All Systems:  reviewed and negative except above


Subjective


o2nc calm in bed





Objective





Last 24 Hour Vital Signs








  Date Time  Temp Pulse Resp B/P (MAP) Pulse Ox O2 Delivery O2 Flow Rate FiO2


 


10/14/19 08:54  84      


 


10/14/19 08:54  84  117/55    


 


10/14/19 08:52    117/55    


 


10/14/19 08:00 98.9 66 18 117/55 (75) 99   


 


10/14/19 04:00 97.4 57 17 123/58 (79) 96   


 


10/14/19 04:00  65      


 


10/14/19 00:00  60      


 


10/14/19 00:00 97.6 60 16 113/58 (76) 99   


 


10/13/19 21:31  90  110/58    


 


10/13/19 21:00      Nasal Cannula 2.0 


 


10/13/19 20:22  72 16  96 Nasal Cannula 2.0 28


 


10/13/19 20:22     96 Nasal Cannula 2.0 28


 


10/13/19 20:00 97.6 90 18 110/58 (75) 97   


 


10/13/19 20:00  64      


 


10/13/19 16:00 99.1 66 20 103/56 (72) 95   


 


10/13/19 16:00  64      


 


10/13/19 12:00  64      


 


10/13/19 12:00 97.9 72 18 123/59 (80) 97   


 


10/13/19 09:00      Nasal Cannula 2.0 

















Intake and Output  


 


 10/13/19 10/14/19





 19:00 07:00


 


Intake Total 720 ml 


 


Balance 720 ml 


 


  


 


Intake Oral 720 ml 


 


# Bowel Movements 1 1








Laboratory Tests


10/13/19 09:50: 


White Blood Count 7.6, Red Blood Count 3.47L, Hemoglobin 11.2L, Hematocrit 34.9L

, Mean Corpuscular Volume 101H, Mean Corpuscular Hemoglobin 32.2H, Mean 

Corpuscular Hemoglobin Concent 32.0, Red Cell Distribution Width 12.4, Platelet 

Count 282, Mean Platelet Volume 6.1L, Neutrophils (%) (Auto) 67.4, Lymphocytes (

%) (Auto) 23.9, Monocytes (%) (Auto) 7.7, Eosinophils (%) (Auto) 0.5, Basophils 

(%) (Auto) 0.5, Sodium Level 142, Potassium Level 3.5, Chloride Level 105, 

Carbon Dioxide Level 32, Anion Gap 5, Blood Urea Nitrogen 15, Creatinine 0.8, 

Estimat Glomerular Filtration Rate , Glucose Level 107H, Calcium Level 8.8


10/14/19 06:35: 


White Blood Count 5.7, Red Blood Count 3.23L, Hemoglobin 10.5L, Hematocrit 32.1L

, Mean Corpuscular Volume 99, Mean Corpuscular Hemoglobin 32.6H, Mean 

Corpuscular Hemoglobin Concent 32.8, Red Cell Distribution Width 12.4, Platelet 

Count 276, Mean Platelet Volume 5.7L, Neutrophils (%) (Auto) 58.3, Lymphocytes (

%) (Auto) 29.9, Monocytes (%) (Auto) 9.6, Eosinophils (%) (Auto) 1.5, Basophils 

(%) (Auto) 0.7, Sodium Level 140, Potassium Level 3.9, Chloride Level 104, 

Carbon Dioxide Level 34H, Anion Gap 2L, Blood Urea Nitrogen 13, Creatinine 0.7, 

Estimat Glomerular Filtration Rate , Glucose Level 75, Calcium Level 8.5


Height (Feet):  5


Height (Inches):  2.00


Weight (Pounds):  110


General Appearance:  lethargic


EENT:  normal ENT inspection


Neck:  normal alignment


Cardiovascular:  normal peripheral pulses, normal rate, regular rhythm


Respiratory/Chest:  chest wall non-tender, lungs clear, normal breath sounds


Abdomen:  normal bowel sounds, non tender, soft


Extremities:  normal inspection


Edema:  no edema noted Arm (L), no edema noted Arm (R), no edema noted Leg (L), 

no edema noted Leg (R), no edema noted Pedal (L), no edema noted Pedal (R), no 

edema noted Generalized


Neurologic:  motor weakness


Skin:  normal pigmentation, warm/dry











Timo Morejon DO Oct 14, 2019 09:00

## 2019-10-14 NOTE — NUR
NURSE NOTES:

Pt received from RAHEEL Hagan and RAHEEL Mario alert and oriented x3 with no acute s/s of 
distress noted at bedside. IV site asymptomatic and patent on R fa 22g saline lock. On 2L 
NC, saturating at 94% with no acute s/s of resp distress noted. Bed in lowest position, bed 
alarm on. Call light and belongings within reach.

## 2019-10-14 NOTE — NUR
CASE MANAGEMENT: REVIEW 



10/14/2019



SI:SEVERE SEPSIS. 

T 97.4 HR 57 RR 17 B/P 123/58 SATS 96% ON 2L/NC 

CO2 34 



IS:COREG PO Q12H 

LIPITOR PO QHS 

XARELTO PO QD

CEFEPIME IV QD

AZITHROMYCIN PO QD

DIGOXIN PO QD

LISINOPRIL PO QD

VANCO IV Q12H 



**TELE STATUS**

## 2019-10-14 NOTE — NUR
NURSE NOTES:

HAND-OFF: 

Report given to Nichelle RN, patient in stable condition, plan of care 
endorsed..

## 2019-10-14 NOTE — PROGRESS NOTE
DATE:  10/14/2019

SUBJECTIVE:  This is an 83-year-old male patient, who _____ shortness of

breath and weakness.  He has altered mental status and decline in

cognition below his baseline.  That is why, his attending physician has

requested daily psychiatric consultation.  He still endorses altered

mental status, confusion, and intermittent mood lability worsened by the

stress of his medical illness.  That is why, his attending has requested

daily psychiatric consultation.



MENTAL STATUS EXAMINATION:  This is an 83-year-old male.  His appearance is

disheveled.  Attitude, irritable and agitated.  Affect, guarded and

restricted.  Intellect poor.  Mood, depressed and anxious.  Motor

activity, psychomotor agitation.  Attention span is poor.  Orientation x2.

Speech is low volume and slurred.  Thought process, disorganized and

illogical.  Thought content, paranoid delusions.  Insight and judgment is

poor.



DIAGNOSIS:  Major depressive disorder, mild, recurrent with psychotic

features, rule out bipolar depression with psychosis; rule out dementia

with psychosis.



PLAN:  Aricept 5 mg twice a day, Celexa 10 mg daily and Ativan 2 mg every 2

hours p.r.n. anxiety and agitation.  Provided him with 20 minutes of

cognitive behavioral therapy to help him identify his automatic negative

thoughts and help him convert those negative thoughts to more positive

thoughts to reduce depression, anxiety, and mood lability.  Chart reviewed

and discussed with staff.  Seen and assessed in his room.









  ______________________________________________

  Vero Andrea M.D.





DR:  NAKUL

D:  10/14/2019 13:52

T:  10/14/2019 17:55

JOB#:  [id number]

CC:

## 2019-10-14 NOTE — NUR
NURSE NOTES:

Received phone call from Ajith regarding duplicate CT chest with no contrast for pt. RN left 
message with Dr. Carroll to confirm whether he wants another CT chest or cancel order d/t 
duplicate orders. Awaiting call back.

## 2019-10-14 NOTE — PULMONOLOGY PROGRESS NOTE
Assessment/Plan


Problems:  


(1) Pneumonia


(2) Chronic anticoagulation


(3) HTN (hypertension)


(4) BPH (benign prostatic hyperplasia)


(5) CAD (coronary artery disease)


(6) COPD (chronic obstructive pulmonary disease)


(7) Atrial fibrillation


(8) CHF (congestive heart failure)


(9) Sepsis


(10) ICD (implantable cardioverter-defibrillator) in place


Assessment/Plan


wbc decreasing, back to normal 


continue abx


pt didn't want CT chest with contrast, will get ct without contrast, will order 

agian


all  culture results are negative


chest pt





heart rate controlled





Subjective


ROS Limited/Unobtainable:  No


Constitutional:  Reports: no symptoms


HEENT:  Repors: no symptoms


Respiratory:  Reports: no symptoms


Allergies:  


Coded Allergies:  


     No Known Allergies (Unverified , 10/9/19)





Objective





Last 24 Hour Vital Signs








  Date Time  Temp Pulse Resp B/P (MAP) Pulse Ox O2 Delivery O2 Flow Rate FiO2


 


10/14/19 09:00      Nasal Cannula 2.0 


 


10/14/19 08:54  84      


 


10/14/19 08:54  84  117/55    


 


10/14/19 08:52    117/55    


 


10/14/19 08:00 98.9 66 18 117/55 (75) 99   


 


10/14/19 08:00  74      


 


10/14/19 04:00 97.4 57 17 123/58 (79) 96   


 


10/14/19 04:00  65      


 


10/14/19 00:00  60      


 


10/14/19 00:00 97.6 60 16 113/58 (76) 99   


 


10/13/19 21:31  90  110/58    


 


10/13/19 21:00      Nasal Cannula 2.0 


 


10/13/19 20:22  72 16  96 Nasal Cannula 2.0 28


 


10/13/19 20:22     96 Nasal Cannula 2.0 28


 


10/13/19 20:00 97.6 90 18 110/58 (75) 97   


 


10/13/19 20:00  64      


 


10/13/19 16:00 99.1 66 20 103/56 (72) 95   


 


10/13/19 16:00  64      

















Intake and Output  


 


 10/13/19 10/14/19





 19:00 07:00


 


Intake Total 720 ml 


 


Balance 720 ml 


 


  


 


Intake Oral 720 ml 


 


# Bowel Movements 1 1








General Appearance:  WD/WN


HEENT:  normocephalic, atraumatic, anicteric


Respiratory/Chest:  chest wall non-tender, lungs clear


Cardiovascular:  normal peripheral pulses, normal rate


Abdomen:  normal bowel sounds, soft, non tender


Genitourinary:  normal external genitalia


Extremities:  no cyanosis


Skin:  no rash, no lesions


Neurologic/Psychiatric:  CNs II-XII grossly normal, no motor/sensory deficits


Lymphatic:  no neck adenopathy


Musculoskeletal:  normal muscle bulk


Laboratory Tests


10/14/19 06:35: 


White Blood Count 5.7, Red Blood Count 3.23L, Hemoglobin 10.5L, Hematocrit 32.1L

, Mean Corpuscular Volume 99, Mean Corpuscular Hemoglobin 32.6H, Mean 

Corpuscular Hemoglobin Concent 32.8, Red Cell Distribution Width 12.4, Platelet 

Count 276, Mean Platelet Volume 5.7L, Neutrophils (%) (Auto) 58.3, Lymphocytes (

%) (Auto) 29.9, Monocytes (%) (Auto) 9.6, Eosinophils (%) (Auto) 1.5, Basophils 

(%) (Auto) 0.7, Sodium Level 140, Potassium Level 3.9, Chloride Level 104, 

Carbon Dioxide Level 34H, Anion Gap 2L, Blood Urea Nitrogen 13, Creatinine 0.7, 

Estimat Glomerular Filtration Rate , Glucose Level 75, Calcium Level 8.5





Current Medications








 Medications


  (Trade)  Dose


 Ordered  Sig/Colleen


 Route


 PRN Reason  Start Time


 Stop Time Status Last Admin


Dose Admin


 


 Acetaminophen


  (Tylenol)  650 mg  Q4H  PRN


 ORAL


 FEVER  10/9/19 12:45


 11/8/19 12:44   


 


 


 Albuterol/


 Ipratropium


  (Albuterol/


 Ipratropium)  3 ml  Q4H  PRN


 HHN


 Shortness of Breath  10/12/19 12:45


 10/17/19 12:44  10/12/19 16:10


 


 


 Atorvastatin


 Calcium


  (Lipitor)  40 mg  BEDTIME


 ORAL


   10/10/19 21:00


 11/9/19 20:59  10/13/19 21:30


 


 


 Azithromycin


  (Zithromax)  500 mg  DAILY


 ORAL


   10/10/19 09:00


 10/17/19 08:59  10/14/19 08:51


 


 


 Carvedilol


  (Coreg)  6.25 mg  EVERY 12  HOURS


 ORAL


   10/10/19 21:00


 11/9/19 20:59  10/14/19 08:54


 


 


 Cefepime HCl 2 gm/


 Dextrose  110 ml @ 


 220 mls/hr  DAILY


 IV


   10/10/19 09:00


 10/17/19 08:59  10/14/19 08:55


 


 


 Dextrose


  (Dextrose 50%)  25 ml  Q30M  PRN


 IV


 Hypoglycemia  10/9/19 12:45


 11/8/19 12:44   


 


 


 Dextrose


  (Dextrose 50%)  50 ml  Q30M  PRN


 IV


 Hypoglycemia  10/9/19 12:45


 11/8/19 12:44   


 


 


 Digoxin


  (Lanoxin)  0.125 mg  DAILY


 ORAL


   10/11/19 09:00


 11/10/19 08:59  10/14/19 08:54


 


 


 Donepezil HCl


  (Aricept)  5 mg  QHS


 ORAL


   10/12/19 21:00


 11/11/19 20:59  10/13/19 21:30


 


 


 Escitalopram


 Oxalate


  (Lexapro)  5 mg  DAILY


 ORAL


   10/13/19 09:00


 11/12/19 08:59  10/14/19 08:51


 


 


 Lisinopril


  (Zestril)  10 mg  DAILY


 ORAL


   10/12/19 09:00


 11/11/19 08:59  10/14/19 08:52


 


 


 Lorazepam


  (Ativan 2mg/ml


 1ml)  2 mg  Q2H  PRN


 IV


 For Anxiety  10/9/19 12:45


 10/16/19 12:44   


 


 


 Polyethylene


 Glycol


  (Miralax)  17 gm  DAILYPRN  PRN


 ORAL


 Constipation  10/9/19 12:45


 11/8/19 12:44   


 


 


 Promethazine HCl/


 Codeine


  (Phenergan with


 Codeine)  5 ml  Q4H  PRN


 ORAL


 For Cough  10/9/19 12:45


 11/8/19 12:44   


 


 


 Rivaroxaban


  (Xarelto)  15 mg  DAILY


 ORAL


   10/10/19 09:00


 11/9/19 08:59  10/14/19 08:52


 


 


 Tamsulosin HCl


  (Flomax)  0.4 mg  QHS


 ORAL


   10/9/19 21:00


 11/8/19 20:59  10/13/19 21:31


 


 


 Trazodone HCl


  (Desyrel)  25 mg  BEDTIME


 ORAL


   10/9/19 21:00


 11/8/19 20:59  10/13/19 21:30


 


 


 Vancomycin HCl


  (Vanco rx to


 dose)  1 ea  DAILY  PRN


 MISC


 Per rx protocol  10/9/19 13:45


 11/8/19 13:44   


 


 


 Vancomycin HCl


 500 mg/Dextrose  110 ml @ 


 110 mls/hr  Q12H


 IVPB


   10/13/19 03:00


 10/18/19 02:59  10/14/19 03:23


 

















Lucy Carroll MD Oct 14, 2019 12:32

## 2019-10-14 NOTE — NUR
Received report from RAHEEL Cano. Patient is resting in bed with no s/s of distress or 
discomfort noted at this time. Patient's IV is intact and saline locked. Bed is in lowest 
position, side rails up x2, brakes engaged, call light within reach at all times. Patient is 
stable at this time. Will continue to monitor.

## 2019-10-14 NOTE — NUR
NURSE NOTES:WOUND CARE NOTES:Pt presented on admission with non -blanching erythema without 
induration sacrum.Non-blanching erythema R heel and lateral R foot . R heel is boggy. 
Non-blanching erythema without fluctuance L heel . No other skin concerns noted.



Recommendations: Apply Moisture Barrier paste to sacrum.Cover with Optifoam drsg. Change 
every 3 days and prn.

                          Reposition at least every 2hors or as tolerated.

                          Apply Cavilon Skin Barrier to both heels. Cover each heel with 
Optifoam drsg. Change every 7 days and 

                          prn.

                          Off-load heels with pillow.

## 2019-10-15 VITALS — DIASTOLIC BLOOD PRESSURE: 61 MMHG | SYSTOLIC BLOOD PRESSURE: 122 MMHG

## 2019-10-15 VITALS — SYSTOLIC BLOOD PRESSURE: 106 MMHG | DIASTOLIC BLOOD PRESSURE: 57 MMHG

## 2019-10-15 VITALS — DIASTOLIC BLOOD PRESSURE: 55 MMHG | SYSTOLIC BLOOD PRESSURE: 114 MMHG

## 2019-10-15 VITALS — SYSTOLIC BLOOD PRESSURE: 129 MMHG | DIASTOLIC BLOOD PRESSURE: 59 MMHG

## 2019-10-15 VITALS — SYSTOLIC BLOOD PRESSURE: 95 MMHG | DIASTOLIC BLOOD PRESSURE: 54 MMHG

## 2019-10-15 VITALS — DIASTOLIC BLOOD PRESSURE: 61 MMHG | SYSTOLIC BLOOD PRESSURE: 118 MMHG

## 2019-10-15 LAB
ADD MANUAL DIFF: NO
ANION GAP SERPL CALC-SCNC: 1 MMOL/L (ref 5–15)
APPEARANCE UR: (no result)
APTT PPP: (no result) S
BASOPHILS NFR BLD AUTO: 1.2 % (ref 0–2)
BUN SERPL-MCNC: 14 MG/DL (ref 7–18)
CALCIUM SERPL-MCNC: 8.7 MG/DL (ref 8.5–10.1)
CHLORIDE SERPL-SCNC: 104 MMOL/L (ref 98–107)
CO2 SERPL-SCNC: 33 MMOL/L (ref 21–32)
CREAT SERPL-MCNC: 0.7 MG/DL (ref 0.55–1.3)
EOSINOPHIL NFR BLD AUTO: 2.1 % (ref 0–3)
ERYTHROCYTE [DISTWIDTH] IN BLOOD BY AUTOMATED COUNT: 12.1 % (ref 11.6–14.8)
GLUCOSE UR STRIP-MCNC: NEGATIVE MG/DL
HCT VFR BLD CALC: 32.7 % (ref 42–52)
HGB BLD-MCNC: 10.7 G/DL (ref 14.2–18)
KETONES UR QL STRIP: (no result)
LEUKOCYTE ESTERASE UR QL STRIP: (no result)
LYMPHOCYTES NFR BLD AUTO: 27 % (ref 20–45)
MCV RBC AUTO: 99 FL (ref 80–99)
MONOCYTES NFR BLD AUTO: 8.1 % (ref 1–10)
NEUTROPHILS NFR BLD AUTO: 61.6 % (ref 45–75)
NITRITE UR QL STRIP: NEGATIVE
PH UR STRIP: 6 [PH] (ref 4.5–8)
PLATELET # BLD: 276 K/UL (ref 150–450)
POTASSIUM SERPL-SCNC: 4.2 MMOL/L (ref 3.5–5.1)
PROT UR QL STRIP: (no result)
RBC # BLD AUTO: 3.31 M/UL (ref 4.7–6.1)
SODIUM SERPL-SCNC: 138 MMOL/L (ref 136–145)
SP GR UR STRIP: 1.01 (ref 1–1.03)
UROBILINOGEN UR-MCNC: NORMAL MG/DL (ref 0–1)
WBC # BLD AUTO: 5.8 K/UL (ref 4.8–10.8)

## 2019-10-15 RX ADMIN — TAMSULOSIN HYDROCHLORIDE SCH MG: 0.4 CAPSULE ORAL at 21:03

## 2019-10-15 RX ADMIN — DIGOXIN SCH MG: 0.12 TABLET ORAL at 09:22

## 2019-10-15 RX ADMIN — SODIUM CHLORIDE SCH MLS/HR: 9 INJECTION, SOLUTION INTRAVENOUS at 15:48

## 2019-10-15 RX ADMIN — ATORVASTATIN CALCIUM SCH MG: 20 TABLET, FILM COATED ORAL at 21:03

## 2019-10-15 RX ADMIN — LISINOPRIL SCH MG: 10 TABLET ORAL at 09:22

## 2019-10-15 RX ADMIN — CARVEDILOL SCH MG: 6.25 TABLET, FILM COATED ORAL at 09:22

## 2019-10-15 RX ADMIN — SODIUM CHLORIDE SCH MLS/HR: 0.9 INJECTION INTRAVENOUS at 09:22

## 2019-10-15 RX ADMIN — TRAZODONE HYDROCHLORIDE SCH MG: 50 TABLET ORAL at 21:03

## 2019-10-15 RX ADMIN — SODIUM CHLORIDE SCH MLS/HR: 9 INJECTION, SOLUTION INTRAVENOUS at 04:20

## 2019-10-15 RX ADMIN — DONEPEZIL HYDROCHLORIDE SCH MG: 5 TABLET, FILM COATED ORAL at 21:03

## 2019-10-15 RX ADMIN — CARVEDILOL SCH MG: 6.25 TABLET, FILM COATED ORAL at 21:05

## 2019-10-15 RX ADMIN — RIVAROXABAN SCH MG: 15 TABLET, FILM COATED ORAL at 09:22

## 2019-10-15 NOTE — NUR
NURSE NOTES:

Patient received from Roosevelt RN. Patient sitting in bed eating. AV paced, AOx4 with no s/sx of 
pain or distress. RR even and unlabored on RA. Side rails up x2, bed low and locked. Will 
continue to monitor.

## 2019-10-15 NOTE — INFECTIOUS DISEASES PROG NOTE
Assessment/Plan


Assessment/Plan





Assessment:


SEvere sepsis 2ry to PNA


   -10/14 CXR: Suspect slight worsening of left upper lobe infiltrate and some 

associated volume loss. The latter may in part be artifactual due to patient 

rotation, however.


    -CT chest:  Consolidation within the left upper lobe.  Bilateral patchy 

groundglass and interstitial infiltrates.  Findings could be related to 

infectious inflammatory infiltrate.  Posttreatment imaging recommended to 

ensure resolution and exclude underlying neoplastic process malignancy.  

Bilateral pleural effusions as well as ascites and subcutaneous 


edema anasarca..  Cardiomegaly.





Transient hypotension


    -CXR:  Left upper lobe consolidation most likely due to pneumonia


    -influenza sc, legionella ag urine neg


    -sp cx p





 Afebrile


Leukocytosis; SP


   -u/a wbc 15-20, nit neg, leuk +1; ucx Neg





Lactic acidosis, mild- resolved





Afib on AC


HTN


HLD


CHF


anemia


BPH 


schizophrenia


CAD/MI s/p CABG


 s/p AICD


 tobacco abuse


 cardiomyopathy EF 25-30%


[facility]


 


Plan:


-Continue empiric IV Vancomycin, Cefepime #7/10 pending sp cx


    -10/14 SP azithromycin #6


   -10/9 SP ZOsyn x1





-f/u cx


-Monitor CBC/CMP, temperatures


-f/u sp cx (still pending)


-aspiration precautions





Thank you for this consultation. Will continue to follow along with you.





Discussed with RN





Subjective


Allergies:  


Coded Allergies:  


     No Known Allergies (Unverified , 10/9/19)


Subjective


afebrile


no leukocytosis





Objective


Vital Signs





Last 24 Hour Vital Signs








  Date Time  Temp Pulse Resp B/P (MAP) Pulse Ox O2 Delivery O2 Flow Rate FiO2


 


10/15/19 12:00 98.1 76 18 95/54 (68) 96   


 


10/15/19 12:00  62      


 


10/15/19 09:53  76      


 


10/15/19 09:22    114/55    


 


10/15/19 09:22  64      


 


10/15/19 09:22  64  114/55    


 


10/15/19 08:50     96 Nasal Cannula 2.0 28


 


10/15/19 08:49  64 20  96 Nasal Cannula 2.0 28


 


10/15/19 08:49      Nasal Cannula 2.0 


 


10/15/19 07:58 97.9 77 20 114/55 (74) 95   


 


10/15/19 04:00 98.4 63 18 122/61 (81) 100   


 


10/15/19 04:00  60      


 


10/15/19 00:00 98.5 72 18 118/61 (80) 97   


 


10/15/19 00:00  60      


 


10/14/19 21:00      Nasal Cannula 2.0 


 


10/14/19 20:40  65  116/44    


 


10/14/19 20:00  65      


 


10/14/19 20:00 98.5 64 18 116/44 (68) 98   


 


10/14/19 19:52  68 18  97 Nasal Cannula 2.0 28


 


10/14/19 19:52     97 Nasal Cannula 2.0 28


 


10/14/19 16:00 98.5 63 20 112/49 (70) 96   


 


10/14/19 16:00  64      








Height (Feet):  5


Height (Inches):  2.00


Weight (Pounds):  110


Objective


GENERAL:  A thin elderly appearing  male, in no acute


distress.


HEENT:  Normocephalic and atraumatic.  Bitemporal wasting.  Pupils are


equal, round, and reactive to light.  Sclerae anicteric.  Oral mucosa are


moist.


NECK:  Supple.  There is no jugular venous distention.


LUNGS:  Left rales.


HEART:  Regular S1, S2.  No murmur or S3.


ABDOMEN:  Soft and nontender.  No palpable mass.


EXTREMITIES:  No cyanosis, clubbing, or edema.





Microbiology








 Date/Time


Source Procedure


Growth Status


 


 


 10/14/19 22:17


Urine,Clean Catch Urine Culture - Preliminary


NO GROWTH AFTER 24 HOURS Resulted











Laboratory Tests








Test


  10/14/19


22:17 10/15/19


02:00


 


Urine Color Binta   


 


Urine Appearance Cloudy   


 


Urine pH 6 (4.5-8.0)   


 


Urine Specific Gravity


  1.015


(1.005-1.035) 


 


 


Urine Protein


  2+ (NEGATIVE)


H 


 


 


Urine Glucose (UA)


  Negative


(NEGATIVE) 


 


 


Urine Ketones


  1+ (NEGATIVE)


H 


 


 


Urine Blood


  5+ (NEGATIVE)


H 


 


 


Urine Nitrite


  Negative


(NEGATIVE) 


 


 


Urine Bilirubin


  Negative


(NEGATIVE) 


 


 


Urine Ictotest


  Negative


(NEGATIVE) 


 


 


Urine Urobilinogen


  Normal MG/DL


(0.0-1.0) 


 


 


Urine Leukocyte Esterase


  1+ (NEGATIVE)


H 


 


 


Urine RBC


  Tntc /HPF (0 -


0)  H 


 


 


Urine WBC


  10-15 /HPF (0


- 0)  H 


 


 


Urine Squamous Epithelial


Cells None /LPF


(NONE/OCC) 


 


 


Urine Bacteria


  Few /HPF


(NONE) 


 


 


White Blood Count


  


  5.8 K/UL


(4.8-10.8)


 


Red Blood Count


  


  3.31 M/UL


(4.70-6.10)  L


 


Hemoglobin


  


  10.7 G/DL


(14.2-18.0)  L


 


Hematocrit


  


  32.7 %


(42.0-52.0)  L


 


Mean Corpuscular Volume  99 FL (80-99)  


 


Mean Corpuscular Hemoglobin


  


  32.4 PG


(27.0-31.0)  H


 


Mean Corpuscular Hemoglobin


Concent 


  32.8 G/DL


(32.0-36.0)


 


Red Cell Distribution Width


  


  12.1 %


(11.6-14.8)


 


Platelet Count


  


  276 K/UL


(150-450)


 


Mean Platelet Volume


  


  5.8 FL


(6.5-10.1)  L


 


Neutrophils (%) (Auto)


  


  61.6 %


(45.0-75.0)


 


Lymphocytes (%) (Auto)


  


  27.0 %


(20.0-45.0)


 


Monocytes (%) (Auto)


  


  8.1 %


(1.0-10.0)


 


Eosinophils (%) (Auto)


  


  2.1 %


(0.0-3.0)


 


Basophils (%) (Auto)


  


  1.2 %


(0.0-2.0)


 


Sodium Level


  


  138 MMOL/L


(136-145)


 


Potassium Level


  


  4.2 MMOL/L


(3.5-5.1)


 


Chloride Level


  


  104 MMOL/L


()


 


Carbon Dioxide Level


  


  33 MMOL/L


(21-32)  H


 


Anion Gap


  


  1 mmol/L


(5-15)  L


 


Blood Urea Nitrogen


  


  14 mg/dL


(7-18)


 


Creatinine


  


  0.7 MG/DL


(0.55-1.30)


 


Estimat Glomerular Filtration


Rate 


   mL/min (>60)  


 


 


Glucose Level


  


  93 MG/DL


()


 


Calcium Level


  


  8.7 MG/DL


(8.5-10.1)


 


Vancomycin Level Trough


  


  7.5 ug/mL


(5.0-12.0)











Current Medications








 Medications


  (Trade)  Dose


 Ordered  Sig/Colleen


 Route


 PRN Reason  Start Time


 Stop Time Status Last Admin


Dose Admin


 


 Acetaminophen


  (Tylenol)  650 mg  Q4H  PRN


 ORAL


 FEVER  10/9/19 12:45


 11/8/19 12:44   


 


 


 Albuterol/


 Ipratropium


  (Albuterol/


 Ipratropium)  3 ml  Q4H  PRN


 HHN


 Shortness of Breath  10/12/19 12:45


 10/17/19 12:44  10/12/19 16:10


 


 


 Atorvastatin


 Calcium


  (Lipitor)  40 mg  BEDTIME


 ORAL


   10/10/19 21:00


 11/9/19 20:59  10/14/19 20:39


 


 


 Carvedilol


  (Coreg)  6.25 mg  EVERY 12  HOURS


 ORAL


   10/10/19 21:00


 11/9/19 20:59  10/15/19 09:22


 


 


 Cefepime HCl 2 gm/


 Dextrose  110 ml @ 


 220 mls/hr  DAILY


 IV


   10/10/19 09:00


 10/17/19 08:59  10/15/19 09:22


 


 


 Dextrose


  (Dextrose 50%)  25 ml  Q30M  PRN


 IV


 Hypoglycemia  10/9/19 12:45


 11/8/19 12:44   


 


 


 Dextrose


  (Dextrose 50%)  50 ml  Q30M  PRN


 IV


 Hypoglycemia  10/9/19 12:45


 11/8/19 12:44   


 


 


 Digoxin


  (Lanoxin)  0.125 mg  DAILY


 ORAL


   10/11/19 09:00


 11/10/19 08:59  10/15/19 09:22


 


 


 Donepezil HCl


  (Aricept)  5 mg  QHS


 ORAL


   10/12/19 21:00


 11/11/19 20:59  10/14/19 20:40


 


 


 Escitalopram


 Oxalate


  (Lexapro)  5 mg  DAILY


 ORAL


   10/13/19 09:00


 11/12/19 08:59  10/15/19 09:22


 


 


 Lisinopril


  (Zestril)  10 mg  DAILY


 ORAL


   10/12/19 09:00


 11/11/19 08:59  10/15/19 09:22


 


 


 Lorazepam


  (Ativan 2mg/ml


 1ml)  2 mg  Q2H  PRN


 IV


 For Anxiety  10/9/19 12:45


 10/16/19 12:44   


 


 


 Polyethylene


 Glycol


  (Miralax)  17 gm  DAILYPRN  PRN


 ORAL


 Constipation  10/9/19 12:45


 11/8/19 12:44   


 


 


 Promethazine HCl/


 Codeine


  (Phenergan with


 Codeine)  5 ml  Q4H  PRN


 ORAL


 For Cough  10/9/19 12:45


 11/8/19 12:44   


 


 


 Rivaroxaban


  (Xarelto)  15 mg  DAILY


 ORAL


   10/10/19 09:00


 11/9/19 08:59  10/15/19 09:22


 


 


 Tamsulosin HCl


  (Flomax)  0.4 mg  QHS


 ORAL


   10/9/19 21:00


 11/8/19 20:59  10/14/19 20:40


 


 


 Trazodone HCl


  (Desyrel)  25 mg  BEDTIME


 ORAL


   10/9/19 21:00


 11/8/19 20:59  10/14/19 20:40


 


 


 Vancomycin HCl


  (Vanco rx to


 dose)  1 ea  DAILY  PRN


 MISC


 Per rx protocol  10/9/19 13:45


 11/8/19 13:44   


 


 


 Vancomycin HCl 1


 gm/Dextrose  275 ml @ 


 183.708


 mls/hr  Q12H


 IVPB


   10/15/19 04:00


 10/20/19 03:59  10/15/19 04:20


 

















Opal Alamo M.D. Oct 15, 2019 13:21

## 2019-10-15 NOTE — NUR
NURSE NOTES:

Received report from JOSE Wasserman RN.patient in bed AAO X3-4 with no complaints of acute pain 
or discomfort at this time. Kept clean, dry, and comfortable in bed. IV line intact and 
patent SL. Placed on continuous cardiac monitoring per protocol. On bedrest for weakness. 
Safety precaution in place; siderails X3 up, call light within reach, bed in lowest 
position, brakes and alarm on at all times. Needs and wants anticipated and attended, will 
continue plan of care and monitor for any changes.



Continue ABX TX

WC orders in place

## 2019-10-15 NOTE — PROGRESS NOTE
DATE:  10/15/2019

SUBJECTIVE:  This is an 83-year-old male patient with shortness of breath

and generalized weakness.  He also has confusion, disorganized thought

process, weakness, shortness of breath.  He has got some mood lability and

agitation.  That is why, his attending has requested daily psychiatric

consultation.



DIAGNOSIS:  Major depressive disorder, mild, recurrent with psychotic

features, rule out dementia with psychosis.



PLAN:  Treat him with Aricept 5 mg at bedtime, Celexa 10 mg daily,

trazodone 50 mg at bedtime, Ativan 2 mg IV every 2 hours p.r.n. anxiety

and agitation.  Provided him with 20 minutes of cognitive behavioral

therapy to help him identify his automatic negative thoughts and help him

convert those negative thoughts to more positive thoughts to reduce

depression, anxiety, and mood lability.  Chart reviewed and discussed with

staff.  Seen and assessed in his room.









  ______________________________________________

  Vero Andrea M.D.





DR:  VIRGILIO

D:  10/15/2019 06:56

T:  10/15/2019 19:59

JOB#:  [id number]

CC:

## 2019-10-15 NOTE — CONSULTATION
History of Present Illness


General


Date patient seen:  Oct 15, 2019


Chief Complaint:  Abnormal Labs





Present Illness


HPI


afebrile


no leuckoytosis


Allergies:  


Coded Allergies:  


     No Known Allergies (Unverified , 10/9/19)





Medication History


Scheduled


Atorvastatin Calcium* (Atorvastatin Calcium*), 40 MG ORAL BEDTIME, (Reported)


Carvedilol* (Carvedilol*), 25 MG ORAL EVERY 12 HOURS, (Reported)


Dextran 70/Hypromellose (Artificial Tears Eye Drops*), 1 DROP LEFT EYE TID PRN, 

(Reported)


Digoxin* (Digoxin*), 125 MCG ORAL DAILY, (Reported)


Donepezil Hcl* (Donepezil Hcl*), 10 MG ORAL QHS, (Reported)


Dutasteride (Avodart), 0.5 MG ORAL DAILY, (Reported)


Escitalopram Oxalate* (Lexapro*), 10 MG ORAL DAILY, (Reported)


Fluticasone/Vilanterol (Breo Ellipta 100-25 Mcg INH), 1 EACH IH DAILY, (Reported

)


Furosemide* (Lasix*), 20 MG ORAL DAILY, (Reported)


Lisinopril (Lisinopril*), 20 MG ORAL DAILY, (Reported)


Rivaroxaban (Xarelto*), 20 MG ORAL DAILY, (Reported)


Roflumilast (Daliresp), 500 MCG PO DAILY, (Reported)


Tamsulosin HCl (Flomax), 0.4 MG ORAL QHS, (Reported)


Tiotropium Bromide* (Spiriva*), 1 PUFF INH DAILY, (Reported)


Trazodone Hcl (Desyrel), 25 MG PO DAILY, (Reported)





Scheduled PRN


Acetaminophen* (Acetaminophen 325MG Tablet*), 650 MG ORAL Q4H PRN for Mild Pain/

Temp > 100.5, (Reported)


Albuterol Sulfate* (Albuterol Sulfate Hhn*), 3 ML INH Q4H PRN for Shortness of 

Breath, (Reported)


Nitroglycerin (Nitrostat), 0.4 MG SL Q5M X3 DOSES PRN for CHEST PAIN, (Reported)





Discontinued Medications


Digoxin* (Digoxin*), 0.125 MG ORAL DAILY, (Reported)


   Discontinued Reason: Prescription changed


Trazodone* (Trazodone*), 25 MG ORAL BEDTIME, (Reported)


   Discontinued Reason: Prescription changed





Patient History


Healthcare decision maker


Eva


Resuscitation status


Do Not Resuscitate


Advanced Directive on File


No





Physical Exam





Last 24 Hour Vital Signs








  Date Time  Temp Pulse Resp B/P (MAP) Pulse Ox O2 Delivery O2 Flow Rate FiO2


 


10/15/19 12:00 98.1 76 18 95/54 (68) 96   


 


10/15/19 12:00  62      


 


10/15/19 09:53  76      


 


10/15/19 09:22    114/55    


 


10/15/19 09:22  64      


 


10/15/19 09:22  64  114/55    


 


10/15/19 08:50     96 Nasal Cannula 2.0 28


 


10/15/19 08:49  64 20  96 Nasal Cannula 2.0 28


 


10/15/19 08:49      Nasal Cannula 2.0 


 


10/15/19 07:58 97.9 77 20 114/55 (74) 95   


 


10/15/19 04:00 98.4 63 18 122/61 (81) 100   


 


10/15/19 04:00  60      


 


10/15/19 00:00 98.5 72 18 118/61 (80) 97   


 


10/15/19 00:00  60      


 


10/14/19 21:00      Nasal Cannula 2.0 


 


10/14/19 20:40  65  116/44    


 


10/14/19 20:00  65      


 


10/14/19 20:00 98.5 64 18 116/44 (68) 98   


 


10/14/19 19:52  68 18  97 Nasal Cannula 2.0 28


 


10/14/19 19:52     97 Nasal Cannula 2.0 28


 


10/14/19 16:00 98.5 63 20 112/49 (70) 96   


 


10/14/19 16:00  64      

















Intake and Output  


 


 10/14/19 10/15/19





 19:00 07:00


 


Intake Total  110 ml


 


Output Total 400 ml 


 


Balance -400 ml 110 ml


 


  


 


IV Total  110 ml


 


Output Urine Total 400 ml 


 


# Bowel Movements 1 1











Laboratory Tests








Test


  10/14/19


22:17 10/15/19


02:00


 


Urine Color Binta   


 


Urine Appearance Cloudy   


 


Urine pH 6 (4.5-8.0)   


 


Urine Specific Gravity


  1.015


(1.005-1.035) 


 


 


Urine Protein


  2+ (NEGATIVE)


H 


 


 


Urine Glucose (UA)


  Negative


(NEGATIVE) 


 


 


Urine Ketones


  1+ (NEGATIVE)


H 


 


 


Urine Blood


  5+ (NEGATIVE)


H 


 


 


Urine Nitrite


  Negative


(NEGATIVE) 


 


 


Urine Bilirubin


  Negative


(NEGATIVE) 


 


 


Urine Ictotest


  Negative


(NEGATIVE) 


 


 


Urine Urobilinogen


  Normal MG/DL


(0.0-1.0) 


 


 


Urine Leukocyte Esterase


  1+ (NEGATIVE)


H 


 


 


Urine RBC


  Tntc /HPF (0 -


0)  H 


 


 


Urine WBC


  10-15 /HPF (0


- 0)  H 


 


 


Urine Squamous Epithelial


Cells None /LPF


(NONE/OCC) 


 


 


Urine Bacteria


  Few /HPF


(NONE) 


 


 


White Blood Count


  


  5.8 K/UL


(4.8-10.8)


 


Red Blood Count


  


  3.31 M/UL


(4.70-6.10)  L


 


Hemoglobin


  


  10.7 G/DL


(14.2-18.0)  L


 


Hematocrit


  


  32.7 %


(42.0-52.0)  L


 


Mean Corpuscular Volume  99 FL (80-99)  


 


Mean Corpuscular Hemoglobin


  


  32.4 PG


(27.0-31.0)  H


 


Mean Corpuscular Hemoglobin


Concent 


  32.8 G/DL


(32.0-36.0)


 


Red Cell Distribution Width


  


  12.1 %


(11.6-14.8)


 


Platelet Count


  


  276 K/UL


(150-450)


 


Mean Platelet Volume


  


  5.8 FL


(6.5-10.1)  L


 


Neutrophils (%) (Auto)


  


  61.6 %


(45.0-75.0)


 


Lymphocytes (%) (Auto)


  


  27.0 %


(20.0-45.0)


 


Monocytes (%) (Auto)


  


  8.1 %


(1.0-10.0)


 


Eosinophils (%) (Auto)


  


  2.1 %


(0.0-3.0)


 


Basophils (%) (Auto)


  


  1.2 %


(0.0-2.0)


 


Sodium Level


  


  138 MMOL/L


(136-145)


 


Potassium Level


  


  4.2 MMOL/L


(3.5-5.1)


 


Chloride Level


  


  104 MMOL/L


()


 


Carbon Dioxide Level


  


  33 MMOL/L


(21-32)  H


 


Anion Gap


  


  1 mmol/L


(5-15)  L


 


Blood Urea Nitrogen


  


  14 mg/dL


(7-18)


 


Creatinine


  


  0.7 MG/DL


(0.55-1.30)


 


Estimat Glomerular Filtration


Rate 


   mL/min (>60)  


 


 


Glucose Level


  


  93 MG/DL


()


 


Calcium Level


  


  8.7 MG/DL


(8.5-10.1)


 


Vancomycin Level Trough


  


  7.5 ug/mL


(5.0-12.0)











Microbiology








 Date/Time


Source Procedure


Growth Status


 


 


 10/14/19 22:17


Urine,Clean Catch Urine Culture - Preliminary


NO GROWTH AFTER 24 HOURS Resulted








Height (Feet):  5


Height (Inches):  2.00


Weight (Pounds):  110


Medications





Current Medications








 Medications


  (Trade)  Dose


 Ordered  Sig/Colleen


 Route


 PRN Reason  Start Time


 Stop Time Status Last Admin


Dose Admin


 


 Acetaminophen


  (Tylenol)  650 mg  Q4H  PRN


 ORAL


 FEVER  10/9/19 12:45


 11/8/19 12:44   


 


 


 Albuterol/


 Ipratropium


  (Albuterol/


 Ipratropium)  3 ml  Q4H  PRN


 HHN


 Shortness of Breath  10/12/19 12:45


 10/17/19 12:44  10/12/19 16:10


 


 


 Atorvastatin


 Calcium


  (Lipitor)  40 mg  BEDTIME


 ORAL


   10/10/19 21:00


 11/9/19 20:59  10/14/19 20:39


 


 


 Carvedilol


  (Coreg)  6.25 mg  EVERY 12  HOURS


 ORAL


   10/10/19 21:00


 11/9/19 20:59  10/15/19 09:22


 


 


 Cefepime HCl 2 gm/


 Dextrose  110 ml @ 


 220 mls/hr  DAILY


 IV


   10/10/19 09:00


 10/17/19 08:59  10/15/19 09:22


 


 


 Dextrose


  (Dextrose 50%)  25 ml  Q30M  PRN


 IV


 Hypoglycemia  10/9/19 12:45


 11/8/19 12:44   


 


 


 Dextrose


  (Dextrose 50%)  50 ml  Q30M  PRN


 IV


 Hypoglycemia  10/9/19 12:45


 11/8/19 12:44   


 


 


 Digoxin


  (Lanoxin)  0.125 mg  DAILY


 ORAL


   10/11/19 09:00


 11/10/19 08:59  10/15/19 09:22


 


 


 Donepezil HCl


  (Aricept)  5 mg  QHS


 ORAL


   10/12/19 21:00


 11/11/19 20:59  10/14/19 20:40


 


 


 Escitalopram


 Oxalate


  (Lexapro)  5 mg  DAILY


 ORAL


   10/13/19 09:00


 11/12/19 08:59  10/15/19 09:22


 


 


 Lisinopril


  (Zestril)  10 mg  DAILY


 ORAL


   10/12/19 09:00


 11/11/19 08:59  10/15/19 09:22


 


 


 Lorazepam


  (Ativan 2mg/ml


 1ml)  2 mg  Q2H  PRN


 IV


 For Anxiety  10/9/19 12:45


 10/16/19 12:44   


 


 


 Polyethylene


 Glycol


  (Miralax)  17 gm  DAILYPRN  PRN


 ORAL


 Constipation  10/9/19 12:45


 11/8/19 12:44   


 


 


 Promethazine HCl/


 Codeine


  (Phenergan with


 Codeine)  5 ml  Q4H  PRN


 ORAL


 For Cough  10/9/19 12:45


 11/8/19 12:44   


 


 


 Rivaroxaban


  (Xarelto)  15 mg  DAILY


 ORAL


   10/10/19 09:00


 11/9/19 08:59  10/15/19 09:22


 


 


 Tamsulosin HCl


  (Flomax)  0.4 mg  QHS


 ORAL


   10/9/19 21:00


 11/8/19 20:59  10/14/19 20:40


 


 


 Trazodone HCl


  (Desyrel)  25 mg  BEDTIME


 ORAL


   10/9/19 21:00


 11/8/19 20:59  10/14/19 20:40


 


 


 Vancomycin HCl


  (Vanco rx to


 dose)  1 ea  DAILY  PRN


 MISC


 Per rx protocol  10/9/19 13:45


 11/8/19 13:44   


 


 


 Vancomycin HCl 1


 gm/Dextrose  275 ml @ 


 183.708


 mls/hr  Q12H


 IVPB


   10/15/19 04:00


 10/20/19 03:59  10/15/19 04:20


 











Assessment/Plan


Assessment/Plan:


Assessment:


SEvere sepsis 2ry to PNA


   -10/14 CXR: Suspect slight worsening of left upper lobe infiltrate and some 

associated volume loss. The latter may in part be artifactual due to patient 

rotation, however.


    -CT chest:  Consolidation within the left upper lobe.  Bilateral patchy 

groundglass and interstitial infiltrates.  Findings could be related to 

infectious inflammatory infiltrate.  Posttreatment imaging recommended to 

ensure resolution and exclude underlying neoplastic process malignancy.  

Bilateral pleural effusions as well as ascites and subcutaneous 


edema anasarca..  Cardiomegaly.





Transient hypotension


    -CXR:  Left upper lobe consolidation most likely due to pneumonia


    -influenza sc, legionella ag urine neg


    -sp cx p





 Afebrile


Leukocytosis; SP


   -u/a wbc 15-20, nit neg, leuk +1; ucx Neg





Lactic acidosis, mild- resolved





Afib on AC


HTN


HLD


CHF


anemia


BPH 


schizophrenia


CAD/MI s/p CABG


 s/p AICD


 tobacco abuse


 cardiomyopathy EF 25-30%


[facility]


 


Plan:


-Continue empiric IV Vancomycin, Cefepime #7/10 pending sp cx


    -10/14 SP azithromycin #6


   -10/9 SP ZOsyn x1





-f/u cx


-Monitor CBC/CMP, temperatures


-f/u sp cx (still pending)


-aspiration precautions





Thank you for this consultation. Will continue to follow along with you.





Discussed with Opal Matias M.D. Oct 15, 2019 13:20

## 2019-10-15 NOTE — GENERAL PROGRESS NOTE
Assessment/Plan


Problem List:  


(1) UTI (urinary tract infection)


ICD Codes:  N39.0 - Urinary tract infection, site not specified


SNOMED:  37200121


Qualifiers:  


   Qualified Codes:  N39.0 - Urinary tract infection, site not specified


(2) Sepsis


ICD Codes:  A41.9 - Sepsis, unspecified organism


SNOMED:  82414578


Qualifiers:  


   Qualified Codes:  A41.9 - Sepsis, unspecified organism


(3) Pneumonia


ICD Codes:  J18.9 - Pneumonia, unspecified organism


SNOMED:  295105024


Qualifiers:  


   Qualified Codes:  J18.1 - Lobar pneumonia, unspecified organism


(4) Hypotension


ICD Codes:  I95.9 - Hypotension, unspecified


SNOMED:  20769237


(5) CHF (congestive heart failure)


ICD Codes:  I50.9 - Heart failure, unspecified


SNOMED:  51302232


(6) HTN (hypertension)


ICD Codes:  I10 - Essential (primary) hypertension


SNOMED:  86069355


Status:  stable, progressing, unchanged


Assessment/Plan:


o2 pulm tx abx pt diet cbc bmp am aru eval





Subjective


Constitutional:  Reports: weakness


Allergies:  


Coded Allergies:  


     No Known Allergies (Unverified , 10/9/19)


All Systems:  reviewed and negative except above


Subjective


o2nc calm in bed





Objective





Last 24 Hour Vital Signs








  Date Time  Temp Pulse Resp B/P (MAP) Pulse Ox O2 Delivery O2 Flow Rate FiO2


 


10/15/19 12:00 98.1 76 18 95/54 (68) 96   


 


10/15/19 12:00  62      


 


10/15/19 09:53  76      


 


10/15/19 09:22    114/55    


 


10/15/19 09:22  64      


 


10/15/19 09:22  64  114/55    


 


10/15/19 08:50     96 Nasal Cannula 2.0 28


 


10/15/19 08:49  64 20  96 Nasal Cannula 2.0 28


 


10/15/19 08:49      Nasal Cannula 2.0 


 


10/15/19 07:58 97.9 77 20 114/55 (74) 95   


 


10/15/19 04:00 98.4 63 18 122/61 (81) 100   


 


10/15/19 04:00  60      


 


10/15/19 00:00 98.5 72 18 118/61 (80) 97   


 


10/15/19 00:00  60      


 


10/14/19 21:00      Nasal Cannula 2.0 


 


10/14/19 20:40  65  116/44    


 


10/14/19 20:00  65      


 


10/14/19 20:00 98.5 64 18 116/44 (68) 98   


 


10/14/19 19:52  68 18  97 Nasal Cannula 2.0 28


 


10/14/19 19:52     97 Nasal Cannula 2.0 28


 


10/14/19 16:00 98.5 63 20 112/49 (70) 96   


 


10/14/19 16:00  64      

















Intake and Output  


 


 10/14/19 10/15/19





 19:00 07:00


 


Intake Total  110 ml


 


Output Total 400 ml 


 


Balance -400 ml 110 ml


 


  


 


IV Total  110 ml


 


Output Urine Total 400 ml 


 


# Bowel Movements 1 1








Laboratory Tests


10/14/19 22:17: 


Urine Color Binta, Urine Appearance Cloudy, Urine pH 6, Urine Specific Gravity 

1.015, Urine Protein 2+H, Urine Glucose (UA) Negative, Urine Ketones 1+H, Urine 

Blood 5+H, Urine Nitrite Negative, Urine Bilirubin Negative, Urine Ictotest 

Negative, Urine Urobilinogen Normal, Urine Leukocyte Esterase 1+H, Urine RBC 

TntcH, Urine WBC 10-15H, Urine Squamous Epithelial Cells None, Urine Bacteria 

Few


10/15/19 02:00: 


White Blood Count 5.8, Red Blood Count 3.31L, Hemoglobin 10.7L, Hematocrit 32.7L

, Mean Corpuscular Volume 99, Mean Corpuscular Hemoglobin 32.4H, Mean 

Corpuscular Hemoglobin Concent 32.8, Red Cell Distribution Width 12.1, Platelet 

Count 276, Mean Platelet Volume 5.8L, Neutrophils (%) (Auto) 61.6, Lymphocytes (

%) (Auto) 27.0, Monocytes (%) (Auto) 8.1, Eosinophils (%) (Auto) 2.1, Basophils 

(%) (Auto) 1.2, Sodium Level 138, Potassium Level 4.2, Chloride Level 104, 

Carbon Dioxide Level 33H, Anion Gap 1L, Blood Urea Nitrogen 14, Creatinine 0.7, 

Estimat Glomerular Filtration Rate , Glucose Level 93, Calcium Level 8.7, 

Vancomycin Level Trough 7.5


Height (Feet):  5


Height (Inches):  2.00


Weight (Pounds):  110


General Appearance:  lethargic


EENT:  normal ENT inspection


Neck:  normal alignment


Cardiovascular:  normal peripheral pulses, normal rate, regular rhythm


Respiratory/Chest:  chest wall non-tender, lungs clear, normal breath sounds


Abdomen:  normal bowel sounds, non tender, soft


Extremities:  normal inspection


Edema:  no edema noted Arm (L), no edema noted Arm (R), no edema noted Leg (L), 

no edema noted Leg (R), no edema noted Pedal (L), no edema noted Pedal (R), no 

edema noted Generalized


Neurologic:  motor weakness


Skin:  normal pigmentation, warm/dry











Timo Morejon DO Oct 15, 2019 13:30

## 2019-10-15 NOTE — PULMONOLOGY PROGRESS NOTE
Assessment/Plan


Problems:  


(1) Pneumonia


(2) COPD (chronic obstructive pulmonary disease)


(3) Atrial fibrillation


(4) CHF (congestive heart failure)


(5) ICD (implantable cardioverter-defibrillator) in place


(6) EF 25%


(7) CAD (coronary artery disease)


(8) BPH (benign prostatic hyperplasia)


(9) HTN (hypertension)


(10) Chronic anticoagulation


Assessment/Plan


wbc decreasing, back to normal 


continue abx


CT chest reviewed, extensive consolidation


all  culture results are negative


chest pt


legionella negative


cultures so far are all negative.


heart rate controlled





Subjective


ROS Limited/Unobtainable:  No


Constitutional:  Reports: no symptoms


HEENT:  Repors: no symptoms


Respiratory:  Reports: no symptoms


Allergies:  


Coded Allergies:  


     No Known Allergies (Unverified , 10/9/19)





Objective





Last 24 Hour Vital Signs








  Date Time  Temp Pulse Resp B/P (MAP) Pulse Ox O2 Delivery O2 Flow Rate FiO2


 


10/15/19 09:53  76      


 


10/15/19 09:22    114/55    


 


10/15/19 09:22  64      


 


10/15/19 09:22  64  114/55    


 


10/15/19 08:50     96 Nasal Cannula 2.0 28


 


10/15/19 08:49  64 20  96 Nasal Cannula 2.0 28


 


10/15/19 08:49      Nasal Cannula 2.0 


 


10/15/19 07:58 97.9 77 20 114/55 (74) 95   


 


10/15/19 04:00 98.4 63 18 122/61 (81) 100   


 


10/15/19 04:00  60      


 


10/15/19 00:00 98.5 72 18 118/61 (80) 97   


 


10/15/19 00:00  60      


 


10/14/19 21:00      Nasal Cannula 2.0 


 


10/14/19 20:40  65  116/44    


 


10/14/19 20:00  65      


 


10/14/19 20:00 98.5 64 18 116/44 (68) 98   


 


10/14/19 19:52  68 18  97 Nasal Cannula 2.0 28


 


10/14/19 19:52     97 Nasal Cannula 2.0 28


 


10/14/19 16:00 98.5 63 20 112/49 (70) 96   


 


10/14/19 16:00  64      


 


10/14/19 12:00 98.3 65 20 110/58 (75) 95   


 


10/14/19 12:00  72      

















Intake and Output  


 


 10/14/19 10/15/19





 19:00 07:00


 


Intake Total  110 ml


 


Output Total 400 ml 


 


Balance -400 ml 110 ml


 


  


 


IV Total  110 ml


 


Output Urine Total 400 ml 


 


# Bowel Movements 1 1








General Appearance:  cachetic


HEENT:  normocephalic, atraumatic


Respiratory/Chest:  chest wall non-tender, lungs clear


Cardiovascular:  normal peripheral pulses, normal rate


Abdomen:  normal bowel sounds, soft, non tender, non distended


Genitourinary:  normal external genitalia


Extremities:  no cyanosis


Skin:  no rash


Neurologic/Psychiatric:  CNs II-XII grossly normal


Lymphatic:  no neck adenopathy





Microbiology








 Date/Time


Source Procedure


Growth Status


 


 


 10/14/19 22:17


Urine,Clean Catch Urine Culture - Preliminary


NO GROWTH AFTER 24 HOURS Resulted








Laboratory Tests


10/14/19 22:17: 


Urine Color Binta, Urine Appearance Cloudy, Urine pH 6, Urine Specific Gravity 

1.015, Urine Protein 2+H, Urine Glucose (UA) Negative, Urine Ketones 1+H, Urine 

Blood 5+H, Urine Nitrite Negative, Urine Bilirubin Negative, Urine Ictotest 

Negative, Urine Urobilinogen Normal, Urine Leukocyte Esterase 1+H, Urine RBC 

TntcH, Urine WBC 10-15H, Urine Squamous Epithelial Cells None, Urine Bacteria 

Few


10/15/19 02:00: 


White Blood Count 5.8, Red Blood Count 3.31L, Hemoglobin 10.7L, Hematocrit 32.7L

, Mean Corpuscular Volume 99, Mean Corpuscular Hemoglobin 32.4H, Mean 

Corpuscular Hemoglobin Concent 32.8, Red Cell Distribution Width 12.1, Platelet 

Count 276, Mean Platelet Volume 5.8L, Neutrophils (%) (Auto) 61.6, Lymphocytes (

%) (Auto) 27.0, Monocytes (%) (Auto) 8.1, Eosinophils (%) (Auto) 2.1, Basophils 

(%) (Auto) 1.2, Sodium Level 138, Potassium Level 4.2, Chloride Level 104, 

Carbon Dioxide Level 33H, Anion Gap 1L, Blood Urea Nitrogen 14, Creatinine 0.7, 

Estimat Glomerular Filtration Rate , Glucose Level 93, Calcium Level 8.7, 

Vancomycin Level Trough 7.5





Current Medications








 Medications


  (Trade)  Dose


 Ordered  Sig/Colleen


 Route


 PRN Reason  Start Time


 Stop Time Status Last Admin


Dose Admin


 


 Acetaminophen


  (Tylenol)  650 mg  Q4H  PRN


 ORAL


 FEVER  10/9/19 12:45


 11/8/19 12:44   


 


 


 Albuterol/


 Ipratropium


  (Albuterol/


 Ipratropium)  3 ml  Q4H  PRN


 HHN


 Shortness of Breath  10/12/19 12:45


 10/17/19 12:44  10/12/19 16:10


 


 


 Atorvastatin


 Calcium


  (Lipitor)  40 mg  BEDTIME


 ORAL


   10/10/19 21:00


 11/9/19 20:59  10/14/19 20:39


 


 


 Carvedilol


  (Coreg)  6.25 mg  EVERY 12  HOURS


 ORAL


   10/10/19 21:00


 11/9/19 20:59  10/15/19 09:22


 


 


 Cefepime HCl 2 gm/


 Dextrose  110 ml @ 


 220 mls/hr  DAILY


 IV


   10/10/19 09:00


 10/17/19 08:59  10/15/19 09:22


 


 


 Dextrose


  (Dextrose 50%)  25 ml  Q30M  PRN


 IV


 Hypoglycemia  10/9/19 12:45


 11/8/19 12:44   


 


 


 Dextrose


  (Dextrose 50%)  50 ml  Q30M  PRN


 IV


 Hypoglycemia  10/9/19 12:45


 11/8/19 12:44   


 


 


 Digoxin


  (Lanoxin)  0.125 mg  DAILY


 ORAL


   10/11/19 09:00


 11/10/19 08:59  10/15/19 09:22


 


 


 Donepezil HCl


  (Aricept)  5 mg  QHS


 ORAL


   10/12/19 21:00


 11/11/19 20:59  10/14/19 20:40


 


 


 Escitalopram


 Oxalate


  (Lexapro)  5 mg  DAILY


 ORAL


   10/13/19 09:00


 11/12/19 08:59  10/15/19 09:22


 


 


 Lisinopril


  (Zestril)  10 mg  DAILY


 ORAL


   10/12/19 09:00


 11/11/19 08:59  10/15/19 09:22


 


 


 Lorazepam


  (Ativan 2mg/ml


 1ml)  2 mg  Q2H  PRN


 IV


 For Anxiety  10/9/19 12:45


 10/16/19 12:44   


 


 


 Polyethylene


 Glycol


  (Miralax)  17 gm  DAILYPRN  PRN


 ORAL


 Constipation  10/9/19 12:45


 11/8/19 12:44   


 


 


 Promethazine HCl/


 Codeine


  (Phenergan with


 Codeine)  5 ml  Q4H  PRN


 ORAL


 For Cough  10/9/19 12:45


 11/8/19 12:44   


 


 


 Rivaroxaban


  (Xarelto)  15 mg  DAILY


 ORAL


   10/10/19 09:00


 11/9/19 08:59  10/15/19 09:22


 


 


 Tamsulosin HCl


  (Flomax)  0.4 mg  QHS


 ORAL


   10/9/19 21:00


 11/8/19 20:59  10/14/19 20:40


 


 


 Trazodone HCl


  (Desyrel)  25 mg  BEDTIME


 ORAL


   10/9/19 21:00


 11/8/19 20:59  10/14/19 20:40


 


 


 Vancomycin HCl


  (Vanco rx to


 dose)  1 ea  DAILY  PRN


 MISC


 Per rx protocol  10/9/19 13:45


 11/8/19 13:44   


 


 


 Vancomycin HCl 1


 gm/Dextrose  275 ml @ 


 183.708


 mls/hr  Q12H


 IVPB


   10/15/19 04:00


 10/20/19 03:59  10/15/19 04:20


 

















Lucy Carroll MD Oct 15, 2019 11:52

## 2019-10-15 NOTE — CARDIOLOGY PROGRESS NOTE
Assessment/Plan


Assessment/Plan


coronary artery disease s/p cabg


ischemic cardiomyopathy, ef 25 to 30%


biventricular ICD [employer] device.


Afib on AC


HLD


cheronic systolic CHF


anemia


BPH 


schizophrenia


pneumonia 








bp is better 


he will get larg volume of ivf with each vancomycin 


on acei dose is on coreg agian 


at risk for chf 


cr looks ok 


cxr noted 











ct reprot noted 


1.  Consolidation within the left upper lobe.  Bilateral patchy 


groundglass and interstitial infiltrates.  Findings could be related to 


infectious inflammatory infiltrate.  Posttreatment imaging recommended to 


ensure resolution and exclude underlying neoplastic process malignancy.


2.  Bilateral pleural effusions as well as ascites and subcutaneous 


edema anasarca.


3.  Cardiomegaly





Subjective


Cardiovascular:  Denies: chest pain


Respiratory:  Denies: shortness of breath, SOB with excertion


Gastrointestinal/Abdominal:  Denies: abdominal pain


Genitourinary:  Denies: burning





Objective





Last 24 Hour Vital Signs








  Date Time  Temp Pulse Resp B/P (MAP) Pulse Ox O2 Delivery O2 Flow Rate FiO2


 


10/15/19 09:53  76      


 


10/15/19 09:22    114/55    


 


10/15/19 09:22  64      


 


10/15/19 09:22  64  114/55    


 


10/15/19 08:50     96 Nasal Cannula 2.0 28


 


10/15/19 08:49  64 20  96 Nasal Cannula 2.0 28


 


10/15/19 08:49      Nasal Cannula 2.0 


 


10/15/19 07:58 97.9 77 20 114/55 (74) 95   


 


10/15/19 04:00 98.4 63 18 122/61 (81) 100   


 


10/15/19 04:00  60      


 


10/15/19 00:00 98.5 72 18 118/61 (80) 97   


 


10/15/19 00:00  60      


 


10/14/19 21:00      Nasal Cannula 2.0 


 


10/14/19 20:40  65  116/44    


 


10/14/19 20:00  65      


 


10/14/19 20:00 98.5 64 18 116/44 (68) 98   


 


10/14/19 19:52  68 18  97 Nasal Cannula 2.0 28


 


10/14/19 19:52     97 Nasal Cannula 2.0 28


 


10/14/19 16:00 98.5 63 20 112/49 (70) 96   


 


10/14/19 16:00  64      


 


10/14/19 12:00 98.3 65 20 110/58 (75) 95   


 


10/14/19 12:00  72      








General Appearance:  no apparent distress, alert


Cardiovascular:  normal rate


Respiratory/Chest:  rhonchi - right


Abdomen:  normal bowel sounds, non tender, soft


Extremities:  no swelling











Intake and Output  


 


 10/14/19 10/15/19





 19:00 07:00


 


Intake Total  110 ml


 


Output Total 400 ml 


 


Balance -400 ml 110 ml


 


  


 


IV Total  110 ml


 


Output Urine Total 400 ml 


 


# Bowel Movements 1 1











Laboratory Tests








Test


  10/14/19


22:17 10/15/19


02:00


 


Urine Color Binta   


 


Urine Appearance Cloudy   


 


Urine pH 6 (4.5-8.0)   


 


Urine Specific Gravity


  1.015


(1.005-1.035) 


 


 


Urine Protein


  2+ (NEGATIVE)


H 


 


 


Urine Glucose (UA)


  Negative


(NEGATIVE) 


 


 


Urine Ketones


  1+ (NEGATIVE)


H 


 


 


Urine Blood


  5+ (NEGATIVE)


H 


 


 


Urine Nitrite


  Negative


(NEGATIVE) 


 


 


Urine Bilirubin


  Negative


(NEGATIVE) 


 


 


Urine Ictotest


  Negative


(NEGATIVE) 


 


 


Urine Urobilinogen


  Normal MG/DL


(0.0-1.0) 


 


 


Urine Leukocyte Esterase


  1+ (NEGATIVE)


H 


 


 


Urine RBC


  Tntc /HPF (0 -


0)  H 


 


 


Urine WBC


  10-15 /HPF (0


- 0)  H 


 


 


Urine Squamous Epithelial


Cells None /LPF


(NONE/OCC) 


 


 


Urine Bacteria


  Few /HPF


(NONE) 


 


 


White Blood Count


  


  5.8 K/UL


(4.8-10.8)


 


Red Blood Count


  


  3.31 M/UL


(4.70-6.10)  L


 


Hemoglobin


  


  10.7 G/DL


(14.2-18.0)  L


 


Hematocrit


  


  32.7 %


(42.0-52.0)  L


 


Mean Corpuscular Volume  99 FL (80-99)  


 


Mean Corpuscular Hemoglobin


  


  32.4 PG


(27.0-31.0)  H


 


Mean Corpuscular Hemoglobin


Concent 


  32.8 G/DL


(32.0-36.0)


 


Red Cell Distribution Width


  


  12.1 %


(11.6-14.8)


 


Platelet Count


  


  276 K/UL


(150-450)


 


Mean Platelet Volume


  


  5.8 FL


(6.5-10.1)  L


 


Neutrophils (%) (Auto)


  


  61.6 %


(45.0-75.0)


 


Lymphocytes (%) (Auto)


  


  27.0 %


(20.0-45.0)


 


Monocytes (%) (Auto)


  


  8.1 %


(1.0-10.0)


 


Eosinophils (%) (Auto)


  


  2.1 %


(0.0-3.0)


 


Basophils (%) (Auto)


  


  1.2 %


(0.0-2.0)


 


Sodium Level


  


  138 MMOL/L


(136-145)


 


Potassium Level


  


  4.2 MMOL/L


(3.5-5.1)


 


Chloride Level


  


  104 MMOL/L


()


 


Carbon Dioxide Level


  


  33 MMOL/L


(21-32)  H


 


Anion Gap


  


  1 mmol/L


(5-15)  L


 


Blood Urea Nitrogen


  


  14 mg/dL


(7-18)


 


Creatinine


  


  0.7 MG/DL


(0.55-1.30)


 


Estimat Glomerular Filtration


Rate 


   mL/min (>60)  


 


 


Glucose Level


  


  93 MG/DL


()


 


Calcium Level


  


  8.7 MG/DL


(8.5-10.1)


 


Vancomycin Level Trough


  


  7.5 ug/mL


(5.0-12.0)











Microbiology








 Date/Time


Source Procedure


Growth Status


 


 


 10/14/19 22:17


Urine,Clean Catch Urine Culture - Preliminary


NO GROWTH AFTER 24 HOURS Resulted

















Robert Ventura MD Oct 15, 2019 11:05

## 2019-10-16 VITALS — SYSTOLIC BLOOD PRESSURE: 102 MMHG | DIASTOLIC BLOOD PRESSURE: 53 MMHG

## 2019-10-16 VITALS — SYSTOLIC BLOOD PRESSURE: 129 MMHG | DIASTOLIC BLOOD PRESSURE: 66 MMHG

## 2019-10-16 VITALS — DIASTOLIC BLOOD PRESSURE: 55 MMHG | SYSTOLIC BLOOD PRESSURE: 119 MMHG

## 2019-10-16 VITALS — DIASTOLIC BLOOD PRESSURE: 52 MMHG | SYSTOLIC BLOOD PRESSURE: 114 MMHG

## 2019-10-16 VITALS — SYSTOLIC BLOOD PRESSURE: 124 MMHG | DIASTOLIC BLOOD PRESSURE: 58 MMHG

## 2019-10-16 VITALS — SYSTOLIC BLOOD PRESSURE: 128 MMHG | DIASTOLIC BLOOD PRESSURE: 57 MMHG

## 2019-10-16 LAB
ADD MANUAL DIFF: NO
ANION GAP SERPL CALC-SCNC: 0 MMOL/L (ref 5–15)
BASOPHILS NFR BLD AUTO: 0.7 % (ref 0–2)
BUN SERPL-MCNC: 11 MG/DL (ref 7–18)
CALCIUM SERPL-MCNC: 8.7 MG/DL (ref 8.5–10.1)
CHLORIDE SERPL-SCNC: 101 MMOL/L (ref 98–107)
CO2 SERPL-SCNC: 36 MMOL/L (ref 21–32)
CREAT SERPL-MCNC: 0.6 MG/DL (ref 0.55–1.3)
EOSINOPHIL NFR BLD AUTO: 2.4 % (ref 0–3)
ERYTHROCYTE [DISTWIDTH] IN BLOOD BY AUTOMATED COUNT: 12.1 % (ref 11.6–14.8)
HCT VFR BLD CALC: 32.5 % (ref 42–52)
HGB BLD-MCNC: 10.8 G/DL (ref 14.2–18)
LYMPHOCYTES NFR BLD AUTO: 37 % (ref 20–45)
MCV RBC AUTO: 99 FL (ref 80–99)
MONOCYTES NFR BLD AUTO: 5.5 % (ref 1–10)
NEUTROPHILS NFR BLD AUTO: 54.4 % (ref 45–75)
PLATELET # BLD: 310 K/UL (ref 150–450)
POTASSIUM SERPL-SCNC: 4.3 MMOL/L (ref 3.5–5.1)
RBC # BLD AUTO: 3.29 M/UL (ref 4.7–6.1)
SODIUM SERPL-SCNC: 137 MMOL/L (ref 136–145)
WBC # BLD AUTO: 5.2 K/UL (ref 4.8–10.8)

## 2019-10-16 RX ADMIN — TRAZODONE HYDROCHLORIDE SCH MG: 50 TABLET ORAL at 20:59

## 2019-10-16 RX ADMIN — DONEPEZIL HYDROCHLORIDE SCH MG: 5 TABLET, FILM COATED ORAL at 20:58

## 2019-10-16 RX ADMIN — LISINOPRIL SCH MG: 10 TABLET ORAL at 08:59

## 2019-10-16 RX ADMIN — SODIUM CHLORIDE SCH MLS/HR: 9 INJECTION, SOLUTION INTRAVENOUS at 04:40

## 2019-10-16 RX ADMIN — TAMSULOSIN HYDROCHLORIDE SCH MG: 0.4 CAPSULE ORAL at 20:59

## 2019-10-16 RX ADMIN — SODIUM CHLORIDE SCH MLS/HR: 0.9 INJECTION INTRAVENOUS at 08:59

## 2019-10-16 RX ADMIN — ATORVASTATIN CALCIUM SCH MG: 20 TABLET, FILM COATED ORAL at 20:59

## 2019-10-16 RX ADMIN — RIVAROXABAN SCH MG: 15 TABLET, FILM COATED ORAL at 08:58

## 2019-10-16 RX ADMIN — CARVEDILOL SCH MG: 6.25 TABLET, FILM COATED ORAL at 20:59

## 2019-10-16 RX ADMIN — CARVEDILOL SCH MG: 6.25 TABLET, FILM COATED ORAL at 08:58

## 2019-10-16 RX ADMIN — DIGOXIN SCH MG: 0.12 TABLET ORAL at 08:58

## 2019-10-16 NOTE — PULMONOLOGY PROGRESS NOTE
Assessment/Plan


Problems:  


(1) Pneumonia


(2) COPD (chronic obstructive pulmonary disease)


(3) EF 25%


(4) Atrial fibrillation


(5) ICD (implantable cardioverter-defibrillator) in place


(6) CAD (coronary artery disease)


(7) BPH (benign prostatic hyperplasia)


(8) HTN (hypertension)


(9) Chronic anticoagulation


Assessment/Plan


wbc decreasing, back to normal 


continue abx


CT chest reviewed, extensive consolidation


all  culture results are negative


chest pt


legionella negative


cultures so far are all negative.


heart rate controlled





doing better, dc planning soon





Subjective


ROS Limited/Unobtainable:  No


Constitutional:  Reports: no symptoms


HEENT:  Repors: no symptoms


Respiratory:  Reports: no symptoms


Allergies:  


Coded Allergies:  


     No Known Allergies (Unverified , 10/9/19)





Objective





Last 24 Hour Vital Signs








  Date Time  Temp Pulse Resp B/P (MAP) Pulse Ox O2 Delivery O2 Flow Rate FiO2


 


10/16/19 09:00      Nasal Cannula 2.0 


 


10/16/19 08:59    129/66    


 


10/16/19 08:58  69      


 


10/16/19 08:58  69  129/66    


 


10/16/19 08:03  69 18  97 Nasal Cannula 2.0 28


 


10/16/19 08:03     97 Nasal Cannula 2.0 28


 


10/16/19 08:00  66      


 


10/16/19 08:00 97.7 69 18 129/66 (87) 98   


 


10/16/19 04:00 98.3 61 18 128/57 (80) 97   


 


10/16/19 04:00  61      


 


10/16/19 00:00  62      


 


10/16/19 00:00 98.4 64 17 119/55 (76) 97   


 


10/15/19 21:05  63  129/59    


 


10/15/19 21:00      Nasal Cannula 2.0 


 


10/15/19 20:04  65 18  97 Nasal Cannula 2.0 28


 


10/15/19 20:04     97 Nasal Cannula 2.0 28


 


10/15/19 20:00 97.7 63 18 129/59 (82) 98   


 


10/15/19 20:00  60      


 


10/15/19 16:00 98.2 74 20 106/57 (73) 97   


 


10/15/19 16:00  66      

















Intake and Output  


 


 10/15/19 10/16/19





 18:59 06:59


 


Intake Total 600 ml 


 


Balance 600 ml 


 


  


 


Intake Oral 600 ml 


 


# Voids 4 


 


# Bowel Movements 2 1








General Appearance:  cachetic


HEENT:  normocephalic, atraumatic


Respiratory/Chest:  chest wall non-tender, lungs clear


Cardiovascular:  normal peripheral pulses, normal rate


Abdomen:  normal bowel sounds


Genitourinary:  normal external genitalia


Extremities:  no cyanosis


Neurologic/Psychiatric:  CNs II-XII grossly normal





Microbiology








 Date/Time


Source Procedure


Growth Status


 


 


 10/14/19 16:00


Sputum Induced Gram Stain - Final Resulted


 


 10/14/19 16:00 Sputum Culture - Preliminary


YEAST Resulted


 


 10/14/19 22:17


Urine,Clean Catch Urine Culture - Final


NO GROWTH AFTER 48 HOURS Complete








Laboratory Tests


10/16/19 05:56: 


White Blood Count 5.2, Red Blood Count 3.29L, Hemoglobin 10.8L, Hematocrit 32.5L

, Mean Corpuscular Volume 99, Mean Corpuscular Hemoglobin 32.8H, Mean 

Corpuscular Hemoglobin Concent 33.2, Red Cell Distribution Width 12.1, Platelet 

Count 310, Mean Platelet Volume 5.6L, Neutrophils (%) (Auto) 54.4, Lymphocytes (

%) (Auto) 37.0, Monocytes (%) (Auto) 5.5, Eosinophils (%) (Auto) 2.4, Basophils 

(%) (Auto) 0.7, Sodium Level 137, Potassium Level 4.3, Chloride Level 101, 

Carbon Dioxide Level 36H, Anion Gap 0L, Blood Urea Nitrogen 11, Creatinine 0.6, 

Estimat Glomerular Filtration Rate , Glucose Level 82, Calcium Level 8.7





Current Medications








 Medications


  (Trade)  Dose


 Ordered  Sig/Colleen


 Route


 PRN Reason  Start Time


 Stop Time Status Last Admin


Dose Admin


 


 Acetaminophen


  (Tylenol)  650 mg  Q4H  PRN


 ORAL


 FEVER  10/9/19 12:45


 11/8/19 12:44   


 


 


 Albuterol/


 Ipratropium


  (Albuterol/


 Ipratropium)  3 ml  Q4H  PRN


 HHN


 Shortness of Breath  10/12/19 12:45


 10/17/19 12:44  10/12/19 16:10


 


 


 Atorvastatin


 Calcium


  (Lipitor)  40 mg  BEDTIME


 ORAL


   10/10/19 21:00


 11/9/19 20:59  10/15/19 21:03


 


 


 Carvedilol


  (Coreg)  6.25 mg  EVERY 12  HOURS


 ORAL


   10/10/19 21:00


 11/9/19 20:59  10/16/19 08:58


 


 


 Cefepime HCl 2 gm/


 Dextrose  110 ml @ 


 220 mls/hr  DAILY


 IV


   10/10/19 09:00


 10/18/19 08:59  10/16/19 08:59


 


 


 Dextrose


  (Dextrose 50%)  25 ml  Q30M  PRN


 IV


 Hypoglycemia  10/9/19 12:45


 11/8/19 12:44   


 


 


 Dextrose


  (Dextrose 50%)  50 ml  Q30M  PRN


 IV


 Hypoglycemia  10/9/19 12:45


 11/8/19 12:44   


 


 


 Digoxin


  (Lanoxin)  0.125 mg  DAILY


 ORAL


   10/11/19 09:00


 11/10/19 08:59  10/16/19 08:58


 


 


 Donepezil HCl


  (Aricept)  5 mg  QHS


 ORAL


   10/12/19 21:00


 11/11/19 20:59  10/15/19 21:03


 


 


 Escitalopram


 Oxalate


  (Lexapro)  5 mg  DAILY


 ORAL


   10/13/19 09:00


 11/12/19 08:59  10/16/19 08:58


 


 


 Lisinopril


  (Zestril)  10 mg  DAILY


 ORAL


   10/12/19 09:00


 11/11/19 08:59  10/16/19 08:59


 


 


 Loperamide HCl


  (Imodium)  2 mg  QID  PRN


 ORAL


 Diarrhea  10/16/19 09:15


 11/15/19 09:14   


 


 


 Lorazepam


  (Ativan 2mg/ml


 1ml)  2 mg  Q2H  PRN


 IV


 For Anxiety  10/9/19 12:45


 10/16/19 12:44   


 


 


 Promethazine HCl/


 Codeine


  (Phenergan with


 Codeine)  5 ml  Q4H  PRN


 ORAL


 For Cough  10/9/19 12:45


 11/8/19 12:44   


 


 


 Rivaroxaban


  (Xarelto)  15 mg  DAILY


 ORAL


   10/10/19 09:00


 11/9/19 08:59  10/16/19 08:58


 


 


 Tamsulosin HCl


  (Flomax)  0.4 mg  QHS


 ORAL


   10/9/19 21:00


 11/8/19 20:59  10/15/19 21:03


 


 


 Trazodone HCl


  (Desyrel)  25 mg  BEDTIME


 ORAL


   10/9/19 21:00


 11/8/19 20:59  10/15/19 21:03


 

















Lucy Carroll MD Oct 16, 2019 12:05

## 2019-10-16 NOTE — GENERAL PROGRESS NOTE
Assessment/Plan


Problem List:  


(1) UTI (urinary tract infection)


ICD Codes:  N39.0 - Urinary tract infection, site not specified


SNOMED:  01759701


Qualifiers:  


   Qualified Codes:  N39.0 - Urinary tract infection, site not specified


(2) Sepsis


ICD Codes:  A41.9 - Sepsis, unspecified organism


SNOMED:  94234123


Qualifiers:  


   Qualified Codes:  A41.9 - Sepsis, unspecified organism


(3) Pneumonia


ICD Codes:  J18.9 - Pneumonia, unspecified organism


SNOMED:  666376851


Qualifiers:  


   Qualified Codes:  J18.1 - Lobar pneumonia, unspecified organism


(4) Hypotension


ICD Codes:  I95.9 - Hypotension, unspecified


SNOMED:  14165524


(5) CHF (congestive heart failure)


ICD Codes:  I50.9 - Heart failure, unspecified


SNOMED:  93852547


(6) HTN (hypertension)


ICD Codes:  I10 - Essential (primary) hypertension


SNOMED:  93305007


Status:  stable, progressing, unchanged


Assessment/Plan:


o2 pulm tx abx pt diet cbc bmp am aru eval





Subjective


Constitutional:  Reports: weakness


Respiratory:  Reports: shortness of breath


Allergies:  


Coded Allergies:  


     No Known Allergies (Unverified , 10/9/19)


All Systems:  reviewed and negative except above


Subjective


o2nc calm in bed





Objective





Last 24 Hour Vital Signs








  Date Time  Temp Pulse Resp B/P (MAP) Pulse Ox O2 Delivery O2 Flow Rate FiO2


 


10/16/19 08:59    129/66    


 


10/16/19 08:58  69      


 


10/16/19 08:58  69  129/66    


 


10/16/19 08:00 97.7 69 18 129/66 (87) 98   


 


10/16/19 04:00 98.3 61 18 128/57 (80) 97   


 


10/16/19 04:00  61      


 


10/16/19 00:00  62      


 


10/16/19 00:00 98.4 64 17 119/55 (76) 97   


 


10/15/19 21:05  63  129/59    


 


10/15/19 21:00      Nasal Cannula 2.0 


 


10/15/19 20:04  65 18  97 Nasal Cannula 2.0 28


 


10/15/19 20:04     97 Nasal Cannula 2.0 28


 


10/15/19 20:00 97.7 63 18 129/59 (82) 98   


 


10/15/19 20:00  60      


 


10/15/19 16:00 98.2 74 20 106/57 (73) 97   


 


10/15/19 16:00  66      


 


10/15/19 12:00 98.1 76 18 95/54 (68) 96   


 


10/15/19 12:00  62      


 


10/15/19 09:53  76      

















Intake and Output  


 


 10/15/19 10/16/19





 18:59 06:59


 


Intake Total 600 ml 


 


Balance 600 ml 


 


  


 


Intake Oral 600 ml 


 


# Voids 4 


 


# Bowel Movements 2 1








Laboratory Tests


10/16/19 05:56: 


White Blood Count 5.2, Red Blood Count 3.29L, Hemoglobin 10.8L, Hematocrit 32.5L

, Mean Corpuscular Volume 99, Mean Corpuscular Hemoglobin 32.8H, Mean 

Corpuscular Hemoglobin Concent 33.2, Red Cell Distribution Width 12.1, Platelet 

Count 310, Mean Platelet Volume 5.6L, Neutrophils (%) (Auto) 54.4, Lymphocytes (

%) (Auto) 37.0, Monocytes (%) (Auto) 5.5, Eosinophils (%) (Auto) 2.4, Basophils 

(%) (Auto) 0.7, Sodium Level 137, Potassium Level 4.3, Chloride Level 101, 

Carbon Dioxide Level 36H, Anion Gap 0L, Blood Urea Nitrogen 11, Creatinine 0.6, 

Estimat Glomerular Filtration Rate , Glucose Level 82, Calcium Level 8.7


Height (Feet):  5


Height (Inches):  2.00


Weight (Pounds):  112


General Appearance:  lethargic


EENT:  normal ENT inspection


Neck:  normal alignment


Cardiovascular:  normal peripheral pulses, normal rate, regular rhythm


Respiratory/Chest:  chest wall non-tender, lungs clear, normal breath sounds


Abdomen:  normal bowel sounds, non tender, soft


Extremities:  normal inspection


Edema:  no edema noted Arm (L), no edema noted Arm (R), no edema noted Leg (L), 

no edema noted Leg (R), no edema noted Pedal (L), no edema noted Pedal (R), no 

edema noted Generalized


Neurologic:  responsive, motor weakness


Skin:  normal pigmentation, warm/dry











Timo Morejon DO Oct 16, 2019 09:31

## 2019-10-16 NOTE — DIAGNOSTIC IMAGING REPORT
--------------- APPROVED REPORT --------------





CPT Code: 74063



Present Symptoms







BILATERAL: Imaging reveals a patent deep venous system bilaterally. There is no evidence 

of thrombus within the common femoral, superficial femoral, popliteal or tibial segments. 

The greater saphenous veins are within normal limits. Doppler indicates normal 

spontaneous flow within these segments.

## 2019-10-16 NOTE — PROGRESS NOTE
DATE:  10/16/2019

SUBJECTIVE:  This is an 83-year-old male patient with shortness of breath,

generalized weakness, confusion, disorganized thought process, decline in

cognition below his baseline.  He has got mood lability, confusion, and

agitation that is why daily psychiatric consultation requested.  Also,

cognition has declined below his baseline.



MENTAL STATUS EXAMINATION:  This is an 83-year-old male.  Appearance is

disheveled.  Attitude, irritable and agitated.  Affect, guarded and

restricted.  Intellect poor.  Mood, depressed and anxious.  Motor

activity, psychomotor agitation.  Attention span is poor.  Orientation x2.

Speech is low volume, slurred.  Thought process, disorganized and

illogical.  Insight and judgment is poor.



DIAGNOSIS:  Major depressive disorder, mild, recurrent with psychotic

features rule out dementia with psychosis.



PLAN:  Aricept 5 mg at bedtime, Celexa 10 mg a day, ______ , Ativan 2 mg

every 2 hours p.r.n. anxiety and agitation.  A 20 minutes of cognitive

behavioral therapy to help him identify his automatic negative thoughts

and convert negative thoughts to more positive thoughts to reduce

depression, anxiety, mood lability.   Chart reviewed and discussed with

staff.  Seen and assessed in his room.









  ______________________________________________

  Vero Andrea M.D.





DR:  Prosper

D:  10/16/2019 14:59

T:  10/16/2019 17:52

JOB#:  [id number]

CC:

## 2019-10-16 NOTE — NUR
NURSE NOTES: Contact with Dr. Ventura regarding patient's having 7 beats of V-tach while 
nurse changed his linen, but he's back to V-paced right after. No new orders at this time 
per doctor's request. Will continue to monitor.

## 2019-10-16 NOTE — DIAGNOSTIC IMAGING REPORT
--------------- APPROVED REPORT --------------





CPT Code: 77106



Present Symptoms

Comments: Edema





BILATERAL UPPER EXTREMITY: Imaging reveals patency of the internal jugular, subclavian, 

axillary and brachial veins. The cephalic and basilic veins are also patent.  Doppler 

indicates normal spontaneous flow within these venous segments, bilaterally.

## 2019-10-16 NOTE — NUR
HAND-OFF: 

Report given to MANUEL Gordon RN. Patient in bed AAO X4 with no S/S of distress. Endorsed plan of 
care.

## 2019-10-16 NOTE — CARDIOLOGY PROGRESS NOTE
Assessment/Plan


Assessment/Plan


coronary artery disease s/p cabg


ischemic cardiomyopathy, ef 25 to 30%


biventricular ICD [employer] device.


Afib on AC


HLD


chronic systolic CHF


anemia


BPH 


schizophrenia


pneumonia 








bp is better 


he will get larg volume of ivf with each vancomycin 


on acei dose will increase dose  is on coreg agian 


at risk for chf 


cr looks ok 


cxr noted 


telel nsvt afib v pacing 


ha icd 











ct reprot noted 


1.  Consolidation within the left upper lobe.  Bilateral patchy 


groundglass and interstitial infiltrates.  Findings could be related to 


infectious inflammatory infiltrate.  Posttreatment imaging recommended to 


ensure resolution and exclude underlying neoplastic process malignancy.


2.  Bilateral pleural effusions as well as ascites and subcutaneous 


edema anasarca.


3.  Cardiomegaly





Subjective


Cardiovascular:  Denies: chest pain, lightheadedness, palpitations


Respiratory:  Denies: shortness of breath


Gastrointestinal/Abdominal:  Denies: abdominal pain, constipated


Genitourinary:  Denies: burning





Objective





Last 24 Hour Vital Signs








  Date Time  Temp Pulse Resp B/P (MAP) Pulse Ox O2 Delivery O2 Flow Rate FiO2


 


10/16/19 16:00 97.3 60 20 102/53 (69) 96   


 


10/16/19 16:00  60      


 


10/16/19 12:00  55      


 


10/16/19 12:00 97.5 67 20 124/58 (80) 98   


 


10/16/19 09:00      Nasal Cannula 2.0 


 


10/16/19 08:59    129/66    


 


10/16/19 08:58  69      


 


10/16/19 08:58  69  129/66    


 


10/16/19 08:03  69 18  97 Nasal Cannula 2.0 28


 


10/16/19 08:03     97 Nasal Cannula 2.0 28


 


10/16/19 08:00  66      


 


10/16/19 08:00 97.7 69 18 129/66 (87) 98   


 


10/16/19 04:00 98.3 61 18 128/57 (80) 97   


 


10/16/19 04:00  61      


 


10/16/19 00:00  62      


 


10/16/19 00:00 98.4 64 17 119/55 (76) 97   


 


10/15/19 21:05  63  129/59    


 


10/15/19 21:00      Nasal Cannula 2.0 


 


10/15/19 20:04  65 18  97 Nasal Cannula 2.0 28


 


10/15/19 20:04     97 Nasal Cannula 2.0 28


 


10/15/19 20:00 97.7 63 18 129/59 (82) 98   


 


10/15/19 20:00  60      








General Appearance:  alert


Neck:  supple


Cardiovascular:  regular rhythm


Respiratory/Chest:  crackles/rales


Abdomen:  normal bowel sounds, non tender, soft


Extremities:  no swelling











Intake and Output  


 


 10/15/19 10/16/19





 19:00 07:00


 


Intake Total 600 ml 


 


Balance 600 ml 


 


  


 


Intake Oral 600 ml 


 


# Voids 4 


 


# Bowel Movements 2 1











Laboratory Tests








Test


  10/16/19


05:56


 


White Blood Count


  5.2 K/UL


(4.8-10.8)


 


Red Blood Count


  3.29 M/UL


(4.70-6.10)  L


 


Hemoglobin


  10.8 G/DL


(14.2-18.0)  L


 


Hematocrit


  32.5 %


(42.0-52.0)  L


 


Mean Corpuscular Volume 99 FL (80-99)  


 


Mean Corpuscular Hemoglobin


  32.8 PG


(27.0-31.0)  H


 


Mean Corpuscular Hemoglobin


Concent 33.2 G/DL


(32.0-36.0)


 


Red Cell Distribution Width


  12.1 %


(11.6-14.8)


 


Platelet Count


  310 K/UL


(150-450)


 


Mean Platelet Volume


  5.6 FL


(6.5-10.1)  L


 


Neutrophils (%) (Auto)


  54.4 %


(45.0-75.0)


 


Lymphocytes (%) (Auto)


  37.0 %


(20.0-45.0)


 


Monocytes (%) (Auto)


  5.5 %


(1.0-10.0)


 


Eosinophils (%) (Auto)


  2.4 %


(0.0-3.0)


 


Basophils (%) (Auto)


  0.7 %


(0.0-2.0)


 


Sodium Level


  137 MMOL/L


(136-145)


 


Potassium Level


  4.3 MMOL/L


(3.5-5.1)


 


Chloride Level


  101 MMOL/L


()


 


Carbon Dioxide Level


  36 MMOL/L


(21-32)  H


 


Anion Gap


  0 mmol/L


(5-15)  L


 


Blood Urea Nitrogen


  11 mg/dL


(7-18)


 


Creatinine


  0.6 MG/DL


(0.55-1.30)


 


Estimat Glomerular Filtration


Rate  mL/min (>60)  


 


 


Glucose Level


  82 MG/DL


()


 


Calcium Level


  8.7 MG/DL


(8.5-10.1)











Microbiology








 Date/Time


Source Procedure


Growth Status


 


 


 10/14/19 16:00


Sputum Induced Gram Stain - Final Resulted


 


 10/14/19 16:00 Sputum Culture - Preliminary


YEAST Resulted


 


 10/14/19 22:17


Urine,Clean Catch Urine Culture - Final


NO GROWTH AFTER 48 HOURS Complete

















Robert Ventura MD Oct 16, 2019 20:01

## 2019-10-16 NOTE — NUR
NURSE NOTES: Nurse report given by RAHEEL Jefferson. Patient's awake in bed eating breakfast. AO x 
4, denies pain, no s/s of distress or SOB. Bed low and locked, call light within reach, side 
rails x 2, safety precaution is on. IV is saline locked, patent and asymptomatic. Will 
continue to monitor.

## 2019-10-16 NOTE — NUR
NURSE NOTES:Nurse noticed the discrepancies on the Intake and Outtake record from ABIEL Ibarra. Patient ate breakfast, lunch and dinner with the nurse and the wife was at bedside. 
Nurse took the trays away, CNA wasn't there, however she documented patient refused meals. 
It was incorrect.

## 2019-10-16 NOTE — NUR
NURSE NOTES:

Received report from JOSE Wasserman RN.patient in bed AAO X3-4 with no complaints of acute pain 
or discomfort at this time. Kept clean, dry, and comfortable in bed. IV line intact and 
patent SL. Placed on continuous cardiac monitoring per protocol. On bedrest for weakness. 
Safety precaution in place; siderails X3 up, call light within reach, bed in lowest 
position, brakes and alarm on at all times. Needs and wants anticipated and attended, will 
continue plan of care and monitor for any changes.



Collect stool for C-diff toxin screen for LBM X3 days

-------------------------------------------------------------------------------

Addendum: 10/16/19 at 2143 by HECTOR ARIAS RN

-------------------------------------------------------------------------------

Received report from MANUEL Gordon RN

## 2019-10-16 NOTE — CONSULTATION
DATE OF CONSULTATION:  10/16/2019

CONSULTING PHYSICIAN:  Scooby Conklin M.D.



CHIEF COMPLAINT:  Diarrhea.



HISTORY OF PRESENT ILLNESS:  The patient is an 83-year-old nursing home

patient with numerous medical problems, which I will dictate in a second,

who was admitted to the hospital mainly for shortness of breath and cough

and was found to have a pneumonia.  Apparently, the patient is also having

diarrhea, so GI consult was requested for evaluation.



PAST MEDICAL HISTORY:

1. Atrial fibrillation.

2. Hypertension.

3. Coronary artery disease and MI.

4. History of CABG.

5. AICD placement.

6. History of tobacco abuse.

7. History of cardiomegaly with EF of 25 to 30%.

8. CHF.

9. Anemia.

10. BPH.

11. Schizophrenia.



ALLERGIES:  No known drug allergies.



MEDICATIONS:  Please see the long list of medication list.  The patient

currently on Eliquis.



FAMILY HISTORY:  Noncontributory.



SOCIAL HISTORY:  Currently living in the nursing home.  The patient had a

history of tobacco use and remote history of alcohol usage.  No IV drug

abuse.



FAMILY HISTORY:  Noncontributory.



PAST SURGICAL HISTORY:  CABG.



PHYSICAL EXAMINATION:

VITAL SIGNS:  Temperature is 97.7, pulse is 69, respirations 20, blood

pressure is 129/66.

HEENT:  Normocephalic and atraumatic.  Mild pale conjunctivae.

NECK:  Supple.  No evidence of obvious lymphadenopathy.

CARDIOVASCULAR:  Regular rate and rhythm.  Plus S1 and S2.  There is a

murmur in the left sternal border.  The patient had an AICD placed.

LUNGS:  Decreased breath sounds bilaterally, right more than left.

ABDOMEN:  Soft and nontender.  No rebound.  No guarding.  No peritoneal

sign.

EXTREMITIES:  No cyanosis.  No clubbing.  No edema

NEUROLOGIC:  Nonfocal.



LABORATORY DATA:  White count 12.2, hemoglobin 10.8, hematocrit 32,

platelet count is 310.



ASSESSMENT AND PLAN:  This is an 83-year-old male with numerous medical

problems as dictated above.  GI consult was de to diarrhea and anemia.  In

terms of diarrhea, the patient is currently MiraLAX, which we are going to

discontinue.  The patient is getting a lot of antibiotics and also nursing

home patient, so that prompts him for C. difficile, so we are going to

send the stool for C. difficile culture.  The patient is currently getting

Imodium p.r.n.  We will follow up.  Colonoscopy on hold given pneumonia

and the patient on Eliquis unless needed.



In terms of anemia, we will do anemia workup.  Most probably anemia of

chronic disease given normocytic in nature.  We will order iron panel,

B12, folate, CEA, stool OB.  The patient at one point might benefit from

endoscopy and colonoscopy, but again right now he has active pneumonia and

also on Eliquis.  We will follow.



I want to thank, Dr. Timo Morejon, for this kind referral.









  ______________________________________________

  Scooby Conklin M.D.





DR:  SCOTT

D:  10/16/2019 10:12

T:  10/16/2019 15:49

JOB#:  [id number]

CC:  Timo Morejon D.O.

## 2019-10-16 NOTE — INFECTIOUS DISEASES PROG NOTE
Assessment/Plan


Assessment/Plan





Assessment:


SEvere sepsis 2ry to PNA


   -10/14 CXR: Suspect slight worsening of left upper lobe infiltrate and some 

associated volume loss. The latter may in part be artifactual due to patient 

rotation, however.


    -CT chest:  Consolidation within the left upper lobe.  Bilateral patchy 

groundglass and interstitial infiltrates.  Findings could be related to 

infectious inflammatory infiltrate.  Posttreatment imaging recommended to 

ensure resolution and exclude underlying neoplastic process malignancy.  

Bilateral pleural effusions as well as ascites and subcutaneous 


edema anasarca..  Cardiomegaly.





Transient hypotension


    -CXR:  Left upper lobe consolidation most likely due to pneumonia


    -influenza sc, legionella ag urine neg


    -sp cx yeast (prelim)





 Afebrile


Leukocytosis; SP


   -u/a wbc 15-20, nit neg, leuk +1; ucx Neg





Lactic acidosis, mild- resolved





Afib on AC


HTN


HLD


CHF


anemia


BPH 


schizophrenia


CAD/MI s/p CABG


 s/p AICD


 tobacco abuse


 cardiomyopathy EF 25-30%


[facility] resident


 


Plan:


-D/c empiric IV Vancomycin #8


-Continue empiric Cefepime #8/10 pending sp cx


    -10/14 SP azithromycin #6


   -10/9 SP ZOsyn x1





-f/u cx


-Monitor CBC/CMP, temperatures


-f/u sp cx


-aspiration precautions





Thank you for this consultation. Will continue to follow along with you.





Discussed with RN





Subjective


Allergies:  


Coded Allergies:  


     No Known Allergies (Unverified , 10/9/19)


Subjective


afebrile


no leukocytosis





Objective


Vital Signs





Last 24 Hour Vital Signs








  Date Time  Temp Pulse Resp B/P (MAP) Pulse Ox O2 Delivery O2 Flow Rate FiO2


 


10/16/19 09:00      Nasal Cannula 2.0 


 


10/16/19 08:59    129/66    


 


10/16/19 08:58  69      


 


10/16/19 08:58  69  129/66    


 


10/16/19 08:03  69 18  97 Nasal Cannula 2.0 28


 


10/16/19 08:03     97 Nasal Cannula 2.0 28


 


10/16/19 08:00  66      


 


10/16/19 08:00 97.7 69 18 129/66 (87) 98   


 


10/16/19 04:00 98.3 61 18 128/57 (80) 97   


 


10/16/19 04:00  61      


 


10/16/19 00:00  62      


 


10/16/19 00:00 98.4 64 17 119/55 (76) 97   


 


10/15/19 21:05  63  129/59    


 


10/15/19 21:00      Nasal Cannula 2.0 


 


10/15/19 20:04  65 18  97 Nasal Cannula 2.0 28


 


10/15/19 20:04     97 Nasal Cannula 2.0 28


 


10/15/19 20:00 97.7 63 18 129/59 (82) 98   


 


10/15/19 20:00  60      


 


10/15/19 16:00 98.2 74 20 106/57 (73) 97   


 


10/15/19 16:00  66      


 


10/15/19 12:00 98.1 76 18 95/54 (68) 96   


 


10/15/19 12:00  62      








Height (Feet):  5


Height (Inches):  2.00


Weight (Pounds):  112


Objective


GENERAL:  A thin elderly appearing  male, in no acute


distress.


HEENT:  Normocephalic and atraumatic.  Bitemporal wasting.  Pupils are


equal, round, and reactive to light.  Sclerae anicteric.  Oral mucosa are


moist.


NECK:  Supple.  There is no jugular venous distention.


LUNGS:  Left rales.


HEART:  Regular S1, S2.  No murmur or S3.


ABDOMEN:  Soft and nontender.  No palpable mass.


EXTREMITIES:  No cyanosis, clubbing, or edema.





Microbiology








 Date/Time


Source Procedure


Growth Status


 


 


 10/14/19 16:00


Sputum Induced Gram Stain - Final Resulted


 


 10/14/19 16:00 Sputum Culture - Preliminary


YEAST Resulted


 


 10/14/19 22:17


Urine,Clean Catch Urine Culture - Final


NO GROWTH AFTER 48 HOURS Complete











Laboratory Tests








Test


  10/16/19


05:56


 


White Blood Count


  5.2 K/UL


(4.8-10.8)


 


Red Blood Count


  3.29 M/UL


(4.70-6.10)  L


 


Hemoglobin


  10.8 G/DL


(14.2-18.0)  L


 


Hematocrit


  32.5 %


(42.0-52.0)  L


 


Mean Corpuscular Volume 99 FL (80-99)  


 


Mean Corpuscular Hemoglobin


  32.8 PG


(27.0-31.0)  H


 


Mean Corpuscular Hemoglobin


Concent 33.2 G/DL


(32.0-36.0)


 


Red Cell Distribution Width


  12.1 %


(11.6-14.8)


 


Platelet Count


  310 K/UL


(150-450)


 


Mean Platelet Volume


  5.6 FL


(6.5-10.1)  L


 


Neutrophils (%) (Auto)


  54.4 %


(45.0-75.0)


 


Lymphocytes (%) (Auto)


  37.0 %


(20.0-45.0)


 


Monocytes (%) (Auto)


  5.5 %


(1.0-10.0)


 


Eosinophils (%) (Auto)


  2.4 %


(0.0-3.0)


 


Basophils (%) (Auto)


  0.7 %


(0.0-2.0)


 


Sodium Level


  137 MMOL/L


(136-145)


 


Potassium Level


  4.3 MMOL/L


(3.5-5.1)


 


Chloride Level


  101 MMOL/L


()


 


Carbon Dioxide Level


  36 MMOL/L


(21-32)  H


 


Anion Gap


  0 mmol/L


(5-15)  L


 


Blood Urea Nitrogen


  11 mg/dL


(7-18)


 


Creatinine


  0.6 MG/DL


(0.55-1.30)


 


Estimat Glomerular Filtration


Rate  mL/min (>60)  


 


 


Glucose Level


  82 MG/DL


()


 


Calcium Level


  8.7 MG/DL


(8.5-10.1)











Current Medications








 Medications


  (Trade)  Dose


 Ordered  Sig/Colleen


 Route


 PRN Reason  Start Time


 Stop Time Status Last Admin


Dose Admin


 


 Acetaminophen


  (Tylenol)  650 mg  Q4H  PRN


 ORAL


 FEVER  10/9/19 12:45


 11/8/19 12:44   


 


 


 Albuterol/


 Ipratropium


  (Albuterol/


 Ipratropium)  3 ml  Q4H  PRN


 HHN


 Shortness of Breath  10/12/19 12:45


 10/17/19 12:44  10/12/19 16:10


 


 


 Atorvastatin


 Calcium


  (Lipitor)  40 mg  BEDTIME


 ORAL


   10/10/19 21:00


 11/9/19 20:59  10/15/19 21:03


 


 


 Carvedilol


  (Coreg)  6.25 mg  EVERY 12  HOURS


 ORAL


   10/10/19 21:00


 11/9/19 20:59  10/16/19 08:58


 


 


 Cefepime HCl 2 gm/


 Dextrose  110 ml @ 


 220 mls/hr  DAILY


 IV


   10/10/19 09:00


 10/18/19 08:59  10/16/19 08:59


 


 


 Dextrose


  (Dextrose 50%)  25 ml  Q30M  PRN


 IV


 Hypoglycemia  10/9/19 12:45


 11/8/19 12:44   


 


 


 Dextrose


  (Dextrose 50%)  50 ml  Q30M  PRN


 IV


 Hypoglycemia  10/9/19 12:45


 11/8/19 12:44   


 


 


 Digoxin


  (Lanoxin)  0.125 mg  DAILY


 ORAL


   10/11/19 09:00


 11/10/19 08:59  10/16/19 08:58


 


 


 Donepezil HCl


  (Aricept)  5 mg  QHS


 ORAL


   10/12/19 21:00


 11/11/19 20:59  10/15/19 21:03


 


 


 Escitalopram


 Oxalate


  (Lexapro)  5 mg  DAILY


 ORAL


   10/13/19 09:00


 11/12/19 08:59  10/16/19 08:58


 


 


 Lisinopril


  (Zestril)  10 mg  DAILY


 ORAL


   10/12/19 09:00


 11/11/19 08:59  10/16/19 08:59


 


 


 Loperamide HCl


  (Imodium)  2 mg  QID  PRN


 ORAL


 Diarrhea  10/16/19 09:15


 11/15/19 09:14   


 


 


 Lorazepam


  (Ativan 2mg/ml


 1ml)  2 mg  Q2H  PRN


 IV


 For Anxiety  10/9/19 12:45


 10/16/19 12:44   


 


 


 Promethazine HCl/


 Codeine


  (Phenergan with


 Codeine)  5 ml  Q4H  PRN


 ORAL


 For Cough  10/9/19 12:45


 11/8/19 12:44   


 


 


 Rivaroxaban


  (Xarelto)  15 mg  DAILY


 ORAL


   10/10/19 09:00


 11/9/19 08:59  10/16/19 08:58


 


 


 Tamsulosin HCl


  (Flomax)  0.4 mg  QHS


 ORAL


   10/9/19 21:00


 11/8/19 20:59  10/15/19 21:03


 


 


 Trazodone HCl


  (Desyrel)  25 mg  BEDTIME


 ORAL


   10/9/19 21:00


 11/8/19 20:59  10/15/19 21:03


 


 


 Vancomycin HCl


  (Vanco rx to


 dose)  1 ea  DAILY  PRN


 MISC


 Per rx protocol  10/9/19 13:45


 11/8/19 13:44   


 


 


 Vancomycin HCl 1


 gm/Dextrose  275 ml @ 


 183.708


 mls/hr  Q12H


 IVPB


   10/15/19 04:00


 10/20/19 03:59  10/16/19 04:40


 

















Opal Alamo M.D. Oct 16, 2019 11:59

## 2019-10-16 NOTE — NUR
RD ASSESSMENT & RECOMMENDATIONS

SEE CARE ACTIVITY FOR COMPLETE ASSESSMENT



DAILY ESTIMATED NEEDS:

Needs based on Wound, wasting 51kg  

30-35  kcals/kg 

6833-7522  total kcals

1.25-1.5  g protein/kg

64-77  g total protein 

25-30  mL/kg

4534-1435  total fluid mLs



NUTRITION DIAGNOSIS:

Increased kcal and pro needs r/t sepsis, wound healing and generalized

wasting as evidenced by critically elev WBC on adm (27.7*-> now wnl),

stage 1 sacral wound, pt w/ moderate generalized wasting.





CURRENT DIET: cardiac, ms chopped     





PO DIET RECOMMENDATIONS:

Liberalized LOW NA diet/ TEXTURE per SLP  



 

ADDITIONAL RECOMMENDATIONS:

1) Maintain calibrated bed scale wts 

2) Ensure Enlive TID w/ meals  

    + snacks in b/w meals  

3) Wound care: MVI x 1, Vit C 250mg QD 

             rec WC eval for sacral wound 

4) Diarrhea: rec probiotics BID 

              : yogurt added to meals

## 2019-10-17 VITALS — SYSTOLIC BLOOD PRESSURE: 128 MMHG | DIASTOLIC BLOOD PRESSURE: 54 MMHG

## 2019-10-17 VITALS — DIASTOLIC BLOOD PRESSURE: 51 MMHG | SYSTOLIC BLOOD PRESSURE: 121 MMHG

## 2019-10-17 VITALS — DIASTOLIC BLOOD PRESSURE: 53 MMHG | SYSTOLIC BLOOD PRESSURE: 109 MMHG

## 2019-10-17 VITALS — DIASTOLIC BLOOD PRESSURE: 58 MMHG | SYSTOLIC BLOOD PRESSURE: 136 MMHG

## 2019-10-17 VITALS — SYSTOLIC BLOOD PRESSURE: 114 MMHG | DIASTOLIC BLOOD PRESSURE: 59 MMHG

## 2019-10-17 VITALS — DIASTOLIC BLOOD PRESSURE: 44 MMHG | SYSTOLIC BLOOD PRESSURE: 101 MMHG

## 2019-10-17 LAB
% IRON SATURATION: 17 % (ref 15–50)
ADD MANUAL DIFF: NO
ALBUMIN SERPL-MCNC: 1.7 G/DL (ref 3.4–5)
ALBUMIN/GLOB SERPL: 0.4 {RATIO} (ref 1–2.7)
ALP SERPL-CCNC: 125 U/L (ref 46–116)
ALT SERPL-CCNC: 40 U/L (ref 12–78)
ANION GAP SERPL CALC-SCNC: 4 MMOL/L (ref 5–15)
AST SERPL-CCNC: 21 U/L (ref 15–37)
BASOPHILS NFR BLD AUTO: 0.6 % (ref 0–2)
BILIRUB SERPL-MCNC: 0.3 MG/DL (ref 0.2–1)
BUN SERPL-MCNC: 11 MG/DL (ref 7–18)
CALCIUM SERPL-MCNC: 8.7 MG/DL (ref 8.5–10.1)
CHLORIDE SERPL-SCNC: 99 MMOL/L (ref 98–107)
CO2 SERPL-SCNC: 35 MMOL/L (ref 21–32)
CREAT SERPL-MCNC: 0.6 MG/DL (ref 0.55–1.3)
EOSINOPHIL NFR BLD AUTO: 2.3 % (ref 0–3)
ERYTHROCYTE [DISTWIDTH] IN BLOOD BY AUTOMATED COUNT: 10.9 % (ref 11.6–14.8)
GLOBULIN SER-MCNC: 3.8 G/DL
HCT VFR BLD CALC: 31.5 % (ref 42–52)
HGB BLD-MCNC: 10.6 G/DL (ref 14.2–18)
IRON SERPL-MCNC: 28 UG/DL (ref 50–175)
LYMPHOCYTES NFR BLD AUTO: 30 % (ref 20–45)
MCV RBC AUTO: 97 FL (ref 80–99)
MONOCYTES NFR BLD AUTO: 5.9 % (ref 1–10)
NEUTROPHILS NFR BLD AUTO: 61.2 % (ref 45–75)
PLATELET # BLD: 304 K/UL (ref 150–450)
POTASSIUM SERPL-SCNC: 4.6 MMOL/L (ref 3.5–5.1)
RBC # BLD AUTO: 3.23 M/UL (ref 4.7–6.1)
SODIUM SERPL-SCNC: 138 MMOL/L (ref 136–145)
TIBC SERPL-MCNC: 167 UG/DL (ref 250–450)
UNSATURATED IRON BINDING: 139 UG/DL (ref 112–346)
WBC # BLD AUTO: 5.5 K/UL (ref 4.8–10.8)

## 2019-10-17 RX ADMIN — LISINOPRIL SCH MG: 10 TABLET ORAL at 18:13

## 2019-10-17 RX ADMIN — TAMSULOSIN HYDROCHLORIDE SCH MG: 0.4 CAPSULE ORAL at 21:45

## 2019-10-17 RX ADMIN — SODIUM CHLORIDE SCH MLS/HR: 0.9 INJECTION INTRAVENOUS at 09:02

## 2019-10-17 RX ADMIN — CARVEDILOL SCH MG: 6.25 TABLET, FILM COATED ORAL at 21:00

## 2019-10-17 RX ADMIN — TRAZODONE HYDROCHLORIDE SCH MG: 50 TABLET ORAL at 21:45

## 2019-10-17 RX ADMIN — DIGOXIN SCH MG: 0.12 TABLET ORAL at 08:37

## 2019-10-17 RX ADMIN — DONEPEZIL HYDROCHLORIDE SCH MG: 5 TABLET, FILM COATED ORAL at 21:45

## 2019-10-17 RX ADMIN — LISINOPRIL SCH MG: 10 TABLET ORAL at 08:36

## 2019-10-17 RX ADMIN — CARVEDILOL SCH MG: 6.25 TABLET, FILM COATED ORAL at 08:37

## 2019-10-17 RX ADMIN — ATORVASTATIN CALCIUM SCH MG: 20 TABLET, FILM COATED ORAL at 21:46

## 2019-10-17 RX ADMIN — RIVAROXABAN SCH MG: 15 TABLET, FILM COATED ORAL at 08:35

## 2019-10-17 NOTE — NUR
NURSE NOTES:

Received pt and report from RAHEEL Jefferson. Observed pt resting in bed with both eyes closed, 
arousable to voice. Pt is A/Ox3. Cardiac monitor is in placed, IV site intact, asymptomatic, 
and patent. Bed is in the lowest position and locked. Call light within reach. No signs and 
symptoms of acute distress noted at this time. Will continue plan of care.

## 2019-10-17 NOTE — NUR
NURSE NOTES: Nurse report given by RAHEEL Rubin. Patient's sleeping in bed but easily awaken. AO 
x 4, denies pain, no s/s of distress or SOB. Bed low and locked, call light within reach. IV 
is saline locked, patent and asymptomatic. On 2L nasal cannula. Will continue to monitor.

## 2019-10-17 NOTE — PROGRESS NOTE
DATE:  10/17/2019

SUBJECTIVE:  This is an 83-year-old male patient with shortness of breath,

weakness, and hypertension.  This patient patient does have some mood

lability, confusion, disorganized thought process, and decline in

cognition below his baseline.  That is why, his attending has requested

daily psychiatric consultation.



MENTAL STATUS EXAMINATION:  An  83-year-old male.  Appearance is

disheveled.  Attitude, irritable and agitated.  Affect, guarded and

restricted.  Intellect poor.  Mood, depressed and anxious.  Motor

activity, psychomotor agitation.  Insight and judgment is poor.



DIAGNOSIS:  Major depressive disorder, mild, recurrent with psychotic

features, rule out dementia with psychosis.



PLAN:  Treat him with Aricept 5 mg at bedtime, Lexapro 5 mg daily, Aricept

5 mg at bedtime, and trazodone 25 mg at bedtime.  A 20 minutes of

cognitive behavioral therapy to help him identify his automatic negative

thoughts and help him convert his negative thoughts to more positive

thoughts to reduce depression, anxiety, and mood lability.  Chart

reviewed.  Discussed with staff.  Seen and assessed at the bedside.









  ______________________________________________

  Vero Andrea M.D.





DR:  NAKUL

D:  10/17/2019 06:27

T:  10/17/2019 06:57

JOB#:  [id number]

CC:

## 2019-10-17 NOTE — INFECTIOUS DISEASES PROG NOTE
Assessment/Plan


Assessment/Plan





Assessment:


SEvere sepsis 2ry to PNA


   -10/14 CXR: Suspect slight worsening of left upper lobe infiltrate and some 

associated volume loss. The latter may in part be artifactual due to patient 

rotation, however.


    -CT chest:  Consolidation within the left upper lobe.  Bilateral patchy 

groundglass and interstitial infiltrates.  Findings could be related to 

infectious inflammatory infiltrate.  Posttreatment imaging recommended to 

ensure resolution and exclude underlying neoplastic process malignancy.  

Bilateral pleural effusions as well as ascites and subcutaneous 


edema anasarca..  Cardiomegaly.





Transient hypotension


    -CXR:  Left upper lobe consolidation most likely due to pneumonia


    -influenza sc, legionella ag urine neg


    -sp cx C.albicans (colonizer)





 Afebrile


Leukocytosis; SP


   -u/a wbc 15-20, nit neg, leuk +1; ucx Neg





Lactic acidosis, mild- resolved





Afib on AC


HTN


HLD


CHF


anemia


BPH 


schizophrenia


CAD/MI s/p CABG


 s/p AICD


 tobacco abuse


 cardiomyopathy EF 25-30%


[facility]


 


Plan:


-Continue empiric Cefepime #9/10 for PNA


    -10/16 SP IV Vancomycin #8


    -10/14 SP azithromycin #6


   -10/9 SP ZOsyn x1





-f/u cx


-Monitor CBC/CMP, temperatures


-aspiration precautions





Thank you for this consultation. Will continue to follow along with you.





Discussed with RN





Subjective


Allergies:  


Coded Allergies:  


     No Known Allergies (Unverified , 10/9/19)


Subjective


afebrile


no leukocytosis





Objective


Vital Signs





Last 24 Hour Vital Signs








  Date Time  Temp Pulse Resp B/P (MAP) Pulse Ox O2 Delivery O2 Flow Rate FiO2


 


10/17/19 12:00  60      


 


10/17/19 09:35  63 18  99 Nasal Cannula 2.0 28


 


10/17/19 09:35     99 Nasal Cannula 2.0 28


 


10/17/19 09:00      Nasal Cannula 2.0 


 


10/17/19 08:37  82      


 


10/17/19 08:37  82  136/58    


 


10/17/19 08:36    136/58    


 


10/17/19 08:00  60      


 


10/17/19 08:00 97.8 61 18 136/58 (84) 99   


 


10/17/19 04:00 98.1 61 17 128/54 (78) 94   


 


10/17/19 04:00  63      


 


10/17/19 00:00 98.3 63 20 109/53 (71) 94   


 


10/16/19 21:00      Nasal Cannula 2.0 


 


10/16/19 20:59  60  114/52    


 


10/16/19 20:00  61      


 


10/16/19 20:00 98.6 60 22 114/52 (72) 94   


 


10/16/19 19:20     97 Nasal Cannula 2.0 28


 


10/16/19 19:10  71 18  98 Nasal Cannula 2.0 28


 


10/16/19 16:00 97.3 60 20 102/53 (69) 96   


 


10/16/19 16:00  60      








Height (Feet):  5


Height (Inches):  2.00


Weight (Pounds):  112


Objective


GENERAL:  A thin elderly appearing  male, in no acute


distress.


HEENT:  Normocephalic and atraumatic.  Bitemporal wasting.  Pupils are


equal, round, and reactive to light.  Sclerae anicteric.  Oral mucosa are


moist.


NECK:  Supple.  There is no jugular venous distention.


LUNGS:  Left rales.


HEART:  Regular S1, S2.  No murmur or S3.


ABDOMEN:  Soft and nontender.  No palpable mass.


EXTREMITIES:  No cyanosis, clubbing, or edema.





Microbiology








 Date/Time


Source Procedure


Growth Status


 


 


 10/14/19 16:00


Sputum Induced Gram Stain - Final Complete


 


 10/14/19 16:00 Sputum Culture - Final


Candida Albicans Complete


 


 10/14/19 22:17


Urine,Clean Catch Urine Culture - Final


NO GROWTH AFTER 48 HOURS Complete











Laboratory Tests








Test


  10/17/19


05:47


 


White Blood Count


  5.5 K/UL


(4.8-10.8)


 


Red Blood Count


  3.23 M/UL


(4.70-6.10)  L


 


Hemoglobin


  10.6 G/DL


(14.2-18.0)  L


 


Hematocrit


  31.5 %


(42.0-52.0)  L


 


Mean Corpuscular Volume 97 FL (80-99)  


 


Mean Corpuscular Hemoglobin


  32.9 PG


(27.0-31.0)  H


 


Mean Corpuscular Hemoglobin


Concent 33.8 G/DL


(32.0-36.0)


 


Red Cell Distribution Width


  10.9 %


(11.6-14.8)  L


 


Platelet Count


  304 K/UL


(150-450)


 


Mean Platelet Volume


  5.5 FL


(6.5-10.1)  L


 


Neutrophils (%) (Auto)


  61.2 %


(45.0-75.0)


 


Lymphocytes (%) (Auto)


  30.0 %


(20.0-45.0)


 


Monocytes (%) (Auto)


  5.9 %


(1.0-10.0)


 


Eosinophils (%) (Auto)


  2.3 %


(0.0-3.0)


 


Basophils (%) (Auto)


  0.6 %


(0.0-2.0)


 


Sodium Level


  138 MMOL/L


(136-145)


 


Potassium Level


  4.6 MMOL/L


(3.5-5.1)


 


Chloride Level


  99 MMOL/L


()


 


Carbon Dioxide Level


  35 MMOL/L


(21-32)  H


 


Anion Gap


  4 mmol/L


(5-15)  L


 


Blood Urea Nitrogen


  11 mg/dL


(7-18)


 


Creatinine


  0.6 MG/DL


(0.55-1.30)


 


Estimat Glomerular Filtration


Rate  mL/min (>60)  


 


 


Glucose Level


  72 MG/DL


()  L


 


Calcium Level


  8.7 MG/DL


(8.5-10.1)


 


Iron Level


  28 ug/dL


()  L


 


Total Iron Binding Capacity


  167 ug/dL


(250-450)  L


 


Percent Iron Saturation 17 % (15-50)  


 


Unsaturated Iron Binding


  139 ug/dL


(112-346)


 


Total Bilirubin


  0.3 MG/DL


(0.2-1.0)


 


Aspartate Amino Transf


(AST/SGOT) 21 U/L (15-37)


 


 


Alanine Aminotransferase


(ALT/SGPT) 40 U/L (12-78)


 


 


Alkaline Phosphatase


  125 U/L


()  H


 


Total Protein


  5.5 G/DL


(6.4-8.2)  L


 


Albumin


  1.7 G/DL


(3.4-5.0)  L


 


Globulin 3.8 g/dL  


 


Albumin/Globulin Ratio


  0.4 (1.0-2.7)


L


 


Lipase


  263 U/L


()


 


Carcinoembryonic Antigen Pending  


 


Vitamin B12 Level


  1543 PG/ML


(193-986)  H


 


Folate


  7.9 NG/ML


(8.6-58.9)  L











Current Medications








 Medications


  (Trade)  Dose


 Ordered  Sig/Colleen


 Route


 PRN Reason  Start Time


 Stop Time Status Last Admin


Dose Admin


 


 Acetaminophen


  (Tylenol)  650 mg  Q4H  PRN


 ORAL


 FEVER  10/9/19 12:45


 11/8/19 12:44   


 


 


 Albuterol/


 Ipratropium


  (Albuterol/


 Ipratropium)  3 ml  Q4H  PRN


 HHN


 Shortness of Breath  10/12/19 12:45


 10/17/19 12:44  10/12/19 16:10


 


 


 Atorvastatin


 Calcium


  (Lipitor)  40 mg  BEDTIME


 ORAL


   10/10/19 21:00


 11/9/19 20:59  10/16/19 20:59


 


 


 Carvedilol


  (Coreg)  6.25 mg  EVERY 12  HOURS


 ORAL


   10/10/19 21:00


 11/9/19 20:59  10/17/19 08:37


 


 


 Cefepime HCl 2 gm/


 Dextrose  110 ml @ 


 220 mls/hr  DAILY


 IV


   10/10/19 09:00


 10/18/19 23:59  10/17/19 09:02


 


 


 Dextrose


  (Dextrose 50%)  25 ml  Q30M  PRN


 IV


 Hypoglycemia  10/9/19 12:45


 11/8/19 12:44   


 


 


 Dextrose


  (Dextrose 50%)  50 ml  Q30M  PRN


 IV


 Hypoglycemia  10/9/19 12:45


 11/8/19 12:44   


 


 


 Digoxin


  (Lanoxin)  0.125 mg  DAILY


 ORAL


   10/11/19 09:00


 11/10/19 08:59  10/17/19 08:37


 


 


 Donepezil HCl


  (Aricept)  5 mg  QHS


 ORAL


   10/12/19 21:00


 11/11/19 20:59  10/16/19 20:58


 


 


 Escitalopram


 Oxalate


  (Lexapro)  5 mg  DAILY


 ORAL


   10/13/19 09:00


 11/12/19 08:59  10/17/19 08:36


 


 


 Folic Acid


  (Folate)  1 mg  DAILY


 ORAL


   10/18/19 09:00


 11/17/19 08:59   


 


 


 Lisinopril


  (Zestril)  10 mg  BID


 ORAL


   10/17/19 09:00


 11/16/19 08:59  10/17/19 08:36


 


 


 Loperamide HCl


  (Imodium)  2 mg  QID  PRN


 ORAL


 Diarrhea  10/16/19 09:15


 11/15/19 09:14   


 


 


 Promethazine HCl/


 Codeine


  (Phenergan with


 Codeine)  5 ml  Q4H  PRN


 ORAL


 For Cough  10/9/19 12:45


 11/8/19 12:44   


 


 


 Rivaroxaban


  (Xarelto)  15 mg  DAILY


 ORAL


   10/10/19 09:00


 11/9/19 08:59  10/17/19 08:35


 


 


 Tamsulosin HCl


  (Flomax)  0.4 mg  QHS


 ORAL


   10/9/19 21:00


 11/8/19 20:59  10/16/19 20:59


 


 


 Trazodone HCl


  (Desyrel)  25 mg  BEDTIME


 ORAL


   10/9/19 21:00


 11/8/19 20:59  10/16/19 20:59


 

















Opal Alamo M.D. Oct 17, 2019 12:41

## 2019-10-17 NOTE — NUR
HAND-OFF: 

Report given to ELIZABETH Chau RN. Patient in bed asleep with no S/S of distress noted. Endorsed 
plan of care.

## 2019-10-17 NOTE — PULMONOLOGY PROGRESS NOTE
Assessment/Plan


Problems:  


(1) Pneumonia


(2) COPD (chronic obstructive pulmonary disease)


(3) EF 25%


(4) Atrial fibrillation


(5) ICD (implantable cardioverter-defibrillator) in place


(6) CAD (coronary artery disease)


(7) BPH (benign prostatic hyperplasia)


(8) HTN (hypertension)


(9) Chronic anticoagulation


Assessment/Plan


wbc decreasing, back to normal 


continue abx


CT chest reviewed, extensive consolidation


all  culture results are negative


chest pt


legionella negative


cultures so far are all negative.


heart rate controlled





cxr in am








Subjective


ROS Limited/Unobtainable:  No


Constitutional:  Reports: no symptoms


HEENT:  Repors: no symptoms


Respiratory:  Reports: no symptoms


Cardiovascular:  Reports: no symptoms


Allergies:  


Coded Allergies:  


     No Known Allergies (Unverified , 10/9/19)





Objective





Last 24 Hour Vital Signs








  Date Time  Temp Pulse Resp B/P (MAP) Pulse Ox O2 Delivery O2 Flow Rate FiO2


 


10/17/19 09:35  63 18  99 Nasal Cannula 2.0 28


 


10/17/19 09:35     99 Nasal Cannula 2.0 28


 


10/17/19 09:00      Nasal Cannula 2.0 


 


10/17/19 08:37  82      


 


10/17/19 08:37  82  136/58    


 


10/17/19 08:36    136/58    


 


10/17/19 08:00  60      


 


10/17/19 08:00 97.8 61 18 136/58 (84) 99   


 


10/17/19 04:00 98.1 61 17 128/54 (78) 94   


 


10/17/19 04:00  63      


 


10/17/19 00:00 98.3 63 20 109/53 (71) 94   


 


10/16/19 21:00      Nasal Cannula 2.0 


 


10/16/19 20:59  60  114/52    


 


10/16/19 20:00  61      


 


10/16/19 20:00 98.6 60 22 114/52 (72) 94   


 


10/16/19 19:20     97 Nasal Cannula 2.0 28


 


10/16/19 19:10  71 18  98 Nasal Cannula 2.0 28


 


10/16/19 16:00 97.3 60 20 102/53 (69) 96   


 


10/16/19 16:00  60      


 


10/16/19 12:00  55      


 


10/16/19 12:00 97.5 67 20 124/58 (80) 98   

















Intake and Output  


 


 10/16/19 10/17/19





 18:59 06:59


 


Intake Total 170 ml 


 


Balance 170 ml 


 


  


 


Intake Oral 170 ml 


 


# Voids 7 


 


# Bowel Movements 3 1








General Appearance:  cachetic


HEENT:  normocephalic, atraumatic


Respiratory/Chest:  chest wall non-tender, lungs clear


Cardiovascular:  normal peripheral pulses, normal rate


Abdomen:  normal bowel sounds, soft, non tender


Genitourinary:  normal external genitalia


Extremities:  no cyanosis


Skin:  no rash


Neurologic/Psychiatric:  CNs II-XII grossly normal





Microbiology








 Date/Time


Source Procedure


Growth Status


 


 


 10/14/19 16:00


Sputum Induced Gram Stain - Final Complete


 


 10/14/19 16:00 Sputum Culture - Final


Candida Albicans Complete


 


 10/14/19 22:17


Urine,Clean Catch Urine Culture - Final


NO GROWTH AFTER 48 HOURS Complete








Laboratory Tests


10/17/19 05:47: 


White Blood Count 5.5, Red Blood Count 3.23L, Hemoglobin 10.6L, Hematocrit 31.5L

, Mean Corpuscular Volume 97, Mean Corpuscular Hemoglobin 32.9H, Mean 

Corpuscular Hemoglobin Concent 33.8, Red Cell Distribution Width 10.9L, 

Platelet Count 304, Mean Platelet Volume 5.5L, Neutrophils (%) (Auto) 61.2, 

Lymphocytes (%) (Auto) 30.0, Monocytes (%) (Auto) 5.9, Eosinophils (%) (Auto) 

2.3, Basophils (%) (Auto) 0.6, Sodium Level 138, Potassium Level 4.6, Chloride 

Level 99, Carbon Dioxide Level 35H, Anion Gap 4L, Blood Urea Nitrogen 11, 

Creatinine 0.6, Estimat Glomerular Filtration Rate , Glucose Level 72L, Calcium 

Level 8.7, Iron Level 28L, Total Iron Binding Capacity 167L, Percent Iron 

Saturation 17, Unsaturated Iron Binding 139, Total Bilirubin 0.3, Aspartate 

Amino Transf (AST/SGOT) 21, Alanine Aminotransferase (ALT/SGPT) 40, Alkaline 

Phosphatase 125H, Total Protein 5.5L, Albumin 1.7L, Globulin 3.8, Albumin/

Globulin Ratio 0.4L, Lipase 263, Carcinoembryonic Antigen [Pending], Vitamin 

B12 Level 1543H, Folate 7.9L





Current Medications








 Medications


  (Trade)  Dose


 Ordered  Sig/Colleen


 Route


 PRN Reason  Start Time


 Stop Time Status Last Admin


Dose Admin


 


 Acetaminophen


  (Tylenol)  650 mg  Q4H  PRN


 ORAL


 FEVER  10/9/19 12:45


 11/8/19 12:44   


 


 


 Albuterol/


 Ipratropium


  (Albuterol/


 Ipratropium)  3 ml  Q4H  PRN


 HHN


 Shortness of Breath  10/12/19 12:45


 10/17/19 12:44  10/12/19 16:10


 


 


 Atorvastatin


 Calcium


  (Lipitor)  40 mg  BEDTIME


 ORAL


   10/10/19 21:00


 11/9/19 20:59  10/16/19 20:59


 


 


 Carvedilol


  (Coreg)  6.25 mg  EVERY 12  HOURS


 ORAL


   10/10/19 21:00


 11/9/19 20:59  10/17/19 08:37


 


 


 Cefepime HCl 2 gm/


 Dextrose  110 ml @ 


 220 mls/hr  DAILY


 IV


   10/10/19 09:00


 10/18/19 08:59  10/17/19 09:02


 


 


 Dextrose


  (Dextrose 50%)  25 ml  Q30M  PRN


 IV


 Hypoglycemia  10/9/19 12:45


 11/8/19 12:44   


 


 


 Dextrose


  (Dextrose 50%)  50 ml  Q30M  PRN


 IV


 Hypoglycemia  10/9/19 12:45


 11/8/19 12:44   


 


 


 Digoxin


  (Lanoxin)  0.125 mg  DAILY


 ORAL


   10/11/19 09:00


 11/10/19 08:59  10/17/19 08:37


 


 


 Donepezil HCl


  (Aricept)  5 mg  QHS


 ORAL


   10/12/19 21:00


 11/11/19 20:59  10/16/19 20:58


 


 


 Escitalopram


 Oxalate


  (Lexapro)  5 mg  DAILY


 ORAL


   10/13/19 09:00


 11/12/19 08:59  10/17/19 08:36


 


 


 Lisinopril


  (Zestril)  10 mg  BID


 ORAL


   10/17/19 09:00


 11/16/19 08:59  10/17/19 08:36


 


 


 Loperamide HCl


  (Imodium)  2 mg  QID  PRN


 ORAL


 Diarrhea  10/16/19 09:15


 11/15/19 09:14   


 


 


 Promethazine HCl/


 Codeine


  (Phenergan with


 Codeine)  5 ml  Q4H  PRN


 ORAL


 For Cough  10/9/19 12:45


 11/8/19 12:44   


 


 


 Rivaroxaban


  (Xarelto)  15 mg  DAILY


 ORAL


   10/10/19 09:00


 11/9/19 08:59  10/17/19 08:35


 


 


 Tamsulosin HCl


  (Flomax)  0.4 mg  QHS


 ORAL


   10/9/19 21:00


 11/8/19 20:59  10/16/19 20:59


 


 


 Trazodone HCl


  (Desyrel)  25 mg  BEDTIME


 ORAL


   10/9/19 21:00


 11/8/19 20:59  10/16/19 20:59


 

















Lucy Carroll MD Oct 17, 2019 10:40

## 2019-10-17 NOTE — NUR
NURSE NOTES:Received patient from [facility].  Patient in bed, on 2L NC, no s/s of respiratory 
distress.  Bed in low position, locked, bed alarm on, call light within reach.

## 2019-10-17 NOTE — CARDIOLOGY PROGRESS NOTE
Assessment/Plan


Assessment/Plan


coronary artery disease s/p cabg


ischemic cardiomyopathy, ef 25 to 30%


biventricular ICD [employer] device.


Afib on AC


HLD


chronic systolic CHF


anemia


BPH 


schizophrenia


pneumonia 








bp is ok   


on acei dose will increase dose  is on coreg agian 


at risk for chf 


cr looks ok 


tele  afib v pacing 


has icd 


no cahnge in med s











ct reprot noted 


1.  Consolidation within the left upper lobe.  Bilateral patchy 


groundglass and interstitial infiltrates.  Findings could be related to 


infectious inflammatory infiltrate.  Posttreatment imaging recommended to 


ensure resolution and exclude underlying neoplastic process malignancy.


2.  Bilateral pleural effusions as well as ascites and subcutaneous 


edema anasarca.


3.  Cardiomegaly





Subjective


Cardiovascular:  Denies: chest pain


Respiratory:  Denies: shortness of breath


Gastrointestinal/Abdominal:  Denies: abdomen distended





Objective





Last 24 Hour Vital Signs








  Date Time  Temp Pulse Resp B/P (MAP) Pulse Ox O2 Delivery O2 Flow Rate FiO2


 


10/17/19 18:13    114/59    


 


10/17/19 16:00  69      


 


10/17/19 16:00 98.3 62 18 114/59 (77) 97   


 


10/17/19 12:00 97.6 62 18 121/51 (74) 95   


 


10/17/19 12:00  60      


 


10/17/19 09:35  63 18  99 Nasal Cannula 2.0 28


 


10/17/19 09:35     99 Nasal Cannula 2.0 28


 


10/17/19 09:00      Nasal Cannula 2.0 


 


10/17/19 08:37  82      


 


10/17/19 08:37  82  136/58    


 


10/17/19 08:36    136/58    


 


10/17/19 08:00  60      


 


10/17/19 08:00 97.8 61 18 136/58 (84) 99   


 


10/17/19 04:00 98.1 61 17 128/54 (78) 94   


 


10/17/19 04:00  63      


 


10/17/19 00:00 98.3 63 20 109/53 (71) 94   








General Appearance:  no apparent distress, alert











Intake and Output  


 


 10/16/19 10/17/19





 19:00 07:00


 


Intake Total 170 ml 


 


Balance 170 ml 


 


  


 


Intake Oral 170 ml 


 


# Voids 7 


 


# Bowel Movements 3 1











Laboratory Tests








Test


  10/17/19


05:47


 


White Blood Count


  5.5 K/UL


(4.8-10.8)


 


Red Blood Count


  3.23 M/UL


(4.70-6.10)  L


 


Hemoglobin


  10.6 G/DL


(14.2-18.0)  L


 


Hematocrit


  31.5 %


(42.0-52.0)  L


 


Mean Corpuscular Volume 97 FL (80-99)  


 


Mean Corpuscular Hemoglobin


  32.9 PG


(27.0-31.0)  H


 


Mean Corpuscular Hemoglobin


Concent 33.8 G/DL


(32.0-36.0)


 


Red Cell Distribution Width


  10.9 %


(11.6-14.8)  L


 


Platelet Count


  304 K/UL


(150-450)


 


Mean Platelet Volume


  5.5 FL


(6.5-10.1)  L


 


Neutrophils (%) (Auto)


  61.2 %


(45.0-75.0)


 


Lymphocytes (%) (Auto)


  30.0 %


(20.0-45.0)


 


Monocytes (%) (Auto)


  5.9 %


(1.0-10.0)


 


Eosinophils (%) (Auto)


  2.3 %


(0.0-3.0)


 


Basophils (%) (Auto)


  0.6 %


(0.0-2.0)


 


Sodium Level


  138 MMOL/L


(136-145)


 


Potassium Level


  4.6 MMOL/L


(3.5-5.1)


 


Chloride Level


  99 MMOL/L


()


 


Carbon Dioxide Level


  35 MMOL/L


(21-32)  H


 


Anion Gap


  4 mmol/L


(5-15)  L


 


Blood Urea Nitrogen


  11 mg/dL


(7-18)


 


Creatinine


  0.6 MG/DL


(0.55-1.30)


 


Estimat Glomerular Filtration


Rate  mL/min (>60)  


 


 


Glucose Level


  72 MG/DL


()  L


 


Calcium Level


  8.7 MG/DL


(8.5-10.1)


 


Iron Level


  28 ug/dL


()  L


 


Total Iron Binding Capacity


  167 ug/dL


(250-450)  L


 


Percent Iron Saturation 17 % (15-50)  


 


Unsaturated Iron Binding


  139 ug/dL


(112-346)


 


Total Bilirubin


  0.3 MG/DL


(0.2-1.0)


 


Aspartate Amino Transf


(AST/SGOT) 21 U/L (15-37)


 


 


Alanine Aminotransferase


(ALT/SGPT) 40 U/L (12-78)


 


 


Alkaline Phosphatase


  125 U/L


()  H


 


Total Protein


  5.5 G/DL


(6.4-8.2)  L


 


Albumin


  1.7 G/DL


(3.4-5.0)  L


 


Globulin 3.8 g/dL  


 


Albumin/Globulin Ratio


  0.4 (1.0-2.7)


L


 


Lipase


  263 U/L


()


 


Carcinoembryonic Antigen Pending  


 


Vitamin B12 Level


  1543 PG/ML


(193-986)  H


 


Folate


  7.9 NG/ML


(8.6-58.9)  L











Microbiology








 Date/Time


Source Procedure


Growth Status


 


 


 10/14/19 22:17


Urine,Clean Catch Urine Culture - Final


NO GROWTH AFTER 48 HOURS Complete

















Robert Ventura MD Oct 17, 2019 21:13

## 2019-10-17 NOTE — GENERAL PROGRESS NOTE
Assessment/Plan


Problem List:  


(1) UTI (urinary tract infection)


ICD Codes:  N39.0 - Urinary tract infection, site not specified


SNOMED:  14724254


Qualifiers:  


   Qualified Codes:  N39.0 - Urinary tract infection, site not specified


(2) Sepsis


ICD Codes:  A41.9 - Sepsis, unspecified organism


SNOMED:  15985911


Qualifiers:  


   Qualified Codes:  A41.9 - Sepsis, unspecified organism


(3) Pneumonia


ICD Codes:  J18.9 - Pneumonia, unspecified organism


SNOMED:  073165370


Qualifiers:  


   Qualified Codes:  J18.1 - Lobar pneumonia, unspecified organism


(4) Hypotension


ICD Codes:  I95.9 - Hypotension, unspecified


SNOMED:  22384757


(5) CHF (congestive heart failure)


ICD Codes:  I50.9 - Heart failure, unspecified


SNOMED:  23593481


(6) HTN (hypertension)


ICD Codes:  I10 - Essential (primary) hypertension


SNOMED:  06955886


Status:  stable, progressing, unchanged


Assessment/Plan:


o2 pulm tx abx pt diet cbc bmp am aru eval





Subjective


Constitutional:  Reports: weakness


Respiratory:  Reports: shortness of breath


Allergies:  


Coded Allergies:  


     No Known Allergies (Unverified , 10/9/19)


All Systems:  reviewed and negative except above


Subjective


o2nc calm in bed





Objective





Last 24 Hour Vital Signs








  Date Time  Temp Pulse Resp B/P (MAP) Pulse Ox O2 Delivery O2 Flow Rate FiO2


 


10/17/19 09:35  63 18  99 Nasal Cannula 2.0 28


 


10/17/19 09:35     99 Nasal Cannula 2.0 28


 


10/17/19 09:00      Nasal Cannula 2.0 


 


10/17/19 08:37  82      


 


10/17/19 08:37  82  136/58    


 


10/17/19 08:36    136/58    


 


10/17/19 08:00  60      


 


10/17/19 08:00 97.8 61 18 136/58 (84) 99   


 


10/17/19 04:00 98.1 61 17 128/54 (78) 94   


 


10/17/19 04:00  63      


 


10/17/19 00:00 98.3 63 20 109/53 (71) 94   


 


10/16/19 21:00      Nasal Cannula 2.0 


 


10/16/19 20:59  60  114/52    


 


10/16/19 20:00  61      


 


10/16/19 20:00 98.6 60 22 114/52 (72) 94   


 


10/16/19 19:20     97 Nasal Cannula 2.0 28


 


10/16/19 19:10  71 18  98 Nasal Cannula 2.0 28


 


10/16/19 16:00 97.3 60 20 102/53 (69) 96   


 


10/16/19 16:00  60      


 


10/16/19 12:00  55      


 


10/16/19 12:00 97.5 67 20 124/58 (80) 98   

















Intake and Output  


 


 10/16/19 10/17/19





 18:59 06:59


 


Intake Total 170 ml 


 


Balance 170 ml 


 


  


 


Intake Oral 170 ml 


 


# Voids 7 


 


# Bowel Movements 3 1








Laboratory Tests


10/17/19 05:47: 


White Blood Count 5.5, Red Blood Count 3.23L, Hemoglobin 10.6L, Hematocrit 31.5L

, Mean Corpuscular Volume 97, Mean Corpuscular Hemoglobin 32.9H, Mean 

Corpuscular Hemoglobin Concent 33.8, Red Cell Distribution Width 10.9L, 

Platelet Count 304, Mean Platelet Volume 5.5L, Neutrophils (%) (Auto) 61.2, 

Lymphocytes (%) (Auto) 30.0, Monocytes (%) (Auto) 5.9, Eosinophils (%) (Auto) 

2.3, Basophils (%) (Auto) 0.6, Sodium Level 138, Potassium Level 4.6, Chloride 

Level 99, Carbon Dioxide Level 35H, Anion Gap 4L, Blood Urea Nitrogen 11, 

Creatinine 0.6, Estimat Glomerular Filtration Rate , Glucose Level 72L, Calcium 

Level 8.7, Iron Level 28L, Total Iron Binding Capacity 167L, Percent Iron 

Saturation 17, Unsaturated Iron Binding 139, Total Bilirubin 0.3, Aspartate 

Amino Transf (AST/SGOT) 21, Alanine Aminotransferase (ALT/SGPT) 40, Alkaline 

Phosphatase 125H, Total Protein 5.5L, Albumin 1.7L, Globulin 3.8, Albumin/

Globulin Ratio 0.4L, Lipase 263, Carcinoembryonic Antigen [Pending], Vitamin 

B12 Level 1543H, Folate 7.9L


Height (Feet):  5


Height (Inches):  2.00


Weight (Pounds):  112


General Appearance:  lethargic


EENT:  normal ENT inspection


Neck:  normal alignment


Cardiovascular:  normal peripheral pulses, normal rate, regular rhythm


Respiratory/Chest:  chest wall non-tender, lungs clear, normal breath sounds


Abdomen:  normal bowel sounds, non tender, soft


Extremities:  normal inspection


Edema:  no edema noted Arm (L), no edema noted Arm (R), no edema noted Leg (L), 

no edema noted Leg (R), no edema noted Pedal (L), no edema noted Pedal (R), no 

edema noted Generalized


Neurologic:  motor weakness


Skin:  normal pigmentation, warm/dry











Timo Morejon DO Oct 17, 2019 11:42

## 2019-10-17 NOTE — GI PROGRESS NOTE
Assessment/Plan


Problems:  


(1) HTN (hypertension)


ICD Codes:  I10 - Essential (primary) hypertension


SNOMED:  45391210


(2) CAD (coronary artery disease)


ICD Codes:  I25.10 - Atherosclerotic heart disease of native coronary artery 

without angina pectoris


SNOMED:  38733692


(3) Anemia


ICD Codes:  D64.9 - Anemia, unspecified


SNOMED:  444705429


Status:  stable


Status Narrative


Discussed with Dr. Conklin.


Assessment/Plan


folate deficiency


diarrhea


electrolyte imbalance


anemia


patient on Eliquis





anemia work up reviewed, will give folate


outpatient GI procedures which Eliquis must be discontinued for min 48 hours 

prior any procedures


monitor H&H, prn transfusions


ppi


fu cdiff for diarrhea, Imodium prn if negative


electrolyte correction


follow labs





The patient was seen and examined at bedside and all new and available data was 

reviewed in the patients chart. I agree with the above findings, impression 

and plan.  (Patient seen earlier today. Signature stamp does not reflect 

patient encounter time.). - Scooby Conklin MD





Subjective


Gastrointestinal/Abdominal:  Reports: no symptoms





Objective





Last 24 Hour Vital Signs








  Date Time  Temp Pulse Resp B/P (MAP) Pulse Ox O2 Delivery O2 Flow Rate FiO2


 


10/17/19 12:00  60      


 


10/17/19 09:35  63 18  99 Nasal Cannula 2.0 28


 


10/17/19 09:35     99 Nasal Cannula 2.0 28


 


10/17/19 09:00      Nasal Cannula 2.0 


 


10/17/19 08:37  82      


 


10/17/19 08:37  82  136/58    


 


10/17/19 08:36    136/58    


 


10/17/19 08:00  60      


 


10/17/19 08:00 97.8 61 18 136/58 (84) 99   


 


10/17/19 04:00 98.1 61 17 128/54 (78) 94   


 


10/17/19 04:00  63      


 


10/17/19 00:00 98.3 63 20 109/53 (71) 94   


 


10/16/19 21:00      Nasal Cannula 2.0 


 


10/16/19 20:59  60  114/52    


 


10/16/19 20:00  61      


 


10/16/19 20:00 98.6 60 22 114/52 (72) 94   


 


10/16/19 19:20     97 Nasal Cannula 2.0 28


 


10/16/19 19:10  71 18  98 Nasal Cannula 2.0 28


 


10/16/19 16:00 97.3 60 20 102/53 (69) 96   


 


10/16/19 16:00  60      

















Intake and Output  


 


 10/16/19 10/17/19





 18:59 06:59


 


Intake Total 170 ml 


 


Balance 170 ml 


 


  


 


Intake Oral 170 ml 


 


# Voids 7 


 


# Bowel Movements 3 1











Laboratory Tests








Test


  10/17/19


05:47


 


White Blood Count


  5.5 K/UL


(4.8-10.8)


 


Red Blood Count


  3.23 M/UL


(4.70-6.10)  L


 


Hemoglobin


  10.6 G/DL


(14.2-18.0)  L


 


Hematocrit


  31.5 %


(42.0-52.0)  L


 


Mean Corpuscular Volume 97 FL (80-99)  


 


Mean Corpuscular Hemoglobin


  32.9 PG


(27.0-31.0)  H


 


Mean Corpuscular Hemoglobin


Concent 33.8 G/DL


(32.0-36.0)


 


Red Cell Distribution Width


  10.9 %


(11.6-14.8)  L


 


Platelet Count


  304 K/UL


(150-450)


 


Mean Platelet Volume


  5.5 FL


(6.5-10.1)  L


 


Neutrophils (%) (Auto)


  61.2 %


(45.0-75.0)


 


Lymphocytes (%) (Auto)


  30.0 %


(20.0-45.0)


 


Monocytes (%) (Auto)


  5.9 %


(1.0-10.0)


 


Eosinophils (%) (Auto)


  2.3 %


(0.0-3.0)


 


Basophils (%) (Auto)


  0.6 %


(0.0-2.0)


 


Sodium Level


  138 MMOL/L


(136-145)


 


Potassium Level


  4.6 MMOL/L


(3.5-5.1)


 


Chloride Level


  99 MMOL/L


()


 


Carbon Dioxide Level


  35 MMOL/L


(21-32)  H


 


Anion Gap


  4 mmol/L


(5-15)  L


 


Blood Urea Nitrogen


  11 mg/dL


(7-18)


 


Creatinine


  0.6 MG/DL


(0.55-1.30)


 


Estimat Glomerular Filtration


Rate  mL/min (>60)  


 


 


Glucose Level


  72 MG/DL


()  L


 


Calcium Level


  8.7 MG/DL


(8.5-10.1)


 


Iron Level


  28 ug/dL


()  L


 


Total Iron Binding Capacity


  167 ug/dL


(250-450)  L


 


Percent Iron Saturation 17 % (15-50)  


 


Unsaturated Iron Binding


  139 ug/dL


(112-346)


 


Total Bilirubin


  0.3 MG/DL


(0.2-1.0)


 


Aspartate Amino Transf


(AST/SGOT) 21 U/L (15-37)


 


 


Alanine Aminotransferase


(ALT/SGPT) 40 U/L (12-78)


 


 


Alkaline Phosphatase


  125 U/L


()  H


 


Total Protein


  5.5 G/DL


(6.4-8.2)  L


 


Albumin


  1.7 G/DL


(3.4-5.0)  L


 


Globulin 3.8 g/dL  


 


Albumin/Globulin Ratio


  0.4 (1.0-2.7)


L


 


Lipase


  263 U/L


()


 


Carcinoembryonic Antigen Pending  


 


Vitamin B12 Level


  1543 PG/ML


(193-986)  H


 


Folate


  7.9 NG/ML


(8.6-58.9)  L








Height (Feet):  5


Height (Inches):  2.00


Weight (Pounds):  112


General Appearance:  WD/WN, no apparent distress, alert


Cardiovascular:  normal rate


Respiratory/Chest:  normal breath sounds, no respiratory distress


Abdominal Exam:  normal bowel sounds, non tender, soft


Extremities:  normal range of motion, non-tender











Louise Rodriguez NP Oct 17, 2019 13:01

## 2019-10-18 VITALS — DIASTOLIC BLOOD PRESSURE: 54 MMHG | SYSTOLIC BLOOD PRESSURE: 113 MMHG

## 2019-10-18 VITALS — SYSTOLIC BLOOD PRESSURE: 126 MMHG | DIASTOLIC BLOOD PRESSURE: 50 MMHG

## 2019-10-18 VITALS — DIASTOLIC BLOOD PRESSURE: 52 MMHG | SYSTOLIC BLOOD PRESSURE: 115 MMHG

## 2019-10-18 VITALS — SYSTOLIC BLOOD PRESSURE: 111 MMHG | DIASTOLIC BLOOD PRESSURE: 49 MMHG

## 2019-10-18 VITALS — SYSTOLIC BLOOD PRESSURE: 131 MMHG | DIASTOLIC BLOOD PRESSURE: 53 MMHG

## 2019-10-18 LAB
ADD MANUAL DIFF: NO
ALBUMIN SERPL-MCNC: 1.8 G/DL (ref 3.4–5)
ALBUMIN/GLOB SERPL: 0.5 {RATIO} (ref 1–2.7)
ALP SERPL-CCNC: 130 U/L (ref 46–116)
ALT SERPL-CCNC: 39 U/L (ref 12–78)
ANION GAP SERPL CALC-SCNC: 1 MMOL/L (ref 5–15)
AST SERPL-CCNC: 23 U/L (ref 15–37)
BASOPHILS NFR BLD AUTO: 0.6 % (ref 0–2)
BILIRUB SERPL-MCNC: 0.3 MG/DL (ref 0.2–1)
BUN SERPL-MCNC: 13 MG/DL (ref 7–18)
CALCIUM SERPL-MCNC: 9 MG/DL (ref 8.5–10.1)
CHLORIDE SERPL-SCNC: 101 MMOL/L (ref 98–107)
CO2 SERPL-SCNC: 38 MMOL/L (ref 21–32)
CREAT SERPL-MCNC: 0.6 MG/DL (ref 0.55–1.3)
EOSINOPHIL NFR BLD AUTO: 1.8 % (ref 0–3)
ERYTHROCYTE [DISTWIDTH] IN BLOOD BY AUTOMATED COUNT: 11.8 % (ref 11.6–14.8)
GLOBULIN SER-MCNC: 3.9 G/DL
HCT VFR BLD CALC: 32.3 % (ref 42–52)
HGB BLD-MCNC: 10.6 G/DL (ref 14.2–18)
LYMPHOCYTES NFR BLD AUTO: 31.1 % (ref 20–45)
MCV RBC AUTO: 98 FL (ref 80–99)
MONOCYTES NFR BLD AUTO: 6.6 % (ref 1–10)
NEUTROPHILS NFR BLD AUTO: 59.9 % (ref 45–75)
PLATELET # BLD: 353 K/UL (ref 150–450)
POTASSIUM SERPL-SCNC: 4.7 MMOL/L (ref 3.5–5.1)
RBC # BLD AUTO: 3.28 M/UL (ref 4.7–6.1)
SODIUM SERPL-SCNC: 140 MMOL/L (ref 136–145)
WBC # BLD AUTO: 6 K/UL (ref 4.8–10.8)

## 2019-10-18 RX ADMIN — RIVAROXABAN SCH MG: 15 TABLET, FILM COATED ORAL at 10:04

## 2019-10-18 RX ADMIN — LISINOPRIL SCH MG: 10 TABLET ORAL at 10:05

## 2019-10-18 RX ADMIN — CARVEDILOL SCH MG: 6.25 TABLET, FILM COATED ORAL at 10:04

## 2019-10-18 RX ADMIN — DIGOXIN SCH MG: 0.12 TABLET ORAL at 10:05

## 2019-10-18 RX ADMIN — LISINOPRIL SCH MG: 10 TABLET ORAL at 18:05

## 2019-10-18 RX ADMIN — SODIUM CHLORIDE SCH MLS/HR: 0.9 INJECTION INTRAVENOUS at 09:57

## 2019-10-18 NOTE — NUR
NURSE NOTES:

notified [facility] Tamayo/ SN[facility] that pt will be DC to their facility today.  Agata RN took report. 
 



Notified wife pt is going back to SNF today.

## 2019-10-18 NOTE — PULMONOLOGY PROGRESS NOTE
Assessment/Plan


Problems:  


(1) Pneumonia


(2) COPD (chronic obstructive pulmonary disease)


(3) EF 25%


(4) Atrial fibrillation


(5) ICD (implantable cardioverter-defibrillator) in place


(6) CAD (coronary artery disease)


(7) BPH (benign prostatic hyperplasia)


(8) HTN (hypertension)


(9) Chronic anticoagulation


Assessment/Plan


doing better


continue abx


CT chest reviewed, extensive consolidation


all  culture results are negative


chest pt


legionella negative


cultures so far are all negative.


heart rate controlled


cxr from today reviewed, dramatic improvement in Left infiltrate.











Subjective


ROS Limited/Unobtainable:  No


Constitutional:  Reports: no symptoms


HEENT:  Repors: no symptoms


Respiratory:  Reports: no symptoms


Allergies:  


Coded Allergies:  


     No Known Allergies (Unverified , 10/9/19)





Objective





Last 24 Hour Vital Signs








  Date Time  Temp Pulse Resp B/P (MAP) Pulse Ox O2 Delivery O2 Flow Rate FiO2


 


10/18/19 10:05    126/50    


 


10/18/19 10:05  60      


 


10/18/19 10:04  60  126/50    


 


10/18/19 08:00 98.2 60 18 126/50 (75) 95   


 


10/18/19 04:00  61      


 


10/18/19 04:00 97.9 64 18 115/52 (73) 98   


 


10/18/19 00:00  60      


 


10/18/19 00:00 97.8 62 18 111/49 (69) 97   


 


10/17/19 21:00  61  101/44    


 


10/17/19 21:00      Nasal Cannula 2.0 


 


10/17/19 20:00 97.9 61 18 101/44 (63) 97   


 


10/17/19 20:00  60      


 


10/17/19 18:13    114/59    


 


10/17/19 16:00  69      


 


10/17/19 16:00 98.3 62 18 114/59 (77) 97   


 


10/17/19 12:00 97.6 62 18 121/51 (74) 95   


 


10/17/19 12:00  60      

















Intake and Output  


 


 10/17/19 10/18/19





 18:59 06:59


 


Intake Total 860 ml 


 


Balance 860 ml 


 


  


 


Intake Oral 860 ml 


 


# Voids 3 4


 


# Bowel Movements 1 5








General Appearance:  WD/WN


HEENT:  normocephalic, anicteric


Respiratory/Chest:  chest wall non-tender, lungs clear


Cardiovascular:  normal peripheral pulses, normal rate


Abdomen:  normal bowel sounds, soft, non tender


Genitourinary:  normal external genitalia


Extremities:  no clubbing


Neurologic/Psychiatric:  CNs II-XII grossly normal





Microbiology








 Date/Time


Source Procedure


Growth Status


 


 


 10/17/19 21:40


Stool Stool Culture


Pending Resulted


 


 10/17/19 21:40


Stool Clostridium difficile Toxin Assay - Final Resulted








Laboratory Tests


10/17/19 21:40: Stool Occult Blood Negative


10/18/19 05:36: 


White Blood Count 6.0, Red Blood Count 3.28L, Hemoglobin 10.6L, Hematocrit 32.3L

, Mean Corpuscular Volume 98, Mean Corpuscular Hemoglobin 32.4H, Mean 

Corpuscular Hemoglobin Concent 32.9, Red Cell Distribution Width 11.8, Platelet 

Count 353, Mean Platelet Volume 5.3L, Neutrophils (%) (Auto) 59.9, Lymphocytes (

%) (Auto) 31.1, Monocytes (%) (Auto) 6.6, Eosinophils (%) (Auto) 1.8, Basophils 

(%) (Auto) 0.6, Sodium Level 140, Potassium Level 4.7, Chloride Level 101, 

Carbon Dioxide Level 38H, Anion Gap 1L, Blood Urea Nitrogen 13, Creatinine 0.6, 

Estimat Glomerular Filtration Rate , Glucose Level 74, Calcium Level 9.0, Total 

Bilirubin 0.3, Aspartate Amino Transf (AST/SGOT) 23, Alanine Aminotransferase (

ALT/SGPT) 39, Alkaline Phosphatase 130H, Pro-B-Type Natriuretic Peptide 2979H, 

Total Protein 5.7L, Albumin 1.8L, Globulin 3.9, Albumin/Globulin Ratio 0.5L





Current Medications








 Medications


  (Trade)  Dose


 Ordered  Sig/Colleen


 Route


 PRN Reason  Start Time


 Stop Time Status Last Admin


Dose Admin


 


 Acetaminophen


  (Tylenol)  650 mg  Q4H  PRN


 ORAL


 FEVER  10/9/19 12:45


 11/8/19 12:44   


 


 


 Atorvastatin


 Calcium


  (Lipitor)  40 mg  BEDTIME


 ORAL


   10/10/19 21:00


 11/9/19 20:59  10/17/19 21:46


 


 


 Carvedilol


  (Coreg)  6.25 mg  EVERY 12  HOURS


 ORAL


   10/10/19 21:00


 11/9/19 20:59  10/18/19 10:04


 


 


 Cefepime HCl 2 gm/


 Dextrose  110 ml @ 


 220 mls/hr  DAILY


 IV


   10/10/19 09:00


 10/18/19 23:59  10/18/19 09:57


 


 


 Dextrose


  (Dextrose 50%)  25 ml  Q30M  PRN


 IV


 Hypoglycemia  10/9/19 12:45


 11/8/19 12:44   


 


 


 Dextrose


  (Dextrose 50%)  50 ml  Q30M  PRN


 IV


 Hypoglycemia  10/9/19 12:45


 11/8/19 12:44   


 


 


 Digoxin


  (Lanoxin)  0.125 mg  DAILY


 ORAL


   10/11/19 09:00


 11/10/19 08:59  10/18/19 10:05


 


 


 Donepezil HCl


  (Aricept)  5 mg  QHS


 ORAL


   10/12/19 21:00


 11/11/19 20:59  10/17/19 21:45


 


 


 Escitalopram


 Oxalate


  (Lexapro)  5 mg  DAILY


 ORAL


   10/13/19 09:00


 11/12/19 08:59  10/18/19 10:04


 


 


 Folic Acid


  (Folate)  1 mg  DAILY


 ORAL


   10/18/19 09:00


 11/17/19 08:59   


 


 


 Lisinopril


  (Zestril)  10 mg  BID


 ORAL


   10/17/19 09:00


 11/16/19 08:59  10/18/19 10:05


 


 


 Loperamide HCl


  (Imodium)  2 mg  QID  PRN


 ORAL


 Diarrhea  10/16/19 09:15


 11/15/19 09:14  10/18/19 10:05


 


 


 Promethazine HCl/


 Codeine


  (Phenergan with


 Codeine)  5 ml  Q4H  PRN


 ORAL


 For Cough  10/9/19 12:45


 11/8/19 12:44   


 


 


 Rivaroxaban


  (Xarelto)  15 mg  DAILY


 ORAL


   10/10/19 09:00


 11/9/19 08:59  10/18/19 10:04


 


 


 Tamsulosin HCl


  (Flomax)  0.4 mg  QHS


 ORAL


   10/9/19 21:00


 11/8/19 20:59  10/17/19 21:45


 


 


 Trazodone HCl


  (Desyrel)  25 mg  BEDTIME


 ORAL


   10/9/19 21:00


 11/8/19 20:59  10/17/19 21:45


 

















Lucy Carroll MD Oct 18, 2019 11:32

## 2019-10-18 NOTE — GENERAL PROGRESS NOTE
Assessment/Plan


Status:  stable


Assessment/Plan:


(1) HTN (hypertension)


ICD Codes:  I10 - Essential (primary) hypertension


SNOMED:  09566208


(2) CAD (coronary artery disease)


ICD Codes:  I25.10 - Atherosclerotic heart disease of native coronary artery 

without angina pectoris


SNOMED:  88138029


(3) Anemia


ICD Codes:  D64.9 - Anemia, unspecified


SNOMED:  421188112





Assessment/Plan


folate deficiency


diarrhea


electrolyte imbalance


anemia


patient on Eliquis





anemia work up reviewed, will give folate


outpatient GI procedures which Eliquis must be discontinued for min 48 hours 

prior any procedures


monitor H&H, prn transfusions


ppi


cdiff neg , Imodium prn 


electrolyte correction


follow labs


fu stool ob





Subjective


ROS Limited/Unobtainable:  No


Allergies:  


Coded Allergies:  


     No Known Allergies (Unverified , 10/9/19)





Objective





Last 24 Hour Vital Signs








  Date Time  Temp Pulse Resp B/P (MAP) Pulse Ox O2 Delivery O2 Flow Rate FiO2


 


10/18/19 04:00  61      


 


10/18/19 04:00 97.9 64 18 115/52 (73) 98   


 


10/18/19 00:00  60      


 


10/18/19 00:00 97.8 62 18 111/49 (69) 97   


 


10/17/19 21:00  61  101/44    


 


10/17/19 21:00      Nasal Cannula 2.0 


 


10/17/19 20:00 97.9 61 18 101/44 (63) 97   


 


10/17/19 20:00  60      


 


10/17/19 18:13    114/59    


 


10/17/19 16:00  69      


 


10/17/19 16:00 98.3 62 18 114/59 (77) 97   


 


10/17/19 12:00 97.6 62 18 121/51 (74) 95   


 


10/17/19 12:00  60      


 


10/17/19 09:35  63 18  99 Nasal Cannula 2.0 28


 


10/17/19 09:35     99 Nasal Cannula 2.0 28

















Intake and Output  


 


 10/17/19 10/18/19





 19:00 07:00


 


Intake Total 860 ml 


 


Balance 860 ml 


 


  


 


Intake Oral 860 ml 


 


# Voids 3 4


 


# Bowel Movements 1 5








Laboratory Tests


10/17/19 21:40: Stool Occult Blood [Pending]


10/18/19 05:36: 


White Blood Count 6.0, Red Blood Count 3.28L, Hemoglobin 10.6L, Hematocrit 32.3L

, Mean Corpuscular Volume 98, Mean Corpuscular Hemoglobin 32.4H, Mean 

Corpuscular Hemoglobin Concent 32.9, Red Cell Distribution Width 11.8, Platelet 

Count 353, Mean Platelet Volume 5.3L, Neutrophils (%) (Auto) 59.9, Lymphocytes (

%) (Auto) 31.1, Monocytes (%) (Auto) 6.6, Eosinophils (%) (Auto) 1.8, Basophils 

(%) (Auto) 0.6, Sodium Level 140, Potassium Level 4.7, Chloride Level 101, 

Carbon Dioxide Level 38H, Anion Gap 1L, Blood Urea Nitrogen 13, Creatinine 0.6, 

Estimat Glomerular Filtration Rate , Glucose Level 74, Calcium Level 9.0, Total 

Bilirubin 0.3, Aspartate Amino Transf (AST/SGOT) 23, Alanine Aminotransferase (

ALT/SGPT) 39, Alkaline Phosphatase 130H, Pro-B-Type Natriuretic Peptide 2979H, 

Total Protein 5.7L, Albumin 1.8L, Globulin 3.9, Albumin/Globulin Ratio 0.5L


Height (Feet):  5


Height (Inches):  2.00


Weight (Pounds):  112


General Appearance:  alert


EENT:  normal ENT inspection


Neck:  supple


Cardiovascular:  normal rate


Respiratory/Chest:  decreased breath sounds


Abdomen:  normal bowel sounds, non tender, soft


Extremities:  non-tender











Scooby Conklin MD Oct 18, 2019 09:23

## 2019-10-18 NOTE — NUR
NURSE NOTES:

pt in bed, food at bedside.  AOx4 very talkative. Pt is on cardiac monitor, no signs of 
cardiac or respiratory distress at this time.   Pt is been having loose bowls.  Bed is 
locked and in lowest position.   Call light within reach.  Will continue to monitor pt.

## 2019-10-18 NOTE — NUR
NURSE NOTES:

pt. left via gurney in stable condition.  AOx4.  Cardiac monitor taken off as well as IV.  
Iv site is not bleeding.  Pt belonging bag left with pt. on gurney.  pt unable to signs 
belonging sheet.  All necessary paperwork was printed and put on a folder and given to 
ambulance personal for pt can take home.

## 2019-10-18 NOTE — NUR
PT EVALUATION NOTE

Patient seen for initial evaluation, see complete evaluation for details. Patient presents 
with generalized weakness and limited endurance which affects patient's ability to perform 
mobility tasks safely. Patient requires min/mod assist for bed mobility and transfer tasks. 
Patient only able to ambulate 6 ft with min assist and FWW. Patient will benefit from 
skilled inpatient PT intervention to address strength, balance and safety for increased 
independence and endurance with functional mobility. Recommend ARU/SNF for further rehab 
once medically cleared by MD. Patient has four wheeled walker and wheelchair. 

-------------------------------------------------------------------------------

Addendum: 10/18/19 at 1214 by BECCA WITT PT

-------------------------------------------------------------------------------

Amended: Links added.

## 2019-10-18 NOTE — PROGRESS NOTE
DATE:  10/18/2019

SUBJECTIVE:  The patient is an 83-year-old male with shortness of breath,

weakness, and hypertension.  This patient still has some altered mental

status, mood lability, depression, but also _____ decline in cognition

below his baseline.  That is why, his attending has requested daily

psychiatric consultation.



MENTAL STATUS EXAMINATION:  This is an 83-year-old male.  Appearance is

disheveled.  Attitude, irritable and agitated.  Affect, guarded and

restricted.  Intellect poor.  Mood, depressed and anxious.  Motor

activity, psychomotor agitation.  Attention span is poor.  Orientation x2.

Speech is low volume, slurred.  Thought process, disorganized and

illogical.  Insight and judgment is poor.



DIAGNOSIS:  Major depressive disorder, mild, recurrent with psychotic

features, rule out dementia with psychosis.



PLAN:  Treat him with Aricept 5 mg at bedtime, Lexapro 5 mg daily, and

Aricept 5 mg at bedtime.  A 20 minutes of cognitive behavioral therapy to

help him identify his automatic negative thoughts and help him convert his

negative thoughts to more positive thoughts to reduce depression, anxiety,

and mood lability.  Chart reviewed.  Discussed with staff.  Seen and

assessed in his room.









  ______________________________________________

  Vero Andrea M.D.





DR:  NAKUL

D:  10/18/2019 13:08

T:  10/18/2019 15:42

JOB#:  [id number]

CC:

## 2019-10-18 NOTE — DIAGNOSTIC IMAGING REPORT
Indication: Dyspnea

 

Technique: XRAY Chest 1v

 

Comparison: 10/14/2019

 

Findings: Heart size is stable. Again there is evidence of prior cardiac surgery with

median sternotomy and likely CABG. Indwelling left-sided pacer/ICD unchanged in

position. Pulmonary vascular congestion/mild interstitial edema is noted. Focal

opacities in the left upper lung persist but are decreased compared to prior. Small

left pleural effusion also appears decreased. No appreciable pneumothorax. Osseous

structures stable.

 

IMPRESSION: 

Persistent but decreased left upper lobe infiltrate.

 

Cardiomegaly and findings suggesting pulmonary vascular congestion/mild interstitial

edema. Correlate clinically.

 

Small left pleural effusion, decreased compared to prior.

## 2019-10-18 NOTE — INFECTIOUS DISEASES PROG NOTE
Assessment/Plan


Assessment/Plan





Assessment:


SEvere sepsis 2ry to PNA\


  -10/18 cXR: Persistent but decreased left upper lobe infiltrate. Cardiomegaly 

and findings suggesting pulmonary vascular congestion/mild interstitial edema. 

Correlate clinically. 


   -10/14 CXR: Suspect slight worsening of left upper lobe infiltrate and some 

associated volume loss. The latter may in part be artifactual due to patient 

rotation, however.


    -CT chest:  Consolidation within the left upper lobe.  Bilateral patchy 

groundglass and interstitial infiltrates.  Findings could be related to 

infectious inflammatory infiltrate.  Posttreatment imaging recommended to 

ensure resolution and exclude underlying neoplastic process malignancy.  

Bilateral pleural effusions as well as ascites and subcutaneous 


edema anasarca..  Cardiomegaly.





Transient hypotension


    -CXR:  Left upper lobe consolidation most likely due to pneumonia


    -influenza sc, legionella ag urine neg


    -sp cx C.albicans (colonizer)





 Afebrile


Leukocytosis; SP


   -u/a wbc 15-20, nit neg, leuk +1; ucx Neg





Lactic acidosis, mild- resolved





Afib on AC


HTN


HLD


CHF


anemia


BPH 


schizophrenia


CAD/MI s/p CABG


 s/p AICD


 tobacco abuse


 cardiomyopathy EF 25-30%


[facility]


 


Plan:


-Continue empiric Cefepime #10/10 for PNA


    -10/16 SP IV Vancomycin #8


    -10/14 SP azithromycin #6


   -10/9 SP ZOsyn x1





-f/u cx


-Monitor CBC/CMP, temperatures


-aspiration precautions





Thank you for this consultation. Will continue to follow along with you.





Discussed with RN





Subjective


Allergies:  


Coded Allergies:  


     No Known Allergies (Unverified , 10/9/19)


Subjective


afebrile


no leukocytosis


discharge planning





Objective


Vital Signs





Last 24 Hour Vital Signs








  Date Time  Temp Pulse Resp B/P (MAP) Pulse Ox O2 Delivery O2 Flow Rate FiO2


 


10/18/19 10:05    126/50    


 


10/18/19 10:05  60      


 


10/18/19 10:04  60  126/50    


 


10/18/19 08:00 98.2 60 18 126/50 (75) 95   


 


10/18/19 04:00  61      


 


10/18/19 04:00 97.9 64 18 115/52 (73) 98   


 


10/18/19 00:00  60      


 


10/18/19 00:00 97.8 62 18 111/49 (69) 97   


 


10/17/19 21:00  61  101/44    


 


10/17/19 21:00      Nasal Cannula 2.0 


 


10/17/19 20:00 97.9 61 18 101/44 (63) 97   


 


10/17/19 20:00  60      


 


10/17/19 18:13    114/59    


 


10/17/19 16:00  69      


 


10/17/19 16:00 98.3 62 18 114/59 (77) 97   








Height (Feet):  5


Height (Inches):  2.00


Weight (Pounds):  112


Objective


GENERAL:  A thin elderly appearing  male, in no acute


distress.


HEENT:  Normocephalic and atraumatic.  Bitemporal wasting.  Pupils are


equal, round, and reactive to light.  Sclerae anicteric.  Oral mucosa are


moist.


NECK:  Supple.  There is no jugular venous distention.


LUNGS:  Left rales.


HEART:  Regular S1, S2.  No murmur or S3.


ABDOMEN:  Soft and nontender.  No palpable mass.


EXTREMITIES:  No cyanosis, clubbing, or edema.





Microbiology








 Date/Time


Source Procedure


Growth Status


 


 


 10/17/19 21:40


Stool Stool Culture


Pending Resulted


 


 10/17/19 21:40


Stool Clostridium difficile Toxin Assay - Final Resulted











Laboratory Tests








Test


  10/17/19


21:40 10/18/19


05:36


 


Stool Occult Blood


  Negative


(NEGATIVE) 


 


 


White Blood Count


  


  6.0 K/UL


(4.8-10.8)


 


Red Blood Count


  


  3.28 M/UL


(4.70-6.10)  L


 


Hemoglobin


  


  10.6 G/DL


(14.2-18.0)  L


 


Hematocrit


  


  32.3 %


(42.0-52.0)  L


 


Mean Corpuscular Volume  98 FL (80-99)  


 


Mean Corpuscular Hemoglobin


  


  32.4 PG


(27.0-31.0)  H


 


Mean Corpuscular Hemoglobin


Concent 


  32.9 G/DL


(32.0-36.0)


 


Red Cell Distribution Width


  


  11.8 %


(11.6-14.8)


 


Platelet Count


  


  353 K/UL


(150-450)


 


Mean Platelet Volume


  


  5.3 FL


(6.5-10.1)  L


 


Neutrophils (%) (Auto)


  


  59.9 %


(45.0-75.0)


 


Lymphocytes (%) (Auto)


  


  31.1 %


(20.0-45.0)


 


Monocytes (%) (Auto)


  


  6.6 %


(1.0-10.0)


 


Eosinophils (%) (Auto)


  


  1.8 %


(0.0-3.0)


 


Basophils (%) (Auto)


  


  0.6 %


(0.0-2.0)


 


Sodium Level


  


  140 MMOL/L


(136-145)


 


Potassium Level


  


  4.7 MMOL/L


(3.5-5.1)


 


Chloride Level


  


  101 MMOL/L


()


 


Carbon Dioxide Level


  


  38 MMOL/L


(21-32)  H


 


Anion Gap


  


  1 mmol/L


(5-15)  L


 


Blood Urea Nitrogen


  


  13 mg/dL


(7-18)


 


Creatinine


  


  0.6 MG/DL


(0.55-1.30)


 


Estimat Glomerular Filtration


Rate 


   mL/min (>60)  


 


 


Glucose Level


  


  74 MG/DL


()


 


Calcium Level


  


  9.0 MG/DL


(8.5-10.1)


 


Total Bilirubin


  


  0.3 MG/DL


(0.2-1.0)


 


Aspartate Amino Transf


(AST/SGOT) 


  23 U/L (15-37)


 


 


Alanine Aminotransferase


(ALT/SGPT) 


  39 U/L (12-78)


 


 


Alkaline Phosphatase


  


  130 U/L


()  H


 


Pro-B-Type Natriuretic Peptide


  


  2979 pg/mL


(0-125)  H


 


Total Protein


  


  5.7 G/DL


(6.4-8.2)  L


 


Albumin


  


  1.8 G/DL


(3.4-5.0)  L


 


Globulin  3.9 g/dL  


 


Albumin/Globulin Ratio


  


  0.5 (1.0-2.7)


L











Current Medications








 Medications


  (Trade)  Dose


 Ordered  Sig/Colleen


 Route


 PRN Reason  Start Time


 Stop Time Status Last Admin


Dose Admin


 


 Acetaminophen


  (Tylenol)  650 mg  Q4H  PRN


 ORAL


 FEVER  10/9/19 12:45


 11/8/19 12:44   


 


 


 Atorvastatin


 Calcium


  (Lipitor)  40 mg  BEDTIME


 ORAL


   10/10/19 21:00


 11/9/19 20:59  10/17/19 21:46


 


 


 Carvedilol


  (Coreg)  6.25 mg  EVERY 12  HOURS


 ORAL


   10/10/19 21:00


 11/9/19 20:59  10/18/19 10:04


 


 


 Cefepime HCl 2 gm/


 Dextrose  110 ml @ 


 220 mls/hr  DAILY


 IV


   10/10/19 09:00


 10/18/19 23:59  10/18/19 09:57


 


 


 Dextrose


  (Dextrose 50%)  25 ml  Q30M  PRN


 IV


 Hypoglycemia  10/9/19 12:45


 11/8/19 12:44   


 


 


 Dextrose


  (Dextrose 50%)  50 ml  Q30M  PRN


 IV


 Hypoglycemia  10/9/19 12:45


 11/8/19 12:44   


 


 


 Digoxin


  (Lanoxin)  0.125 mg  DAILY


 ORAL


   10/11/19 09:00


 11/10/19 08:59  10/18/19 10:05


 


 


 Donepezil HCl


  (Aricept)  5 mg  QHS


 ORAL


   10/12/19 21:00


 11/11/19 20:59  10/17/19 21:45


 


 


 Escitalopram


 Oxalate


  (Lexapro)  5 mg  DAILY


 ORAL


   10/13/19 09:00


 11/12/19 08:59  10/18/19 10:04


 


 


 Folic Acid


  (Folate)  1 mg  DAILY


 ORAL


   10/18/19 09:00


 11/17/19 08:59  10/18/19 12:10


 


 


 Lisinopril


  (Zestril)  10 mg  BID


 ORAL


   10/17/19 09:00


 11/16/19 08:59  10/18/19 10:05


 


 


 Loperamide HCl


  (Imodium)  2 mg  QID  PRN


 ORAL


 Diarrhea  10/16/19 09:15


 11/15/19 09:14  10/18/19 10:05


 


 


 Promethazine HCl/


 Codeine


  (Phenergan with


 Codeine)  5 ml  Q4H  PRN


 ORAL


 For Cough  10/9/19 12:45


 11/8/19 12:44   


 


 


 Rivaroxaban


  (Xarelto)  15 mg  DAILY


 ORAL


   10/10/19 09:00


 11/9/19 08:59  10/18/19 10:04


 


 


 Tamsulosin HCl


  (Flomax)  0.4 mg  QHS


 ORAL


   10/9/19 21:00


 11/8/19 20:59  10/17/19 21:45


 


 


 Trazodone HCl


  (Desyrel)  25 mg  BEDTIME


 ORAL


   10/9/19 21:00


 11/8/19 20:59  10/17/19 21:45


 

















Opal Alamo M.D. Oct 18, 2019 15:48

## 2019-10-18 NOTE — NUR
*-* DISCHARGE PLANNING *-*



PATIENT HAS BEEN REFERRED TO:





[facility]

P: [phone]

F: [phone]

## 2019-10-18 NOTE — GENERAL PROGRESS NOTE
Assessment/Plan


Problem List:  


(1) UTI (urinary tract infection)


ICD Codes:  N39.0 - Urinary tract infection, site not specified


SNOMED:  74376631


Qualifiers:  


   Qualified Codes:  N39.0 - Urinary tract infection, site not specified


(2) Sepsis


ICD Codes:  A41.9 - Sepsis, unspecified organism


SNOMED:  68739270


Qualifiers:  


   Qualified Codes:  A41.9 - Sepsis, unspecified organism


(3) Pneumonia


ICD Codes:  J18.9 - Pneumonia, unspecified organism


SNOMED:  902029507


Qualifiers:  


   Qualified Codes:  J18.1 - Lobar pneumonia, unspecified organism


(4) Hypotension


ICD Codes:  I95.9 - Hypotension, unspecified


SNOMED:  42617314


(5) CHF (congestive heart failure)


ICD Codes:  I50.9 - Heart failure, unspecified


SNOMED:  14411733


(6) HTN (hypertension)


ICD Codes:  I10 - Essential (primary) hypertension


SNOMED:  83143772


Status:  stable, progressing


Assessment/Plan:


o2 pulm tx abx pt diet cbc bmp am aru eval





Subjective


Constitutional:  Reports: weakness


Allergies:  


Coded Allergies:  


     No Known Allergies (Unverified , 10/9/19)


All Systems:  reviewed and negative except above


Subjective


o2nc calm in bed





Objective





Last 24 Hour Vital Signs








  Date Time  Temp Pulse Resp B/P (MAP) Pulse Ox O2 Delivery O2 Flow Rate FiO2


 


10/18/19 04:00  61      


 


10/18/19 04:00 97.9 64 18 115/52 (73) 98   


 


10/18/19 00:00  60      


 


10/18/19 00:00 97.8 62 18 111/49 (69) 97   


 


10/17/19 21:00  61  101/44    


 


10/17/19 21:00      Nasal Cannula 2.0 


 


10/17/19 20:00 97.9 61 18 101/44 (63) 97   


 


10/17/19 20:00  60      


 


10/17/19 18:13    114/59    


 


10/17/19 16:00  69      


 


10/17/19 16:00 98.3 62 18 114/59 (77) 97   


 


10/17/19 12:00 97.6 62 18 121/51 (74) 95   


 


10/17/19 12:00  60      


 


10/17/19 09:35  63 18  99 Nasal Cannula 2.0 28


 


10/17/19 09:35     99 Nasal Cannula 2.0 28

















Intake and Output  


 


 10/17/19 10/18/19





 19:00 07:00


 


Intake Total 860 ml 


 


Balance 860 ml 


 


  


 


Intake Oral 860 ml 


 


# Voids 3 4


 


# Bowel Movements 1 5








Laboratory Tests


10/17/19 21:40: Stool Occult Blood [Pending]


10/18/19 05:36: 


White Blood Count 6.0, Red Blood Count 3.28L, Hemoglobin 10.6L, Hematocrit 32.3L

, Mean Corpuscular Volume 98, Mean Corpuscular Hemoglobin 32.4H, Mean 

Corpuscular Hemoglobin Concent 32.9, Red Cell Distribution Width 11.8, Platelet 

Count 353, Mean Platelet Volume 5.3L, Neutrophils (%) (Auto) 59.9, Lymphocytes (

%) (Auto) 31.1, Monocytes (%) (Auto) 6.6, Eosinophils (%) (Auto) 1.8, Basophils 

(%) (Auto) 0.6, Sodium Level 140, Potassium Level 4.7, Chloride Level 101, 

Carbon Dioxide Level 38H, Anion Gap 1L, Blood Urea Nitrogen 13, Creatinine 0.6, 

Estimat Glomerular Filtration Rate , Glucose Level 74, Calcium Level 9.0, Total 

Bilirubin 0.3, Aspartate Amino Transf (AST/SGOT) 23, Alanine Aminotransferase (

ALT/SGPT) 39, Alkaline Phosphatase 130H, Pro-B-Type Natriuretic Peptide 2979H, 

Total Protein 5.7L, Albumin 1.8L, Globulin 3.9, Albumin/Globulin Ratio 0.5L


Height (Feet):  5


Height (Inches):  2.00


Weight (Pounds):  112


General Appearance:  lethargic


EENT:  normal ENT inspection


Neck:  normal alignment


Cardiovascular:  normal peripheral pulses, normal rate, regular rhythm


Respiratory/Chest:  chest wall non-tender, lungs clear, normal breath sounds


Abdomen:  normal bowel sounds, non tender, soft


Extremities:  normal inspection


Edema:  no edema noted Arm (L), no edema noted Arm (R), no edema noted Leg (L), 

no edema noted Leg (R), no edema noted Pedal (L), no edema noted Pedal (R), no 

edema noted Generalized


Neurologic:  responsive, motor weakness


Skin:  normal pigmentation, warm/dry











Timo Morejon DO Oct 18, 2019 09:27

## 2019-10-20 NOTE — DISCHARGE SUMMARY
Discharge Summary


Discharge Summary


_


DATE OF ADMISSION: 10/09/2019


DATE OF DISCHARGE: 10/18/2019





DISCHARGED BY: Dr. Timo Morejon





CONSULTANTS: 


Dr. Opal Campos [facility] HOSPITAL COURSE:


Patient is an 84-year-old male from [facility], who 

presented to ED due to shortness of breath cough and congestion.  Cough was 

productive with whitish phlegm.  He has medical history significant for 

hypertension, MI, hyperlipidemia, BPH, heart failure, atrial fibrillation and 

psychiatric history.





Upon evaluation at the ED, blood pressure was 74/42, pulse rate 89, he was 

hypothermic with temperature of 95.5.  Blood work showed WBC elevated to 28.  

Hemoglobin 12, hematocrit 36.  Lactic acid elevated to 3.2.  Troponin 0 0.067.  

proBNP 8769.  Urinalysis with 5+ blood, negative nitrite, 1+ leukocyte esterase

, many to count urine RBC and 15-20 urine WBC.  EKG showed paced rhythm with no 

acute ischemic changes.  Chest x-ray showed pacemaker with left upper lung 

infiltrates.  Patient was initially hypotensive.  Blood pressure improved post 

fluid bolus.  He was given broad-spectrum antibiotics.  Lipase 51 so middle 

could worse but it suspicion that he was then admitted for evaluation of sepsis 

and pneumonia.





ID specialist was consulted.  He was given IV vancomycin, cefepime and 

azithromycin.  Influenza screening negative.  Legionella is negative.





Cardiologist was consulted.  Patient has a history of coronary artery bypass 

graft surgery, ischemic cardiomyopathy, status post biventricular ICD with 

several generator replacements.  His current generator is a Medtronic device.  

Echocardiogram showed ejection fraction of 25 to 30%, global left ventricular 

hypokinesis and apical akinesis.  Upon cardiologist evaluation, patient did not 

appear to be in congestive heart failure.  Atrial fibrillation was with 

controlled rate and pacing at 60 bpm.  He was continued on Xarelto for stroke 

prevention.





Pulmonologist was consulted.  He was given nebulizer treatment.  He was ordered 

chest physiotherapy.  Lung examination was significant for rhonchi.





Blood pressure improved.  He was able to get started on low-dose ACE inhibitor.

  He was eventually started on beta-blocker.





Chest CT without contrast showed consolidation within the left upper lobe.  

Bilateral patchy groundglass and interstitial infiltrates.  Findings could be 

related to infectious inflammatory infiltrate.  Bilateral pleural effusion as 

well as ascites and subcutaneous anasarca.  Recommend posttreatment imaging.





Psychiatric evaluation was done.  He was diagnosed with major depressive 

disorder, severe, recurrent with psychotic features, rule out dementia with 

psychosis.  He was provided cognitive behavioral therapy.





Sputum culture with growth of yeast.  IV vancomycin was discontinued.





GI was consulted for evaluation of diarrhea.  MiraLAX was discontinued.  C. 

difficile was negative.  He was given Imodium as needed.  Anemia work-up showed 

folate deficiency.  He was given folic acid.





Venous duplex of lower and upper extremity was negative for acute thrombus.  He 

was given PT.





Repeat chest x-ray showed improvement in left infiltrate.  All cultures were 

negative.





He was eventually discharged back to nursing home.





FINAL DIAGNOSES: 


Severe sepsis secondary to pneumonia


Transient hypotension


Lactic acidosis, resolved


Atrial fibrillation on anticoagulation and status post AICD


BPH


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


Hypertension


Hyperlipidemia


Anemia


BPH


Coronary artery disease status post biventricular ICD Medtronic device


Ischemic cardiomyopathy


Chronic systolic CHF


Diarrhea, C. difficile negative


Major depressive disorder, mild, recurrent with psychotic features





DISPOSITION: DC to SNF.





DISCHARGE MEDICATIONS: Refer to Discharge Medication List.








I have been assigned to complete a discharge summary on this account, I was not 

involved with the patient's management.--MARIALUISA Alejo Jacqueline Robles NP Oct 20, 2019 16:32

## 2020-10-20 NOTE — DIAGNOSTIC IMAGING REPORT
EXAM:

  CT Chest Without Intravenous Contrast

 

CLINICAL HISTORY:

  MASS

 

TECHNIQUE:

  Axial computed tomography images of the chest without intravenous 

contrast.  CTDI is 21.2 mGy and DLP is 1000 mGy-cm.  One or more of the 

following dose reduction techniques were used: automated exposure control,

 adjustment of the mA and or kV according to patient size, use of 

iterative reconstruction technique.

 

COMPARISON:

  No relevant prior studies available.

 

FINDINGS:

  Lungs:  Patchy consolidation within the left upper lobe with air 

bronchograms.  Multi lobar areas of groundglass attenuation as well as 

interstitial thickening, predominantly within the bilateral upper, right 

middle, and left lower lobes..  Emphysema.

  Pleural space:  Bilateral moderate pleural effusions.  No pneumothorax.

  Heart:  Cardiomegaly.  AICD.

  Bones joints:  Sternotomy and likely coronary bypass changes.  

Multilevel spondylosis.  No acute fracture.  No dislocation.

  Soft tissues:  Subcutaneous edema anasarca.

  Vasculature:  Severe atherosclerosis.  Limited characterization of the 

vasculature on unenhanced imaging.

  Lymph nodes: Enlarged subcarinal lymph node up to 1.2 cm short axis 

caliber.  Hilar lymph nodes nondiagnostic on unenhanced imaging.

  Intraperitoneal space:  Ascites.

 

IMPRESSION:     

1.  Consolidation within the left upper lobe.  Bilateral patchy 

groundglass and interstitial infiltrates.  Findings could be related to 

infectious inflammatory infiltrate.  Posttreatment imaging recommended to 

ensure resolution and exclude underlying neoplastic process malignancy.

2.  Bilateral pleural effusions as well as ascites and subcutaneous 

edema anasarca.

3.  Cardiomegaly.

4.  Additional findings above.

 

<MYCVCSECTION>

 

[employer]

 

10 11 19 18:37 Verify Receipt with Nurse Verified receipt with RAHEEL Lackey 

on 10 11 18:37 (-07:00) 21-Oct-2020

## 2023-01-12 NOTE — NUR
NURSE NOTES:  Received patient from [facility].  Patient in bed, alert and oriented x3.  On 
room air, no s/s of respiratory distress.  20 Gauge iv on right ac intact, patent, NS 
infusing at 75ml/hr.  Bed in low position, locked, call light within reach. 15-21 days ([place])